# Patient Record
Sex: MALE | Race: WHITE | NOT HISPANIC OR LATINO | Employment: OTHER | URBAN - METROPOLITAN AREA
[De-identification: names, ages, dates, MRNs, and addresses within clinical notes are randomized per-mention and may not be internally consistent; named-entity substitution may affect disease eponyms.]

---

## 2023-04-28 ENCOUNTER — NURSING HOME VISIT (OUTPATIENT)
Dept: POST ACUTE CARE | Facility: EXTERNAL LOCATION | Age: 63
End: 2023-04-28
Payer: MEDICARE

## 2023-04-28 DIAGNOSIS — G35 MULTIPLE SCLEROSIS (MULTI): Primary | ICD-10-CM

## 2023-04-28 DIAGNOSIS — N31.9 NEUROGENIC BLADDER: ICD-10-CM

## 2023-04-28 PROCEDURE — 99308 SBSQ NF CARE LOW MDM 20: CPT | Performed by: FAMILY MEDICINE

## 2023-04-28 NOTE — LETTER
Patient: Raheem Hilton  : 1960    Encounter Date: 2023    A handwritten note was entered into the medical record at [facility] on this date.      Electronically Signed By: Luca Angeles MD   23 11:48 AM

## 2023-07-19 ENCOUNTER — NURSING HOME VISIT (OUTPATIENT)
Dept: POST ACUTE CARE | Facility: EXTERNAL LOCATION | Age: 63
End: 2023-07-19
Payer: MEDICARE

## 2023-07-19 DIAGNOSIS — G35 MULTIPLE SCLEROSIS (MULTI): Primary | ICD-10-CM

## 2023-07-19 DIAGNOSIS — N31.9 NEUROGENIC BLADDER: ICD-10-CM

## 2023-07-19 PROCEDURE — 99308 SBSQ NF CARE LOW MDM 20: CPT | Performed by: FAMILY MEDICINE

## 2023-07-19 NOTE — LETTER
Patient: Raheem Hilton  : 1960    Encounter Date: 2023    A handwritten note was entered into the medical record at [facility] on this date.      Electronically Signed By: Luca Angeles MD   23 11:49 AM

## 2023-09-26 ENCOUNTER — HOSPITAL ENCOUNTER (INPATIENT)
Dept: DATA CONVERSION | Facility: HOSPITAL | Age: 63
LOS: 6 days | Discharge: NURSING, REHAB OR LONG-TERM CARE | DRG: 698 | End: 2023-10-02
Attending: EMERGENCY MEDICINE | Admitting: INTERNAL MEDICINE
Payer: MEDICARE

## 2023-09-26 DIAGNOSIS — N39.0 URINARY TRACT INFECTION, SITE NOT SPECIFIED: ICD-10-CM

## 2023-09-26 DIAGNOSIS — J18.9 PNEUMONIA, UNSPECIFIED ORGANISM: ICD-10-CM

## 2023-09-26 LAB
ALANINE AMINOTRANSFERASE (SGPT) (U/L) IN SER/PLAS: 17 U/L (ref 10–52)
ALBUMIN (G/DL) IN SER/PLAS: 3.7 G/DL (ref 3.4–5)
ALKALINE PHOSPHATASE (U/L) IN SER/PLAS: 104 U/L (ref 33–136)
ANION GAP IN SER/PLAS: 12 MMOL/L (ref 10–20)
APPEARANCE, URINE: ABNORMAL
ASPARTATE AMINOTRANSFERASE (SGOT) (U/L) IN SER/PLAS: 22 U/L (ref 9–39)
BACTERIA, URINE: ABNORMAL /HPF
BASOPHILS (10*3/UL) IN BLOOD BY AUTOMATED COUNT: 0.07 X10E9/L (ref 0–0.1)
BASOPHILS/100 LEUKOCYTES IN BLOOD BY AUTOMATED COUNT: 0.5 % (ref 0–2)
BILIRUBIN TOTAL (MG/DL) IN SER/PLAS: 0.2 MG/DL (ref 0–1.2)
BILIRUBIN, URINE: NEGATIVE
BLOOD, URINE: ABNORMAL
CALCIUM (MG/DL) IN SER/PLAS: 9.1 MG/DL (ref 8.6–10.3)
CARBON DIOXIDE, TOTAL (MMOL/L) IN SER/PLAS: 25 MMOL/L (ref 21–32)
CHLORIDE (MMOL/L) IN SER/PLAS: 98 MMOL/L (ref 98–107)
COLOR, URINE: YELLOW
CREATININE (MG/DL) IN SER/PLAS: 0.51 MG/DL (ref 0.5–1.3)
EOSINOPHILS (10*3/UL) IN BLOOD BY AUTOMATED COUNT: 0.67 X10E9/L (ref 0–0.7)
EOSINOPHILS/100 LEUKOCYTES IN BLOOD BY AUTOMATED COUNT: 4.5 % (ref 0–6)
ERYTHROCYTE DISTRIBUTION WIDTH (RATIO) BY AUTOMATED COUNT: 14 % (ref 11.5–14.5)
ERYTHROCYTE MEAN CORPUSCULAR HEMOGLOBIN CONCENTRATION (G/DL) BY AUTOMATED: 32.3 G/DL (ref 32–36)
ERYTHROCYTE MEAN CORPUSCULAR VOLUME (FL) BY AUTOMATED COUNT: 90 FL (ref 80–100)
ERYTHROCYTES (10*6/UL) IN BLOOD BY AUTOMATED COUNT: 4.6 X10E12/L (ref 4.5–5.9)
GFR MALE: >90 ML/MIN/1.73M2
GLUCOSE (MG/DL) IN SER/PLAS: 127 MG/DL (ref 74–99)
GLUCOSE, URINE: NEGATIVE MG/DL
HEMATOCRIT (%) IN BLOOD BY AUTOMATED COUNT: 41.5 % (ref 41–52)
HEMOGLOBIN (G/DL) IN BLOOD: 13.4 G/DL (ref 13.5–17.5)
IMMATURE GRANULOCYTES/100 LEUKOCYTES IN BLOOD BY AUTOMATED COUNT: 0.3 % (ref 0–0.9)
KETONES, URINE: NEGATIVE MG/DL
LACTATE (MMOL/L) IN SER/PLAS: 1.1 MMOL/L (ref 0.4–2)
LACTATE (MMOL/L) IN SER/PLAS: 2.4 MMOL/L (ref 0.4–2)
LEUKOCYTE ESTERASE, URINE: ABNORMAL
LEUKOCYTES (10*3/UL) IN BLOOD BY AUTOMATED COUNT: 14.9 X10E9/L (ref 4.4–11.3)
LYMPHOCYTES (10*3/UL) IN BLOOD BY AUTOMATED COUNT: 1.77 X10E9/L (ref 1.2–4.8)
LYMPHOCYTES/100 LEUKOCYTES IN BLOOD BY AUTOMATED COUNT: 11.9 % (ref 13–44)
MAGNESIUM (MG/DL) IN SER/PLAS: 1.82 MG/DL (ref 1.6–2.4)
MONOCYTES (10*3/UL) IN BLOOD BY AUTOMATED COUNT: 1.68 X10E9/L (ref 0.1–1)
MONOCYTES/100 LEUKOCYTES IN BLOOD BY AUTOMATED COUNT: 11.3 % (ref 2–10)
MUCUS, URINE: ABNORMAL /LPF
NEUTROPHILS (10*3/UL) IN BLOOD BY AUTOMATED COUNT: 10.68 X10E9/L (ref 1.2–7.7)
NEUTROPHILS/100 LEUKOCYTES IN BLOOD BY AUTOMATED COUNT: 71.5 % (ref 40–80)
NITRITE, URINE: NEGATIVE
PH, URINE: 7 (ref 5–8)
PLATELETS (10*3/UL) IN BLOOD AUTOMATED COUNT: 464 X10E9/L (ref 150–450)
POTASSIUM (MMOL/L) IN SER/PLAS: 3.6 MMOL/L (ref 3.5–5.3)
PROTEIN TOTAL: 7 G/DL (ref 6.4–8.2)
PROTEIN, URINE: ABNORMAL MG/DL
RBC, URINE: 141 /HPF (ref 0–5)
SODIUM (MMOL/L) IN SER/PLAS: 131 MMOL/L (ref 136–145)
SPECIFIC GRAVITY, URINE: 1.01 (ref 1–1.03)
SQUAMOUS EPITHELIAL CELLS, URINE: <1 /HPF
THYROTROPIN (MIU/L) IN SER/PLAS BY DETECTION LIMIT <= 0.05 MIU/L: 0.01 MIU/L (ref 0.44–3.98)
UREA NITROGEN (MG/DL) IN SER/PLAS: 19 MG/DL (ref 6–23)
UROBILINOGEN, URINE: <2 MG/DL (ref 0–1.9)
WBC, URINE: 8 /HPF (ref 0–5)

## 2023-09-26 PROCEDURE — 96361 HYDRATE IV INFUSION ADD-ON: CPT

## 2023-09-26 PROCEDURE — 84439 ASSAY OF FREE THYROXINE: CPT

## 2023-09-26 PROCEDURE — 99291 CRITICAL CARE FIRST HOUR: CPT

## 2023-09-26 PROCEDURE — 83735 ASSAY OF MAGNESIUM: CPT

## 2023-09-26 PROCEDURE — 83605 ASSAY OF LACTIC ACID: CPT

## 2023-09-26 PROCEDURE — 93005 ELECTROCARDIOGRAM TRACING: CPT

## 2023-09-26 PROCEDURE — 87086 URINE CULTURE/COLONY COUNT: CPT

## 2023-09-26 PROCEDURE — 84550 ASSAY OF BLOOD/URIC ACID: CPT

## 2023-09-26 PROCEDURE — 85025 COMPLETE CBC W/AUTO DIFF WBC: CPT

## 2023-09-26 PROCEDURE — 9990 CHARGE CONVERSION

## 2023-09-26 PROCEDURE — 80053 COMPREHEN METABOLIC PANEL: CPT

## 2023-09-26 PROCEDURE — 84481 FREE ASSAY (FT-3): CPT | Mod: 90

## 2023-09-26 PROCEDURE — 84443 ASSAY THYROID STIM HORMONE: CPT

## 2023-09-26 PROCEDURE — 96365 THER/PROPH/DIAG IV INF INIT: CPT

## 2023-09-26 PROCEDURE — 71045 X-RAY EXAM CHEST 1 VIEW: CPT

## 2023-09-26 PROCEDURE — 81001 URINALYSIS AUTO W/SCOPE: CPT

## 2023-09-27 LAB
THYROXINE (T4) FREE (NG/DL) IN SER/PLAS: 1.35 NG/DL (ref 0.61–1.12)
TRIIODOTHYRONINE (T3) FREE (PG/ML) IN SER/PLAS: 4 PG/ML (ref 2.3–4.2)
URATE (MG/DL) IN SER/PLAS: 3.5 MG/DL (ref 4–7.5)

## 2023-09-27 PROCEDURE — 71045 X-RAY EXAM CHEST 1 VIEW: CPT

## 2023-09-27 PROCEDURE — 87086 URINE CULTURE/COLONY COUNT: CPT

## 2023-09-27 PROCEDURE — 85025 COMPLETE CBC W/AUTO DIFF WBC: CPT

## 2023-09-27 PROCEDURE — 83735 ASSAY OF MAGNESIUM: CPT

## 2023-09-27 PROCEDURE — 83605 ASSAY OF LACTIC ACID: CPT

## 2023-09-27 PROCEDURE — 9990 CHARGE CONVERSION

## 2023-09-27 PROCEDURE — 99291 CRITICAL CARE FIRST HOUR: CPT

## 2023-09-27 PROCEDURE — 81001 URINALYSIS AUTO W/SCOPE: CPT

## 2023-09-27 PROCEDURE — 93005 ELECTROCARDIOGRAM TRACING: CPT

## 2023-09-27 PROCEDURE — 96365 THER/PROPH/DIAG IV INF INIT: CPT

## 2023-09-27 PROCEDURE — 84443 ASSAY THYROID STIM HORMONE: CPT

## 2023-09-27 PROCEDURE — 80053 COMPREHEN METABOLIC PANEL: CPT

## 2023-09-27 PROCEDURE — 96361 HYDRATE IV INFUSION ADD-ON: CPT

## 2023-09-28 LAB
ANION GAP IN SER/PLAS: 9 MMOL/L (ref 10–20)
BASOPHILS (10*3/UL) IN BLOOD BY AUTOMATED COUNT: 0.08 X10E9/L (ref 0–0.1)
BASOPHILS/100 LEUKOCYTES IN BLOOD BY AUTOMATED COUNT: 1 % (ref 0–2)
CALCIUM (MG/DL) IN SER/PLAS: 8.4 MG/DL (ref 8.6–10.3)
CARBON DIOXIDE, TOTAL (MMOL/L) IN SER/PLAS: 24 MMOL/L (ref 21–32)
CHLORIDE (MMOL/L) IN SER/PLAS: 108 MMOL/L (ref 98–107)
CREATININE (MG/DL) IN SER/PLAS: 0.38 MG/DL (ref 0.5–1.3)
EOSINOPHILS (10*3/UL) IN BLOOD BY AUTOMATED COUNT: 0.8 X10E9/L (ref 0–0.7)
EOSINOPHILS/100 LEUKOCYTES IN BLOOD BY AUTOMATED COUNT: 10.4 % (ref 0–6)
ERYTHROCYTE DISTRIBUTION WIDTH (RATIO) BY AUTOMATED COUNT: 14.1 % (ref 11.5–14.5)
ERYTHROCYTE MEAN CORPUSCULAR HEMOGLOBIN CONCENTRATION (G/DL) BY AUTOMATED: 31.8 G/DL (ref 32–36)
ERYTHROCYTE MEAN CORPUSCULAR VOLUME (FL) BY AUTOMATED COUNT: 92 FL (ref 80–100)
ERYTHROCYTES (10*6/UL) IN BLOOD BY AUTOMATED COUNT: 3.98 X10E12/L (ref 4.5–5.9)
GFR MALE: >90 ML/MIN/1.73M2
GLUCOSE (MG/DL) IN SER/PLAS: 90 MG/DL (ref 74–99)
HEMATOCRIT (%) IN BLOOD BY AUTOMATED COUNT: 36.5 % (ref 41–52)
HEMOGLOBIN (G/DL) IN BLOOD: 11.6 G/DL (ref 13.5–17.5)
IMMATURE GRANULOCYTES/100 LEUKOCYTES IN BLOOD BY AUTOMATED COUNT: 0.3 % (ref 0–0.9)
LEUKOCYTES (10*3/UL) IN BLOOD BY AUTOMATED COUNT: 7.7 X10E9/L (ref 4.4–11.3)
LYMPHOCYTES (10*3/UL) IN BLOOD BY AUTOMATED COUNT: 1.68 X10E9/L (ref 1.2–4.8)
LYMPHOCYTES/100 LEUKOCYTES IN BLOOD BY AUTOMATED COUNT: 21.9 % (ref 13–44)
MAGNESIUM (MG/DL) IN SER/PLAS: 1.96 MG/DL (ref 1.6–2.4)
MONOCYTES (10*3/UL) IN BLOOD BY AUTOMATED COUNT: 1.25 X10E9/L (ref 0.1–1)
MONOCYTES/100 LEUKOCYTES IN BLOOD BY AUTOMATED COUNT: 16.3 % (ref 2–10)
NEUTROPHILS (10*3/UL) IN BLOOD BY AUTOMATED COUNT: 3.83 X10E9/L (ref 1.2–7.7)
NEUTROPHILS/100 LEUKOCYTES IN BLOOD BY AUTOMATED COUNT: 50.1 % (ref 40–80)
PHOSPHATE (MG/DL) IN SER/PLAS: 2.6 MG/DL (ref 2.5–4.9)
PLATELETS (10*3/UL) IN BLOOD AUTOMATED COUNT: 432 X10E9/L (ref 150–450)
POTASSIUM (MMOL/L) IN SER/PLAS: 3.7 MMOL/L (ref 3.5–5.3)
SODIUM (MMOL/L) IN SER/PLAS: 137 MMOL/L (ref 136–145)
UREA NITROGEN (MG/DL) IN SER/PLAS: 10 MG/DL (ref 6–23)
URINE CULTURE: NO GROWTH

## 2023-09-28 PROCEDURE — 84100 ASSAY OF PHOSPHORUS: CPT

## 2023-09-28 PROCEDURE — 84481 FREE ASSAY (FT-3): CPT | Mod: 90

## 2023-09-28 PROCEDURE — 84439 ASSAY OF FREE THYROXINE: CPT

## 2023-09-28 PROCEDURE — 80048 BASIC METABOLIC PNL TOTAL CA: CPT

## 2023-09-28 PROCEDURE — 85025 COMPLETE CBC W/AUTO DIFF WBC: CPT

## 2023-09-28 PROCEDURE — 9990 CHARGE CONVERSION

## 2023-09-28 PROCEDURE — 83735 ASSAY OF MAGNESIUM: CPT

## 2023-09-28 PROCEDURE — 84550 ASSAY OF BLOOD/URIC ACID: CPT

## 2023-09-28 RX ORDER — ONDANSETRON HYDROCHLORIDE 2 MG/ML
4 INJECTION, SOLUTION INTRAVENOUS EVERY 6 HOURS PRN
Status: DISCONTINUED | OUTPATIENT
Start: 2023-09-30 | End: 2023-09-30 | Stop reason: ENTERED-IN-ERROR

## 2023-09-28 RX ORDER — POLYETHYLENE GLYCOL 3350 17 G/17G
17 POWDER, FOR SOLUTION ORAL DAILY
Status: DISCONTINUED | OUTPATIENT
Start: 2023-09-30 | End: 2023-10-02 | Stop reason: HOSPADM

## 2023-09-28 RX ORDER — CARBAMAZEPINE 200 MG/1
200 TABLET ORAL 2 TIMES DAILY
Status: DISCONTINUED | OUTPATIENT
Start: 2023-09-30 | End: 2023-10-02 | Stop reason: HOSPADM

## 2023-09-28 RX ORDER — SODIUM CHLORIDE 9 MG/ML
75 INJECTION, SOLUTION INTRAVENOUS CONTINUOUS
Status: DISCONTINUED | OUTPATIENT
Start: 2023-09-30 | End: 2023-10-02 | Stop reason: HOSPADM

## 2023-09-28 RX ORDER — METOPROLOL TARTRATE 25 MG/1
25 TABLET, FILM COATED ORAL DAILY
Status: DISCONTINUED | OUTPATIENT
Start: 2023-09-30 | End: 2023-09-30 | Stop reason: ENTERED-IN-ERROR

## 2023-09-28 RX ORDER — GABAPENTIN 300 MG/1
600 CAPSULE ORAL 3 TIMES DAILY
Status: DISCONTINUED | OUTPATIENT
Start: 2023-09-30 | End: 2023-10-02 | Stop reason: HOSPADM

## 2023-09-28 RX ORDER — ATORVASTATIN CALCIUM 80 MG/1
80 TABLET, FILM COATED ORAL NIGHTLY
Status: DISCONTINUED | OUTPATIENT
Start: 2023-09-30 | End: 2023-10-02 | Stop reason: HOSPADM

## 2023-09-28 RX ORDER — MAGNESIUM HYDROXIDE 2400 MG/10ML
10 SUSPENSION ORAL DAILY PRN
Status: DISCONTINUED | OUTPATIENT
Start: 2023-09-30 | End: 2023-10-02 | Stop reason: HOSPADM

## 2023-09-28 RX ORDER — BACLOFEN 10 MG/1
10 TABLET ORAL 2 TIMES DAILY
Status: DISCONTINUED | OUTPATIENT
Start: 2023-09-30 | End: 2023-10-02 | Stop reason: HOSPADM

## 2023-09-28 RX ORDER — ACETAMINOPHEN 325 MG/1
650 TABLET ORAL EVERY 4 HOURS PRN
Status: DISCONTINUED | OUTPATIENT
Start: 2023-09-30 | End: 2023-10-02 | Stop reason: HOSPADM

## 2023-09-28 RX ORDER — MEROPENEM 1 G/1
1 INJECTION, POWDER, FOR SOLUTION INTRAVENOUS EVERY 8 HOURS
Status: DISCONTINUED | OUTPATIENT
Start: 2023-09-30 | End: 2023-09-29

## 2023-09-29 LAB
ANION GAP IN SER/PLAS: 9 MMOL/L (ref 10–20)
CALCIUM (MG/DL) IN SER/PLAS: 8.2 MG/DL (ref 8.6–10.3)
CARBON DIOXIDE, TOTAL (MMOL/L) IN SER/PLAS: 24 MMOL/L (ref 21–32)
CHLORIDE (MMOL/L) IN SER/PLAS: 104 MMOL/L (ref 98–107)
CREATININE (MG/DL) IN SER/PLAS: 0.37 MG/DL (ref 0.5–1.3)
ERYTHROCYTE DISTRIBUTION WIDTH (RATIO) BY AUTOMATED COUNT: 13.7 % (ref 11.5–14.5)
ERYTHROCYTE MEAN CORPUSCULAR HEMOGLOBIN CONCENTRATION (G/DL) BY AUTOMATED: 31.9 G/DL (ref 32–36)
ERYTHROCYTE MEAN CORPUSCULAR VOLUME (FL) BY AUTOMATED COUNT: 91 FL (ref 80–100)
ERYTHROCYTES (10*6/UL) IN BLOOD BY AUTOMATED COUNT: 4 X10E12/L (ref 4.5–5.9)
GFR MALE: >90 ML/MIN/1.73M2
GLUCOSE (MG/DL) IN SER/PLAS: 88 MG/DL (ref 74–99)
HEMATOCRIT (%) IN BLOOD BY AUTOMATED COUNT: 36.4 % (ref 41–52)
HEMOGLOBIN (G/DL) IN BLOOD: 11.6 G/DL (ref 13.5–17.5)
LEUKOCYTES (10*3/UL) IN BLOOD BY AUTOMATED COUNT: 8.3 X10E9/L (ref 4.4–11.3)
PLATELETS (10*3/UL) IN BLOOD AUTOMATED COUNT: 410 X10E9/L (ref 150–450)
POTASSIUM (MMOL/L) IN SER/PLAS: 3.6 MMOL/L (ref 3.5–5.3)
SODIUM (MMOL/L) IN SER/PLAS: 133 MMOL/L (ref 136–145)
UREA NITROGEN (MG/DL) IN SER/PLAS: 8 MG/DL (ref 6–23)

## 2023-09-29 PROCEDURE — 85025 COMPLETE CBC W/AUTO DIFF WBC: CPT

## 2023-09-29 PROCEDURE — 9990 CHARGE CONVERSION

## 2023-09-29 PROCEDURE — 85027 COMPLETE CBC AUTOMATED: CPT

## 2023-09-29 PROCEDURE — 84100 ASSAY OF PHOSPHORUS: CPT

## 2023-09-29 PROCEDURE — 74177 CT ABD & PELVIS W/CONTRAST: CPT

## 2023-09-29 PROCEDURE — 80048 BASIC METABOLIC PNL TOTAL CA: CPT

## 2023-09-29 PROCEDURE — 83735 ASSAY OF MAGNESIUM: CPT

## 2023-09-29 RX ORDER — MORPHINE SULFATE 2 MG/ML
2 INJECTION, SOLUTION INTRAMUSCULAR; INTRAVENOUS EVERY 4 HOURS PRN
Status: DISCONTINUED | OUTPATIENT
Start: 2023-09-30 | End: 2023-10-02 | Stop reason: HOSPADM

## 2023-09-29 RX ORDER — METHIMAZOLE 5 MG/1
5 TABLET ORAL EVERY 8 HOURS SCHEDULED
Status: DISCONTINUED | OUTPATIENT
Start: 2023-09-30 | End: 2023-10-02 | Stop reason: HOSPADM

## 2023-09-29 RX ORDER — MEROPENEM 1 G/1
1 INJECTION, POWDER, FOR SOLUTION INTRAVENOUS EVERY 8 HOURS
Status: DISCONTINUED | OUTPATIENT
Start: 2023-09-30 | End: 2023-10-01

## 2023-09-30 LAB
ANION GAP SERPL CALC-SCNC: 8 MMOL/L (ref 10–20)
BUN SERPL-MCNC: 8 MG/DL (ref 6–23)
CALCIUM SERPL-MCNC: 8.1 MG/DL (ref 8.6–10.3)
CHLORIDE SERPL-SCNC: 104 MMOL/L (ref 98–107)
CO2 SERPL-SCNC: 24 MMOL/L (ref 21–32)
CREAT SERPL-MCNC: 0.41 MG/DL (ref 0.5–1.3)
ERYTHROCYTE [DISTWIDTH] IN BLOOD BY AUTOMATED COUNT: 13.9 % (ref 11.5–14.5)
GFR SERPL CREATININE-BSD FRML MDRD: >90 ML/MIN/1.73M*2
GLUCOSE SERPL-MCNC: 85 MG/DL (ref 74–99)
HCT VFR BLD AUTO: 36.6 % (ref 41–52)
HGB BLD-MCNC: 11.8 G/DL (ref 13.5–17.5)
MCH RBC QN AUTO: 29.5 PG (ref 26–34)
MCHC RBC AUTO-ENTMCNC: 32.2 G/DL (ref 32–36)
MCV RBC AUTO: 92 FL (ref 80–100)
NRBC BLD-RTO: 0 /100 WBCS (ref 0–0)
PLATELET # BLD AUTO: 372 X10*3/UL (ref 150–450)
PMV BLD AUTO: 8.8 FL (ref 7.5–11.5)
POTASSIUM SERPL-SCNC: 3.5 MMOL/L (ref 3.5–5.3)
RBC # BLD AUTO: 4 X10*6/UL (ref 4.5–5.9)
SODIUM SERPL-SCNC: 132 MMOL/L (ref 136–145)
WBC # BLD AUTO: 7.7 X10*3/UL (ref 4.4–11.3)

## 2023-09-30 PROCEDURE — 99253 IP/OBS CNSLTJ NEW/EST LOW 45: CPT | Performed by: SURGERY

## 2023-09-30 PROCEDURE — 36415 COLL VENOUS BLD VENIPUNCTURE: CPT | Performed by: INTERNAL MEDICINE

## 2023-09-30 PROCEDURE — 85027 COMPLETE CBC AUTOMATED: CPT | Performed by: INTERNAL MEDICINE

## 2023-09-30 PROCEDURE — 99232 SBSQ HOSP IP/OBS MODERATE 35: CPT | Performed by: HOSPITALIST

## 2023-09-30 PROCEDURE — 80048 BASIC METABOLIC PNL TOTAL CA: CPT

## 2023-09-30 PROCEDURE — 2500000001 HC RX 250 WO HCPCS SELF ADMINISTERED DRUGS (ALT 637 FOR MEDICARE OP): Performed by: INTERNAL MEDICINE

## 2023-09-30 PROCEDURE — 85027 COMPLETE CBC AUTOMATED: CPT

## 2023-09-30 PROCEDURE — 2500000004 HC RX 250 GENERAL PHARMACY W/ HCPCS (ALT 636 FOR OP/ED): Performed by: INTERNAL MEDICINE

## 2023-09-30 PROCEDURE — 94640 AIRWAY INHALATION TREATMENT: CPT

## 2023-09-30 PROCEDURE — 1100000001 HC PRIVATE ROOM DAILY

## 2023-09-30 PROCEDURE — 9990 CHARGE CONVERSION

## 2023-09-30 PROCEDURE — 2500000002 HC RX 250 W HCPCS SELF ADMINISTERED DRUGS (ALT 637 FOR MEDICARE OP, ALT 636 FOR OP/ED): Performed by: INTERNAL MEDICINE

## 2023-09-30 PROCEDURE — 2020000001 HC ICU ROOM DAILY

## 2023-09-30 PROCEDURE — 80048 BASIC METABOLIC PNL TOTAL CA: CPT | Performed by: INTERNAL MEDICINE

## 2023-09-30 RX ORDER — METOPROLOL SUCCINATE 25 MG/1
25 TABLET, EXTENDED RELEASE ORAL DAILY
Status: DISCONTINUED | OUTPATIENT
Start: 2023-09-30 | End: 2023-10-02 | Stop reason: HOSPADM

## 2023-09-30 RX ORDER — METHENAMINE HIPPURATE 1000 MG/1
1 TABLET ORAL 2 TIMES DAILY
Status: DISCONTINUED | OUTPATIENT
Start: 2023-09-30 | End: 2023-09-30

## 2023-09-30 RX ORDER — ONDANSETRON HYDROCHLORIDE 2 MG/ML
4 INJECTION, SOLUTION INTRAVENOUS EVERY 4 HOURS PRN
Status: DISCONTINUED | OUTPATIENT
Start: 2023-09-30 | End: 2023-10-02 | Stop reason: HOSPADM

## 2023-09-30 RX ORDER — ALBUTEROL SULFATE 0.83 MG/ML
2.5 SOLUTION RESPIRATORY (INHALATION) EVERY 6 HOURS PRN
Status: DISCONTINUED | OUTPATIENT
Start: 2023-09-30 | End: 2023-10-02 | Stop reason: HOSPADM

## 2023-09-30 RX ADMIN — Medication 1 G: at 10:00

## 2023-09-30 RX ADMIN — Medication 1 G: at 02:00

## 2023-09-30 RX ADMIN — BACLOFEN 10 MG: 10 TABLET ORAL at 09:00

## 2023-09-30 RX ADMIN — GABAPENTIN 600 MG: 300 CAPSULE ORAL at 15:00

## 2023-09-30 RX ADMIN — METOPROLOL SUCCINATE 25 MG: 25 TABLET, EXTENDED RELEASE ORAL at 09:00

## 2023-09-30 RX ADMIN — Medication 1 CAPSULE: at 15:00

## 2023-09-30 RX ADMIN — ALBUTEROL SULFATE 2.5 MG: 2.5 SOLUTION RESPIRATORY (INHALATION) at 20:36

## 2023-09-30 RX ADMIN — Medication 1 CAPSULE: at 09:00

## 2023-09-30 RX ADMIN — METHIMAZOLE 5 MG: 5 TABLET ORAL at 02:00

## 2023-09-30 RX ADMIN — Medication 1 G: at 18:00

## 2023-09-30 RX ADMIN — GABAPENTIN 600 MG: 300 CAPSULE ORAL at 09:00

## 2023-09-30 RX ADMIN — ATORVASTATIN CALCIUM 80 MG: 80 TABLET, FILM COATED ORAL at 21:12

## 2023-09-30 RX ADMIN — Medication 1 CAPSULE: at 21:12

## 2023-09-30 RX ADMIN — CARBAMAZEPINE 200 MG: 200 TABLET ORAL at 09:00

## 2023-09-30 RX ADMIN — CARBAMAZEPINE 200 MG: 200 TABLET ORAL at 21:12

## 2023-09-30 RX ADMIN — METHIMAZOLE 5 MG: 5 TABLET ORAL at 10:00

## 2023-09-30 RX ADMIN — POLYETHYLENE GLYCOL 3350 17 G: 17 POWDER, FOR SOLUTION ORAL at 09:00

## 2023-09-30 RX ADMIN — SODIUM CHLORIDE 75 ML/HR: 9 INJECTION, SOLUTION INTRAVENOUS at 00:00

## 2023-09-30 RX ADMIN — SODIUM CHLORIDE 75 ML/HR: 9 INJECTION, SOLUTION INTRAVENOUS at 12:30

## 2023-09-30 RX ADMIN — GABAPENTIN 600 MG: 300 CAPSULE ORAL at 21:12

## 2023-09-30 RX ADMIN — METHIMAZOLE 5 MG: 5 TABLET ORAL at 21:12

## 2023-09-30 RX ADMIN — METHIMAZOLE 5 MG: 5 TABLET ORAL at 14:00

## 2023-09-30 RX ADMIN — BACLOFEN 10 MG: 10 TABLET ORAL at 21:12

## 2023-09-30 SDOH — ECONOMIC STABILITY: INCOME INSECURITY: HOW HARD IS IT FOR YOU TO PAY FOR THE VERY BASICS LIKE FOOD, HOUSING, MEDICAL CARE, AND HEATING?: NOT HARD AT ALL

## 2023-09-30 SDOH — ECONOMIC STABILITY: HOUSING INSECURITY
IN THE LAST 12 MONTHS, WAS THERE A TIME WHEN YOU DID NOT HAVE A STEADY PLACE TO SLEEP OR SLEPT IN A SHELTER (INCLUDING NOW)?: NO

## 2023-09-30 SDOH — HEALTH STABILITY: MENTAL HEALTH: HOW OFTEN DO YOU HAVE A DRINK CONTAINING ALCOHOL?: NEVER

## 2023-09-30 SDOH — HEALTH STABILITY: PHYSICAL HEALTH: ON AVERAGE, HOW MANY DAYS PER WEEK DO YOU ENGAGE IN MODERATE TO STRENUOUS EXERCISE (LIKE A BRISK WALK)?: 0 DAYS

## 2023-09-30 SDOH — SOCIAL STABILITY: SOCIAL INSECURITY: DO YOU FEEL ANYONE HAS EXPLOITED OR TAKEN ADVANTAGE OF YOU FINANCIALLY OR OF YOUR PERSONAL PROPERTY?: NO

## 2023-09-30 SDOH — HEALTH STABILITY: MENTAL HEALTH: HOW MANY STANDARD DRINKS CONTAINING ALCOHOL DO YOU HAVE ON A TYPICAL DAY?: PATIENT DOES NOT DRINK

## 2023-09-30 SDOH — ECONOMIC STABILITY: HOUSING INSECURITY: IN THE LAST 12 MONTHS, HOW MANY PLACES HAVE YOU LIVED?: 1

## 2023-09-30 SDOH — ECONOMIC STABILITY: INCOME INSECURITY: IN THE PAST 12 MONTHS, HAS THE ELECTRIC, GAS, OIL, OR WATER COMPANY THREATENED TO SHUT OFF SERVICE IN YOUR HOME?: NO

## 2023-09-30 SDOH — HEALTH STABILITY: MENTAL HEALTH: HOW OFTEN DO YOU HAVE 6 OR MORE DRINKS ON ONE OCCASION?: NEVER

## 2023-09-30 SDOH — SOCIAL STABILITY: SOCIAL INSECURITY: WERE YOU ABLE TO COMPLETE ALL THE BEHAVIORAL HEALTH SCREENINGS?: YES

## 2023-09-30 SDOH — ECONOMIC STABILITY: TRANSPORTATION INSECURITY
IN THE PAST 12 MONTHS, HAS THE LACK OF TRANSPORTATION KEPT YOU FROM MEDICAL APPOINTMENTS OR FROM GETTING MEDICATIONS?: NO

## 2023-09-30 SDOH — SOCIAL STABILITY: SOCIAL INSECURITY: DO YOU FEEL UNSAFE GOING BACK TO THE PLACE WHERE YOU ARE LIVING?: NO

## 2023-09-30 SDOH — SOCIAL STABILITY: SOCIAL INSECURITY: HAS ANYONE EVER THREATENED TO HURT YOUR FAMILY OR YOUR PETS?: NO

## 2023-09-30 SDOH — ECONOMIC STABILITY: INCOME INSECURITY: IN THE LAST 12 MONTHS, WAS THERE A TIME WHEN YOU WERE NOT ABLE TO PAY THE MORTGAGE OR RENT ON TIME?: NO

## 2023-09-30 SDOH — SOCIAL STABILITY: SOCIAL INSECURITY: DOES ANYONE TRY TO KEEP YOU FROM HAVING/CONTACTING OTHER FRIENDS OR DOING THINGS OUTSIDE YOUR HOME?: NO

## 2023-09-30 SDOH — ECONOMIC STABILITY: TRANSPORTATION INSECURITY
IN THE PAST 12 MONTHS, HAS LACK OF TRANSPORTATION KEPT YOU FROM MEETINGS, WORK, OR FROM GETTING THINGS NEEDED FOR DAILY LIVING?: NO

## 2023-09-30 SDOH — SOCIAL STABILITY: SOCIAL INSECURITY: ABUSE: ADULT

## 2023-09-30 SDOH — HEALTH STABILITY: PHYSICAL HEALTH: ON AVERAGE, HOW MANY MINUTES DO YOU ENGAGE IN EXERCISE AT THIS LEVEL?: 0 MIN

## 2023-09-30 SDOH — SOCIAL STABILITY: SOCIAL INSECURITY: ARE YOU OR HAVE YOU BEEN THREATENED OR ABUSED PHYSICALLY, EMOTIONALLY, OR SEXUALLY BY ANYONE?: NO

## 2023-09-30 SDOH — SOCIAL STABILITY: SOCIAL INSECURITY: HAVE YOU HAD THOUGHTS OF HARMING ANYONE ELSE?: NO

## 2023-09-30 SDOH — SOCIAL STABILITY: SOCIAL INSECURITY: ARE THERE ANY APPARENT SIGNS OF INJURIES/BEHAVIORS THAT COULD BE RELATED TO ABUSE/NEGLECT?: NO

## 2023-09-30 ASSESSMENT — ACTIVITIES OF DAILY LIVING (ADL)
GROOMING: NEEDS ASSISTANCE
PATIENT'S MEMORY ADEQUATE TO SAFELY COMPLETE DAILY ACTIVITIES?: YES
TOILETING: DEPENDENT
HEARING - RIGHT EAR: FUNCTIONAL
FEEDING YOURSELF: NEEDS ASSISTANCE
ADEQUATE_TO_COMPLETE_ADL: YES
WALKS IN HOME: DEPENDENT
HEARING - RIGHT EAR: FUNCTIONAL
JUDGMENT_ADEQUATE_SAFELY_COMPLETE_DAILY_ACTIVITIES: YES
FEEDING YOURSELF: NEEDS ASSISTANCE
DRESSING YOURSELF: DEPENDENT
TOILETING: DEPENDENT
ASSISTIVE_DEVICE: EYEGLASSES;WHEELCHAIR
HEARING - LEFT EAR: FUNCTIONAL
HEARING - LEFT EAR: FUNCTIONAL
GROOMING: NEEDS ASSISTANCE
DRESSING YOURSELF: DEPENDENT
HEARING - RIGHT EAR: FUNCTIONAL
DRESSING YOURSELF: DEPENDENT
HEARING - LEFT EAR: FUNCTIONAL
GROOMING: NEEDS ASSISTANCE
WALKS IN HOME: DEPENDENT
DRESSING YOURSELF: DEPENDENT
BATHING: DEPENDENT
FEEDING YOURSELF: NEEDS ASSISTANCE
GROOMING: NEEDS ASSISTANCE
JUDGMENT_ADEQUATE_SAFELY_COMPLETE_DAILY_ACTIVITIES: YES
PATIENT'S MEMORY ADEQUATE TO SAFELY COMPLETE DAILY ACTIVITIES?: YES
WALKS IN HOME: DEPENDENT
PATIENT'S MEMORY ADEQUATE TO SAFELY COMPLETE DAILY ACTIVITIES?: YES
HEARING - RIGHT EAR: FUNCTIONAL
BATHING: DEPENDENT
HEARING - LEFT EAR: FUNCTIONAL
WALKS IN HOME: DEPENDENT
JUDGMENT_ADEQUATE_SAFELY_COMPLETE_DAILY_ACTIVITIES: YES
JUDGMENT_ADEQUATE_SAFELY_COMPLETE_DAILY_ACTIVITIES: YES
TOILETING: DEPENDENT
BATHING: DEPENDENT
ADEQUATE_TO_COMPLETE_ADL: NO
ADEQUATE_TO_COMPLETE_ADL: NO
TOILETING: DEPENDENT
BATHING: DEPENDENT
ADEQUATE_TO_COMPLETE_ADL: YES
PATIENT'S MEMORY ADEQUATE TO SAFELY COMPLETE DAILY ACTIVITIES?: YES
FEEDING YOURSELF: NEEDS ASSISTANCE

## 2023-09-30 ASSESSMENT — PAIN - FUNCTIONAL ASSESSMENT
PAIN_FUNCTIONAL_ASSESSMENT: 0-10
PAIN_FUNCTIONAL_ASSESSMENT: 0-10

## 2023-09-30 ASSESSMENT — COGNITIVE AND FUNCTIONAL STATUS - GENERAL
STANDING UP FROM CHAIR USING ARMS: TOTAL
HELP NEEDED FOR BATHING: TOTAL
DAILY ACTIVITIY SCORE: 10
PERSONAL GROOMING: A LITTLE
DRESSING REGULAR UPPER BODY CLOTHING: TOTAL
TURNING FROM BACK TO SIDE WHILE IN FLAT BAD: TOTAL
CLIMB 3 TO 5 STEPS WITH RAILING: TOTAL
TURNING FROM BACK TO SIDE WHILE IN FLAT BAD: TOTAL
DRESSING REGULAR LOWER BODY CLOTHING: TOTAL
CLIMB 3 TO 5 STEPS WITH RAILING: TOTAL
MOVING TO AND FROM BED TO CHAIR: TOTAL
MOBILITY SCORE: 6
EATING MEALS: TOTAL
MOVING FROM LYING ON BACK TO SITTING ON SIDE OF FLAT BED WITH BEDRAILS: TOTAL
WALKING IN HOSPITAL ROOM: TOTAL
TOILETING: TOTAL
MOVING TO AND FROM BED TO CHAIR: TOTAL
TOILETING: TOTAL
CLIMB 3 TO 5 STEPS WITH RAILING: TOTAL
TOILETING: TOTAL
DRESSING REGULAR UPPER BODY CLOTHING: TOTAL
DAILY ACTIVITIY SCORE: 6
EATING MEALS: A LITTLE
MOVING TO AND FROM BED TO CHAIR: TOTAL
DRESSING REGULAR LOWER BODY CLOTHING: TOTAL
DRESSING REGULAR LOWER BODY CLOTHING: TOTAL
DRESSING REGULAR UPPER BODY CLOTHING: TOTAL
STANDING UP FROM CHAIR USING ARMS: TOTAL
WALKING IN HOSPITAL ROOM: TOTAL
TURNING FROM BACK TO SIDE WHILE IN FLAT BAD: TOTAL
PERSONAL GROOMING: A LITTLE
STANDING UP FROM CHAIR USING ARMS: TOTAL
EATING MEALS: A LITTLE
PERSONAL GROOMING: TOTAL
MOVING FROM LYING ON BACK TO SITTING ON SIDE OF FLAT BED WITH BEDRAILS: TOTAL
DAILY ACTIVITIY SCORE: 10
WALKING IN HOSPITAL ROOM: TOTAL
MOVING FROM LYING ON BACK TO SITTING ON SIDE OF FLAT BED WITH BEDRAILS: TOTAL
MOBILITY SCORE: 6
HELP NEEDED FOR BATHING: TOTAL
HELP NEEDED FOR BATHING: TOTAL
MOBILITY SCORE: 6

## 2023-09-30 ASSESSMENT — LIFESTYLE VARIABLES
AUDIT-C TOTAL SCORE: 0
SKIP TO QUESTIONS 9-10: 1
AUDIT-C TOTAL SCORE: 0
SKIP TO QUESTIONS 9-10: 1

## 2023-09-30 ASSESSMENT — PATIENT HEALTH QUESTIONNAIRE - PHQ9
SUM OF ALL RESPONSES TO PHQ9 QUESTIONS 1 & 2: 0
1. LITTLE INTEREST OR PLEASURE IN DOING THINGS: NOT AT ALL
2. FEELING DOWN, DEPRESSED OR HOPELESS: NOT AT ALL

## 2023-09-30 ASSESSMENT — PAIN SCALES - GENERAL
PAINLEVEL_OUTOF10: 4
PAINLEVEL_OUTOF10: 2

## 2023-09-30 NOTE — H&P
History of Present Illness:   HPI:    ARNOLD VO is a 63 year old Male Who was sent from the nursing home to the emergency room for progressively worsening generalized weakness.  On presentation,  blood pressure 149/91, heart rate 103, respiratory rate 17, afebrile, saturation oxygen 95% on room air.  Pertinent findings on blood work-up; WBC 14,900, sodium 131, lactic acid 2.4 and TSH 0.01.  Urinalysis came back positive for leukocyte esterase  and bacteria.  Chest x-ray did not show any acute findings.  Patient was given in the emergency room 1 g IV ceftriaxone, methimazole 10 mg oral and IV fluids and then admitted to the medical service for further investigation and management.    Ports feelingUpon encounter, patient resting comfortably in his bed.  And lethargic.  Denies having any fever or chills.  No nausea or vomiting.  No abdominal discomfort.  No palpitations.  No chest pressure.  No restlessness.  No irritability    Review of Systems: 10 systems were reviewed and were negative except for those noted in the history of present illness.    Past medical history:    advanced multiple sclerosis complicated by lower extremities paralysis/ upper extremities paresis/stage IV decubitus coccygeal ulcer and stage II/III ulcerations at bilateral buttocks, neurogenic bladder status post chronic suprapubic  Livingston catheter complicated by recurrent urinary tract infections (ESBL, Proteus mirabilis and a multidrug-resistant procidentia), COPD, s/p splenectomy, pulmonary embolisms, diastolic CHF, hypertension, depression, depression, chronic anemia, ischemic  CVA (June 27, 2020) with a residual expressive aphasia, small bowel obstruction status post resection, hyponatremia , seizure disorder, and frequent hospitalizations for his wounds infections and/or urinary tract infections,    Past surgical history-small bowel resection, colectomy, splenectomy, vasectomy     Social history-former smoker, no alcohol abuse.  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  He does have advanced directives.     Family history-father had Alzheimer's disease    Comorbidities:   Pressure Injury:  ·  Pressure Injury Present on Admission Agree with nursing assessment and diagnoses as stated: Pressure Injury,  right posterior gluteal            Allergies:  ·  doxycycline : Unknown  ·  Bactrim : Unknown  ·  heparin : Unknown    Medications Prior to Admission:     carBAMazepine 200 mg oral tablet: 1 tab(s) orally 2 times a day  albuterol 2.5 mg/3 mL (0.083%) inhalation solution: 3 milliliter(s) inhaled every 4 hours, As Needed  fexofenadine 180 mg oral tablet: 1 tab(s) orally once a day  ascorbic acid 500 mg oral tablet: 1 tab(s) orally once a day  atorvastatin 80 mg oral tablet: 1 tab(s) orally once a day  baclofen 10 mg oral tablet: 1 tab(s) orally 2 times a day  biotin 10 mg oral tablet: 1 tab(s) orally once a day  cholecalciferol 125 mcg (5000 intl units) oral tablet: 1 tab(s) orally once a day  ferrous sulfate 325 mg (65 mg elemental iron) oral tablet: 1 tab(s) orally once a day  fluticasone 100 mcg/inh inhalation powder: 2 puff(s) inhaled 2 times a day  furosemide 40 mg oral tablet: 1 tab(s) orally once a day  gabapentin 600 mg oral tablet: 1 tab(s) orally 3 times a day  Jagdeep (Nutritional Supplement): 1 packet(s) orally 2 times a day  metoprolol succinate 25 mg oral capsule, extended release: 1 cap(s) orally once a day (at bedtime)  ondansetron 4 mg oral tablet: 1 tab(s) orally every 6 hours, As Needed  polyethylene glycol 3350 oral powder for reconstitution: 17 gram(s) orally once a day (at bedtime)  potassium chloride 20 mEq oral powder for reconstitution: 1 packet(s) orally 2 times a day  spironolactone 25 mg oral tablet: 1 tab(s) orally once a day  tiZANidine 2 mg oral tablet: 2 tab(s) orally once a day (at bedtime)  Macrobid 100 mg oral capsule: 1 cap(s) orally once a day every Tuesday, Friday morning for UTI prevention for 3  months  methenamine hippurate 1 g oral tablet: 1 tab(s) orally 2 times a day  hydrocodone-acetaminophen 5 mg-325 mg oral tablet: 1 tab(s) orally every 12 hours, As Needed  Zosyn 3 g-0.375 g/50 mL intravenous solution: 3.375 gram(s) intravenously every 6 hours given via midline for total of 10 days and last day will be 9/22/2023.    Objective:     Objective Information:      T   P  R  BP   MAP  SpO2   Value  37.1  71  18  106/65      94%  Date/Time 9/26 21:51 9/26 23:04 9/26 23:04 9/26 23:04    9/26 23:04  Range  (36.5C - 37.1C )  (70 - 103 )  (16 - 18 )  (96 - 149 )/ (60 - 91 )    (94% - 98% )  Highest temp of 37.1 C was recorded at 9/26 21:51      Pain reported at 9/26 19:42: 0 = None      ·  Physical Exam:    Constitutional:  awake and alert and oriented  to person and place and time; no apparent distress respiratory distress progress mildly ill-appearing   Eyes: PERRL, EOMI, clear sclera   ENMT: mucous membranes moist, oropharynx  clear   Head/Neck: Normocephalic; neck supple, no  apparent injury, thyroid without mass or tenderness, No JVD, trachea midline, no bruits   Respiratory/Thorax: There is some faint coarse  upper airway breath sounds; peripheral lung fields are clear otherwise   Cardiovascular: regular rate; regular rhythm;  normal S1-S2 with no murmur; trace edema and 1+ pulses bilaterally   Gastrointestinal: Soft, nontender, nondistended,  positive bowel sounds.  Suprapubic Livingston catheter.   Neurological: Nonfocal; cranial nerves II  through XII appear intact   Psychological: Somnolent; pleasant affect  otherwise   Skin: Decubitus ulcers to his buttocks.   There is also a coccygeal pressure ulcer that is covered by a dressing that is dry clean and intact       Recent Lab Results:    Results:    CBC: 9/26/2023 19:47              \     Hgb     /                              \     13.4 L    /  WBC  ----------------  Plt               14.9 H    ----------------    464 H            /     Hct     \                               /     41.5       \            RBC: 4.60     MCV: 90     Neutrophil %: 71.5      CMP: 9/26/2023 19:47  NA+        Cl-     BUN  /                         131 L   98    19  /  --------------------------------  Glucose                ---------------------------  127 H    K+     HCO3-   Creat \                         3.6    25    0.51  \           \  T Bili  /                    \  0.2  /  AST  x ---- x ALT        22 x ---- x 17         /  Alk P   \               /  104  \  Calcium : 9.1     Anion Gap : 12     Albumin : 3.7     T Protein : 7.0           Assessment and Plan:   Assessment:    This is an unfortunate 63-year-old male with a past medical history of an advanced multiple sclerosis complicated by lower extremities paralysis/ upper extremities  paresis/stage IV decubitus coccygeal ulcer and stage II/III ulcerations at bilateral buttocks, neurogenic bladder status post chronic suprapubic Livingston catheter complicated by recurrent urinary tract infections (ESBL, Proteus mirabilis and a multidrug-resistant  procidentia), COPD, s/p splenectomy, pulmonary embolisms, diastolic CHF, hypertension, depression, depression, chronic anemia, ischemic CVA (June 27, 2020) with a residual expressive aphasia, small bowel obstruction status post resection, hyponatremia  , seizure disorder, and frequent hospitalizations for his wounds infections and/or urinary tract infections, Who was sent from the nursing home to the emergency room for progressively worsening fatigue and malaise.  In addition to the recurrent urinary  tract infection with sepsis [leukocytosis, tachycardia, lactic acidosis], patient was found to have this time hyperthyroidism and hyponatremia.    Admit patient to the inpatient medical service with telemetry and vital signs monitoring.  I will treat the recurrent UTI with meropenem 1 g IV every 8 hours scheduled.  Give oral lactobacillus.  IV fluids normal saline at the rate of 75 cc/hour.   Repeat BMP tomorrow.  Repeat CBC tomorrow.  Regarding the hyperthyroidism, patient has had low TSH levels in the past but thyroid panel was grossly within normal limits when it was last checked on October 2022.  I will order repeat free T4 and T3 levels.  Continue with the methimazole 10 mg daily.   And in the morning we will reach out for endocrinology input.  Based on my current clinical assessment, patient does not show any signs or symptoms that could be consistent with Graves' disease or any acute thyroiditis.  We will follow-up with endocrinology  regarding what type of work-up that else can be needed to be done as inpatient.  Continue the rest of home medications  Except for the Lasix and the spironolactone because of the hyponatremia and the sepsis.  SCDs for DVT prophylaxis  Patient is DNR [facility]    (This note was generated with voice recognition software and may contain errors including spelling, grammar, syntax and misrecognition of what was dictated, that are not fully corrected)       Electronic Signatures:  Casper Dhillon)  (Signed 27-Sep-2023 02:18)   Authored: History of Present Illness, Comorbidities,  Allergies, Medications Prior to Admission, Objective, Assessment and Plan, Note Completion      Last Updated: 27-Sep-2023 02:18 by Casper Dhillon)

## 2023-09-30 NOTE — CONSULTS
"Nutrition Assessment Note  Assessment    Assessment:  Reason for Assessment  Reason for Assessment: Admission nursing screening    History:  Food and Nutrient History  Energy Intake: Poor < 50 % (patient reports 25% of usual since admission)  Food and Nutrient History: Usual intake    Breakfast: \"I don't know\", sometimes eggs and toast    Lunch: sandwich or soup    Dinner: \"I'm not sure\"     Beverages: hot tea, milk, and orange juice with breakfast; juice at each meal, water all day     Other: Boost once/day    Diet Experience  Previous Diet / Nutrition Education / Counseling: has had wounds on past admissions and receivied counseling on nutrition for wounds then      Anthropometrics:  Height: 185.4 cm (6' 0.99\")       Weight Change  Weight History / % Weight Change: Reports usual body weight 170 lb (77.1 kg).  States he is unsure if he has lost weight, but thinks he may have.  Significant Weight Loss:  (unable to determine with data available)       IBW/kg (Dietitian Calculated): 83.5 kg  Percent of IBW: 96 %       Energy Needs:  Calculated Energy Needs Using Equations  Height: 185.4 cm (6' 0.99\")  Weight Used for Equation Calculations: 80.3 kg (177 lb 0.5 oz)  Erath- . Carmela Equation (Overweight or Obese Patients): 1642    Estimated Energy Needs  Total Energy Estimated Needs (kCal): 2409 kCal (4367-2239)  Method for Estimating Needs: 30-35 kcal/kg/day    Estimated Protein Needs  Total Protein Estimated Needs (g): 96 g ()  Method for Estimating Needs: 1.2-1.5 gm/kg/day    Estimated Fluid Needs  Total Fluid Estimated Needs (mL): 2409 mL  Method for Estimating Needs: 30 mL/kg/day         Nutrition Focused Physical Findings:  Subcutaneous Fat Loss  Orbital Fat Pads: Mild-Moderate (slight dark circles and slight hollowing)  Buccal Fat Pads: Severe (hollow, sunken and narrow face)  Triceps: Defer (did not assess)  Ribs: Defer (did not assess, patient seated)    Muscle Wasting  Temporalis: Mild-Moderate (slight " depression)  Pectoralis (Clavicular Region): Mild-Moderate (some protrusion of clavicle)  Deltoid/Trapezius: Defer  Interosseous: Defer  Trapezius/Infraspinatus/Supraspinatus (Scapular Region): Defer  Quadriceps: Defer (patient seated/unable to assess)  Gastrocnemius: Defer (patient seated/unable to assess)    Edema  Edema: none       Physical Findings (Nutrition Deficiency/Toxicity)  Hair: Negative  Eyes: Negative  Mouth: Negative  Nails: Negative  Skin: Positive (wound on buttocks per patient)    Diagnosis   Diagnosis:  Malnutrition Diagnosis  Patient has Malnutrition Diagnosis: Yes  Diagnosis Status: New  Malnutrition Diagnosis: Severe malnutrition related to acute disease or injury  As Evidenced by: moderate to severe muscle wasting and fat loss    Patient has Nutrition Diagnosis: Yes  Nutrition Diagnosis 1: Increased nutrient needs  Diagnosis Status (1): New  Related to (1): presence of wound  As Evidenced by (1): protein need 1.2-1.5 gm/kg/day and energy needs 30-35 kcal/kg/day        Interventions/Recommendations   Interventions/Recommendations:  Nutrition Prescription  Individualized Nutrition Prescription Provided for : oral nutrition supplements (Ensure Plus High Protein and Jagdeep, each BID)    Food and/or Nutrient Delivery Interventions  Interventions: Meals and snacks    Meals and Snacks: Energy-modified diet, Protein-modified diet (high kcal and high protein for wound healing)      Education Documentation  Nutrition Care Manual, taught by Teja Nielson RDN, LD at 9/30/2023 12:07 PM.  Learner: Patient  Readiness: Acceptance  Method: Explanation  Response: Verbalizes Understanding  Comment: Educated on wound healing/malnutrition as well as how these indicate use of oral nutrition supplements Jagdeep and Ensure Plus High Protein          Monitoring and Evaluation   Monitoring/Evaluation:  Food and Nutrient Related History  Energy Intake: Estimated energy intake  Anthropometrics: Body Composition/Growth/Weight  History  Weight: Weight change  Biochemical Data, Medical Tests and Procedures  Electrolyte and Renal Panel: Magnesium, Calcium, serum  Nutrition Focused Physical Findings      Follow Up  Time Spent (min): 60 minutes  Nutrition Follow-Up Needed?: Dietitian to reassess per policy

## 2023-09-30 NOTE — CARE PLAN
The patient's goals for the shift include  comfort    The clinical goals for the shift include pain control    Over the shift, the patient did make progress toward the following goals. Pt up to chair for breakfast and had no c/o pain this shift

## 2023-09-30 NOTE — CONSULTS
"General Surgery Consultation    Patient: Raheem Hilton  : 1960  MRN: [id number]  Date of Consultation: 23    Primary Care Provider: Luca Angeles MD  Referring Provider: Dr. Hughes    Chief Complaint: Decubitus ulcer    History of Present Illness: Raheem Hilton is a 63 y.o. old male seen at the request of Casper Dhillon MD for evaluation of a decubitus ulcer.  He was admitted for UTI. He resides in [facility]. He has had a decubitus ulcer on his sacrum that he reports having for several years. He has a wound care specialist see him at the [facility] every Wednesday. HE reports that the wound is much improved compared to what it had previously been. He denies any drainage or odor to it. He denies any recent infections with it. He has multiple sclerosis and is non-ambulatory. However, he has some upper body strength and reports that he can shift his weight somewhat on his own. He is afebrile. He has no pain at this site. He has no drainage from this wound. His WBC is normal.    Medical History:  CVA in   [place]    Surgical History:  No previous surgical intervention on this wound.    Home Medications:  Denies any antiplatelet medications or blood thinners.    Allergies:  is allergic to bactrim [sulfamethoxazole-trimethoprim], cefepime, doxycycline, and heparin.    Family History:   Reviewed, non-contributory to presenting illness    Social History:  Resides in [facility]    ROS:  Constitutional:  no fever, sweats, and chills  Cardiovascular: No chest pain  Respiratory: No cough or shortness of breath  Gastrointestinal: No abdominal pain  Genitourinary: +UTI  Musculoskeletal: +MS with weakness in lower extremities  Integumentary: +sacral decubitus wound, chronic  Neurological: no confusion  Endocrine: no heat or cold intolerance  Heme/Lymph: no easy bruising or bleeding    Objective:  /78   Pulse 66   Temp 36 °C (96.8 °F) (Temporal)   Resp 18   Ht 1.854 m (6' 0.99\")   Wt 80.3 kg (177 lb 0.5 " oz)   SpO2 96%   BMI 23.36 kg/m²     Physical Exam:  Constitutional: No acute distress, conversant, pleasant, sitting upright in chair  Neurologic: alert and oriented  Psych: appropriate affect  Ears, Nose, Mouth and Throat: mucus membranes moist  Pulmonary: No labored breathing  Cardiovascular: Regular rate and rhythm  Abdomen: soft, non-distended, BMI 23  Musculoskeletal: Some muscle wasting of lower extremities  Skin: He has chronic skin changes across the entire sacrum, medial buttocks, and bilateral ischium consistent with previous scarring / healed ulcerations and suspect moisture related skin irritation, he has some loss of tissue overlying the left ischium with accumulation of what appears to be sloughed skin within this crevice but no open wound here and this easily wiped away with a 4x4 gauze, he has epithelialization of the skin in this entire area with the exception of a very small about 3cm x 1.5cm but superficial open wound overlying the left ischium with red granulation tissue at the base, no erythema, no drainage    Labs:  WBC 7.7    Imaging:  CT a/p from 9/29/23 reviewed: decubitus ulcer measures 5.3 x 2.1cm, previously 4.4 x 1.9cm, with a few new subcutaneous gaseous locules. No associated fluid collections. Underlying chronic osteomyelitis of the sacrum and coccyx.    Assessment and Plan: Raheem Hilton is a 63 y.o. old male with a chronic sacral decubitus and bilateral ischial ulcers. These are being well managed by his wound care specialist at his SNF, who sees him weekly. These are healing nicely. These are not infected, and he really only has a single small area of an open wound, the remainder is all epithelialized. Recommend 2x2 gauze dressing changes to the superficial open wound. Otherwise, just recommend good skin care, keeping skin clean and dry. Recommend pressure offloading and optimizing nutrition (although albumin and protein normal on admission). Will sign off, but available if I  can be of further assistance.      Aleksandra Cash MD  9/30/2023

## 2023-09-30 NOTE — PROGRESS NOTES
Pt will return to [place] [facility] at discharge where he resides for long-term care. SW requested CNC attach updates in [facility]. Care Transitions will continue to follow.

## 2023-09-30 NOTE — PROGRESS NOTES
Service: Medicine     Subjective Data:   ARNOLD VO is a 63 year old Male who is Hospital Day # 4.     Patient denies any symptoms of fever chills burning dysuria or hematuria or focal weakness or blurry vision.  Denies any other complaints  Baseline generalized weakness deconditioning  Discussed with RN, ulcer in the back do not look infected.    Objective Data:     Objective Information:      T   P  R  BP   MAP  SpO2   Value  36.3  82  17  129/86   99  95%  Date/Time 9/29 19:12 9/29 20:00 9/29 20:00 9/29 20:00  9/29 20:00 9/29 20:00  Range  (36.1C - 36.6C )  (57 - 103 )  (13 - 19 )  (99 - 152 )/ (55 - 95 )  (69 - 106 )  (93% - 97% )      Pain reported at 9/29 7:45: 0 = None    Physical Exam Narrative:  ·  Physical Exam:    Constitutional:  awake and alert and oriented  to person and place and time; no apparent distress respiratory distress progress mildly ill-appearing   Eyes: PERRL, EOMI, clear sclera   ENMT: mucous membranes moist, oropharynx  clear   Head/Neck: Normocephalic; neck supple, no  apparent injury, thyroid without mass or tenderness, No JVD, trachea midline, no bruits   Respiratory/Thorax: There is some faint coarse  upper airway breath sounds; peripheral lung fields are clear otherwise   Cardiovascular: regular rate; regular rhythm;  normal S1-S2 with no murmur; trace edema and 1+ pulses bilaterally   Gastrointestinal: Soft, nontender, nondistended,  positive bowel sounds.  Suprapubic Livingston catheter.   Neurological: Nonfocal; cranial nerves II  through XII appear intact   Psychological: Somnolent; pleasant affect  otherwise   Skin: Decubitus ulcers to his buttocks.   There is also a coccygeal pressure ulcer that is covered by a dressing that is dry clean and intact       Medication:    Medications:      ALTERNATIVE MEDICINES:    1. Lactobacillus:  1  each  Oral  3 Times a Day      ANTI-INFECTIVES:    1. Meropenem IV Piggy Back:  1  gram(s)  IntraVenous Piggyback  Every 8 Hours       CARDIOVASCULAR AGENTS:    1. Metoprolol Succinate Extended Release:  25  mg  Oral  Daily      CENTRAL NERVOUS SYSTEM AGENTS:    1. Acetaminophen:  650  mg  Oral  Every 4 Hours   PRN       2. Acetaminophen:  650  mg  Oral  Every 4 Hours   PRN       3. carBAMazepine:  200  mg  Oral  2 Times a Day    4. Gabapentin:  600  mg  Oral  3 Times a Day    5. Ondansetron Injectable:  4  mg  IntraVenous Push  Every 4 Hours   PRN       6. Baclofen:  10  mg  Oral  2 Times a Day      GASTROINTESTINAL AGENTS:    1. Magnesium Hydroxide Oral Liquid:  10  mL  Oral  Every 24 Hours   PRN       2. Polyethylene Glycol:  17  gram(s)  Oral  Daily      HORMONES/HORMONE MODIFIERS:    1. methIMAzole:  5  mg  Oral  Every 8 Hours      METABOLIC AGENTS:    1. Atorvastatin:  80  mg  Oral  Daily      NUTRITIONAL PRODUCTS:    1. Sodium Chloride 0.9% Infusion:  1000  mL  IntraVenous  <Continuous>          Currently Suspended Medications       --------------------------------    1. Methenamine Hippurate:  1  gram(s)  Oral  Every 12 Hours      Recent Lab Results:    Results:    CBC: 9/29/2023 05:07              \     Hgb     /                              \     11.6 L    /  WBC  ----------------  Plt               8.3       ----------------    410              /     Hct     \                              /     36.4 L    \            RBC: 4.00 L    MCV: 91           BMP: 9/29/2023 05:07  NA+        Cl-     BUN  /                         133 L   104    8  /  --------------------------------  Glucose                ---------------------------  88    K+     HCO3-   Creat \                         3.6  24    0.37 L \  Calcium : 8.2 L    Anion Gap : 9 L      Radiology Results:    Results:        Impression:    Mildly enlarged sacral decubitus ulcer with few subcutaneous gaseous  locules without drainable abscess. Underlying chronic osteomyelitis.     Resolved right hydroureteronephrosis.     MACRO:  None     CT Abdomen and Pelvis with IV Contrast  [Sep 29 2023  2:19PM]      Impression:    No acute cardiopulmonary process.     Xray Chest 1 View [Sep 26 2023  8:43PM]      Assessment and Plan:   Code Status:  ·  Code Status Full Code     Assessment:     63-year-old male with a past medical history of an advanced multiple sclerosis complicated by lower extremities paralysis/ upper extremities paresis/stage IV decubitus  coccygeal ulcer and stage II/III ulcerations at bilateral buttocks, neurogenic bladder status post chronic suprapubic Livingston catheter complicated by recurrent urinary tract infections (ESBL, Proteus mirabilis and a multidrug-resistant procidentia), COPD,  s/p splenectomy, pulmonary embolisms, diastolic CHF, hypertension, depression, depression, chronic anemia, ischemic CVA (June 27, 2020) with a residual expressive aphasia, small bowel obstruction status post resection, hyponatremia , seizure disorder,  and frequent hospitalizations for his wounds infections and/or urinary tract infections, Who was sent from the nursing home to the emergency room for progressively worsening fatigue and malaise.  In addition to the recurrent urinary tract infection with  sepsis [leukocytosis, tachycardia, lactic acidosis], patient was found to have this time hyperthyroidism and hyponatremia.      plan  on meropenem 1 g IV every 8 hours scheduled.  Give oral lactobacillus.  IV fluids normal saline at the rate of 75 cc/hour.  Repeat BMP tomorrow.  Repeat CBC tomorrow.  Regarding the hyperthyroidism, patient has had low TSH levels in the past but thyroid panel was grossly within normal limits when it was last checked on October 2022.  Free T4 elevated and low TSH while T3 level fine, follow endocrineOutpatient  Watch any symptoms for now  Methimazole  Continue the rest of home medications  Except for the Lasix and the spironolactone because of the hyponatremia and the sepsis.  improving   SCDs for DVT prophylaxis  Patient is DNR [facility]  CT abdomen pelvis no abscess  Urine  culture no growth       Electronic Signatures:  Deondre Hughes)  (Signed 29-Sep-2023 20:44)   Authored: Service, Subjective Data, Objective Data, Assessment  and Plan, Note Completion      Last Updated: 29-Sep-2023 20:44 by Deondre Hughes)

## 2023-09-30 NOTE — PROGRESS NOTES
Service: Medicine     Subjective Data:   ARNOLD VO is a 63 year old Male who is Hospital Day # 3.     Patient denies any symptoms of fever chills burning dysuria or hematuria or focal weakness or blurry vision.  Denies any other complaints  Baseline generalized weakness deconditioning.    Objective Data:     Objective Information:      T   P  R  BP   MAP  SpO2   Value  36.6  78  17  151/78   98  96%  Date/Time 9/28 19:59 9/28 19:59 9/28 19:59 9/28 19:59  9/28 19:59 9/28 19:59  Range  (36.4C - 36.7C )  (54 - 78 )  (11 - 19 )  (98 - 151 )/ (55 - 81 )  (69 - 100 )  (93% - 97% )      Pain reported at 9/28 8:00: 0 = None    Physical Exam Narrative:  ·  Physical Exam:    Constitutional:  awake and alert and oriented  to person and place and time; no apparent distress respiratory distress progress mildly ill-appearing   Eyes: PERRL, EOMI, clear sclera   ENMT: mucous membranes moist, oropharynx  clear   Head/Neck: Normocephalic; neck supple, no  apparent injury, thyroid without mass or tenderness, No JVD, trachea midline, no bruits   Respiratory/Thorax: There is some faint coarse  upper airway breath sounds; peripheral lung fields are clear otherwise   Cardiovascular: regular rate; regular rhythm;  normal S1-S2 with no murmur; trace edema and 1+ pulses bilaterally   Gastrointestinal: Soft, nontender, nondistended,  positive bowel sounds.  Suprapubic Livingston catheter.   Neurological: Nonfocal; cranial nerves II  through XII appear intact   Psychological: Somnolent; pleasant affect  otherwise   Skin: Decubitus ulcers to his buttocks.   There is also a coccygeal pressure ulcer that is covered by a dressing that is dry clean and intact       Medication:    Medications:      ALTERNATIVE MEDICINES:    1. Lactobacillus:  1  each  Oral  3 Times a Day      ANTI-INFECTIVES:    1. Meropenem IV Piggy Back:  1  gram(s)  IntraVenous Piggyback  Every 8 Hours      CARDIOVASCULAR AGENTS:    1. Metoprolol Succinate Extended  Release:  25  mg  Oral  Daily      CENTRAL NERVOUS SYSTEM AGENTS:    1. Acetaminophen:  650  mg  Oral  Every 4 Hours   PRN       2. Acetaminophen:  650  mg  Oral  Every 4 Hours   PRN       3. carBAMazepine:  200  mg  Oral  2 Times a Day    4. Gabapentin:  600  mg  Oral  3 Times a Day    5. Ondansetron Injectable:  4  mg  IntraVenous Push  Every 4 Hours   PRN       6. Baclofen:  10  mg  Oral  2 Times a Day      GASTROINTESTINAL AGENTS:    1. Magnesium Hydroxide Oral Liquid:  10  mL  Oral  Every 24 Hours   PRN       2. Polyethylene Glycol:  17  gram(s)  Oral  Daily      METABOLIC AGENTS:    1. Atorvastatin:  80  mg  Oral  Daily      NUTRITIONAL PRODUCTS:    1. Sodium Chloride 0.9% Infusion:  1000  mL  IntraVenous  <Continuous>          Currently Suspended Medications       --------------------------------    1. Methenamine Hippurate:  1  gram(s)  Oral  Every 12 Hours      Recent Lab Results:    Results:    CBC: 9/28/2023 05:04              \     Hgb     /                              \     11.6 L    /  WBC  ----------------  Plt               7.7       ----------------    432              /     Hct     \                              /     36.5 L    \            RBC: 3.98 L    MCV: 92     Neutrophil %: 50.1      BMP: 9/28/2023 05:04  NA+        Cl-     BUN  /                         137    108 H   10  /  --------------------------------  Glucose                ---------------------------  90    K+     HCO3-   Creat \                         3.7  24    0.38 L \  Calcium : 8.4 L    Anion Gap : 9 L      Radiology Results:    Results:        Impression:    No acute cardiopulmonary process.     Xray Chest 1 View [Sep 26 2023  8:43PM]      Assessment and Plan:   Code Status:  ·  Code Status Full Code     Assessment:     63-year-old male with a past medical history of an advanced multiple sclerosis complicated by lower extremities paralysis/ upper extremities paresis/stage IV decubitus  coccygeal ulcer and stage  II/III ulcerations at bilateral buttocks, neurogenic bladder status post chronic suprapubic Livingston catheter complicated by recurrent urinary tract infections (ESBL, Proteus mirabilis and a multidrug-resistant procidentia), COPD,  s/p splenectomy, pulmonary embolisms, diastolic CHF, hypertension, depression, depression, chronic anemia, ischemic CVA (June 27, 2020) with a residual expressive aphasia, small bowel obstruction status post resection, hyponatremia , seizure disorder,  and frequent hospitalizations for his wounds infections and/or urinary tract infections, Who was sent from the nursing home to the emergency room for progressively worsening fatigue and malaise.  In addition to the recurrent urinary tract infection with  sepsis [leukocytosis, tachycardia, lactic acidosis], patient was found to have this time hyperthyroidism and hyponatremia.      plan  treat the recurrent UTI with meropenem 1 g IV every 8 hours scheduled.  Give oral lactobacillus.  IV fluids normal saline at the rate of 75 cc/hour.  Repeat BMP tomorrow.  Repeat CBC tomorrow.  Regarding the hyperthyroidism, patient has had low TSH levels in the past but thyroid panel was grossly within normal limits when it was last checked on October 2022.  Free T4 elevated and low TSH while T3 level fine, follow endocrineOutpatient  Watch any symptoms for now  Methimazole  Continue the rest of home medications  Except for the Lasix and the spironolactone because of the hyponatremia and the sepsis.  improving   SCDs for DVT prophylaxis  Patient is DNR [facility]        Electronic Signatures:  Deondre Hughes)  (Signed 28-Sep-2023 20:34)   Authored: Service, Subjective Data, Objective Data, Assessment  and Plan, Note Completion      Last Updated: 28-Sep-2023 20:34 by Deondre Hughes)

## 2023-10-01 LAB
ANION GAP SERPL CALC-SCNC: 8 MMOL/L (ref 10–20)
BUN SERPL-MCNC: 10 MG/DL (ref 6–23)
CALCIUM SERPL-MCNC: 8.3 MG/DL (ref 8.6–10.3)
CHLORIDE SERPL-SCNC: 106 MMOL/L (ref 98–107)
CO2 SERPL-SCNC: 24 MMOL/L (ref 21–32)
CREAT SERPL-MCNC: 0.36 MG/DL (ref 0.5–1.3)
ERYTHROCYTE [DISTWIDTH] IN BLOOD BY AUTOMATED COUNT: 13.9 % (ref 11.5–14.5)
GFR SERPL CREATININE-BSD FRML MDRD: >90 ML/MIN/1.73M*2
GLUCOSE SERPL-MCNC: 92 MG/DL (ref 74–99)
HCT VFR BLD AUTO: 38.5 % (ref 41–52)
HGB BLD-MCNC: 12.3 G/DL (ref 13.5–17.5)
MCH RBC QN AUTO: 29.2 PG (ref 26–34)
MCHC RBC AUTO-ENTMCNC: 31.9 G/DL (ref 32–36)
MCV RBC AUTO: 91 FL (ref 80–100)
NRBC BLD-RTO: 0 /100 WBCS (ref 0–0)
PLATELET # BLD AUTO: 377 X10*3/UL (ref 150–450)
PMV BLD AUTO: 9.2 FL (ref 7.5–11.5)
POTASSIUM SERPL-SCNC: 3.7 MMOL/L (ref 3.5–5.3)
RBC # BLD AUTO: 4.21 X10*6/UL (ref 4.5–5.9)
SODIUM SERPL-SCNC: 134 MMOL/L (ref 136–145)
WBC # BLD AUTO: 8.6 X10*3/UL (ref 4.4–11.3)

## 2023-10-01 PROCEDURE — 2500000004 HC RX 250 GENERAL PHARMACY W/ HCPCS (ALT 636 FOR OP/ED): Performed by: INTERNAL MEDICINE

## 2023-10-01 PROCEDURE — 94640 AIRWAY INHALATION TREATMENT: CPT

## 2023-10-01 PROCEDURE — 80048 BASIC METABOLIC PNL TOTAL CA: CPT | Performed by: HOSPITALIST

## 2023-10-01 PROCEDURE — 36415 COLL VENOUS BLD VENIPUNCTURE: CPT | Performed by: HOSPITALIST

## 2023-10-01 PROCEDURE — 1100000001 HC PRIVATE ROOM DAILY

## 2023-10-01 PROCEDURE — 99233 SBSQ HOSP IP/OBS HIGH 50: CPT | Performed by: HOSPITALIST

## 2023-10-01 PROCEDURE — 2500000001 HC RX 250 WO HCPCS SELF ADMINISTERED DRUGS (ALT 637 FOR MEDICARE OP): Performed by: INTERNAL MEDICINE

## 2023-10-01 PROCEDURE — 2500000002 HC RX 250 W HCPCS SELF ADMINISTERED DRUGS (ALT 637 FOR MEDICARE OP, ALT 636 FOR OP/ED): Performed by: INTERNAL MEDICINE

## 2023-10-01 PROCEDURE — 85027 COMPLETE CBC AUTOMATED: CPT | Performed by: HOSPITALIST

## 2023-10-01 PROCEDURE — 2020000001 HC ICU ROOM DAILY

## 2023-10-01 RX ADMIN — BACLOFEN 10 MG: 10 TABLET ORAL at 08:53

## 2023-10-01 RX ADMIN — CARBAMAZEPINE 200 MG: 200 TABLET ORAL at 08:54

## 2023-10-01 RX ADMIN — GABAPENTIN 600 MG: 300 CAPSULE ORAL at 08:53

## 2023-10-01 RX ADMIN — POLYETHYLENE GLYCOL 3350 17 G: 17 POWDER, FOR SOLUTION ORAL at 08:53

## 2023-10-01 RX ADMIN — GABAPENTIN 600 MG: 300 CAPSULE ORAL at 21:18

## 2023-10-01 RX ADMIN — ALBUTEROL SULFATE 2.5 MG: 2.5 SOLUTION RESPIRATORY (INHALATION) at 11:09

## 2023-10-01 RX ADMIN — Medication 1 G: at 02:00

## 2023-10-01 RX ADMIN — Medication 1 CAPSULE: at 15:28

## 2023-10-01 RX ADMIN — CARBAMAZEPINE 200 MG: 200 TABLET ORAL at 21:18

## 2023-10-01 RX ADMIN — METOPROLOL SUCCINATE 25 MG: 25 TABLET, EXTENDED RELEASE ORAL at 08:50

## 2023-10-01 RX ADMIN — Medication 1 G: at 10:00

## 2023-10-01 RX ADMIN — METHIMAZOLE 5 MG: 5 TABLET ORAL at 06:17

## 2023-10-01 RX ADMIN — ONDANSETRON 4 MG: 2 INJECTION INTRAMUSCULAR; INTRAVENOUS at 18:19

## 2023-10-01 RX ADMIN — ALBUTEROL SULFATE 2.5 MG: 2.5 SOLUTION RESPIRATORY (INHALATION) at 20:28

## 2023-10-01 RX ADMIN — Medication 1 CAPSULE: at 21:18

## 2023-10-01 RX ADMIN — ALBUTEROL SULFATE 2.5 MG: 2.5 SOLUTION RESPIRATORY (INHALATION) at 06:50

## 2023-10-01 RX ADMIN — SODIUM CHLORIDE 75 ML/HR: 9 INJECTION, SOLUTION INTRAVENOUS at 00:50

## 2023-10-01 RX ADMIN — ATORVASTATIN CALCIUM 80 MG: 80 TABLET, FILM COATED ORAL at 21:18

## 2023-10-01 RX ADMIN — GABAPENTIN 600 MG: 300 CAPSULE ORAL at 15:28

## 2023-10-01 RX ADMIN — Medication 1 CAPSULE: at 08:53

## 2023-10-01 RX ADMIN — METHIMAZOLE 5 MG: 5 TABLET ORAL at 21:18

## 2023-10-01 RX ADMIN — METHIMAZOLE 5 MG: 5 TABLET ORAL at 15:28

## 2023-10-01 ASSESSMENT — COGNITIVE AND FUNCTIONAL STATUS - GENERAL
MOVING TO AND FROM BED TO CHAIR: TOTAL
HELP NEEDED FOR BATHING: TOTAL
PERSONAL GROOMING: A LITTLE
TOILETING: TOTAL
DRESSING REGULAR UPPER BODY CLOTHING: TOTAL
CLIMB 3 TO 5 STEPS WITH RAILING: TOTAL
EATING MEALS: A LITTLE
DAILY ACTIVITIY SCORE: 10
TURNING FROM BACK TO SIDE WHILE IN FLAT BAD: TOTAL
MOVING FROM LYING ON BACK TO SITTING ON SIDE OF FLAT BED WITH BEDRAILS: TOTAL
MOBILITY SCORE: 6
WALKING IN HOSPITAL ROOM: TOTAL
DRESSING REGULAR LOWER BODY CLOTHING: TOTAL
STANDING UP FROM CHAIR USING ARMS: TOTAL

## 2023-10-01 ASSESSMENT — PAIN - FUNCTIONAL ASSESSMENT
PAIN_FUNCTIONAL_ASSESSMENT: 0-10

## 2023-10-01 ASSESSMENT — PAIN SCALES - GENERAL
PAINLEVEL_OUTOF10: 4
PAINLEVEL_OUTOF10: 0 - NO PAIN

## 2023-10-01 NOTE — CARE PLAN
The patient's goals for the shift include  Wound Care; IVPB ABX; Pain control; SOB w/dyspnea; Turning/Repositioning; Elimination.    Last Bm 2130 on 9/30 (Loose, Brown)   -0630 on 10/1  Arrhythmia: 1st Degree AV Block  Loose Stools -- Not Diarrhea as seen on Infection Control. This RN's Mistake.  Patient cooperated well with turning schedule. No focal c/o from patient at this time.  The clinical goals for the shift include pain control    Over the shift, the patient did not make progress toward the following goals. Barriers to progression include improper isolation precautions. Recommendations to address these barriers include Fix the isolation precautions to not reflect diarrhea.

## 2023-10-01 NOTE — PROGRESS NOTES
Raheem Hilton is a 63 y.o. male on day 5 of admission presenting with No Principal Problem: There is no principal problem currently on the Problem List. Please update the Problem List and refresh..      Subjective   Pat doing well,awake and alert.Has been eating well,had good BM this morning.No blood.No soreness in back.No n/v/d.Urinating well.Had good sleep.Feels close to baseline.No new complains.     Objective     Last Recorded Vitals  /75   Pulse 96   Temp 36.5 °C (97.7 °F) (Temporal)   Resp 22   Wt 80.3 kg (177 lb 0.5 oz)   SpO2 96%   Intake/Output last 3 Shifts:    Intake/Output Summary (Last 24 hours) at 10/1/2023 1257  Last data filed at 10/1/2023 1200  Gross per 24 hour   Intake 3675 ml   Output 4050 ml   Net -375 ml       Admission Weight  Weight: 80.3 kg (177 lb 0.5 oz) (09/28/23 0806)    Daily Weight  09/28/23 : 80.3 kg (177 lb 0.5 oz)    Image Results  CT abdomen pelvis w IV contrast  Narrative: Interpreted By:  SHOLA HUNTLEY DO  MRN: [id number]  Patient Name: RAHEEM HILTON     STUDY:  CT ABDOMEN AND PELVIS W IV CONTRAST;  9/29/2023 12:09 pm     INDICATION:  Abscess .     COMPARISON:  CT abdomen and pelvis 05/26/2023     ACCESSION NUMBER(S):  [id number]     ORDERING CLINICIAN:  JESUS MANUEL SALAZAR     TECHNIQUE:  CT of the abdomen and pelvis was performed following intravenous  administration of 90 ml of contrast Omnipaque 350. Standard  contiguous axial images were obtained at 3 mm slice thickness through  the abdomen and pelvis. Coronal and sagittal reconstructions at 3 mm  slice thickness were performed.     FINDINGS:  LOWER CHEST:  Persistent right lower lobe atelectasis without new focal  consolidation or pleural effusion.     ABDOMEN:     LIVER:  No focal liver lesions are seen.     BILE DUCTS:  The intrahepatic and extrahepatic ducts are not dilated.     GALLBLADDER:  There is no cholelithiasis or gallbladder wall thickening.     PANCREAS:  The pancreas appears unremarkable without evidence of  ductal  dilatation or masses.     SPLEEN:  Splenectomy.     ADRENAL GLANDS:  Bilateral adrenal glands appear normal.     KIDNEYS AND URETERS:  The kidneys are normal in size and enhance symmetrically. Bilateral  subcentimeter low-attenuation lesions are too small to further  characterize but likely benign. Resolved right hydroureteronephrosis.  No new hydroureteronephrosis or nephroureterolithiasis is identified.     PELVIS:     BLADDER:  The bladder collapses around suprapubic Livingston catheter.     REPRODUCTIVE ORGANS:  The prostate is enlarged, measuring 4.9 cm in the transverse  dimension.     BOWEL:  Status post enteroenteric and colocolonic anastomoses. Moderate  amount of stool throughout the colon and rectum. There is no bowel  wall thickening or dilation. The appendix is normal.     VESSELS:  Mild-to-moderate atherosclerotic calcifications are seen within the  abdominal aorta without aneurysmal dilation. The celiac trunk and SMA  are patent. The hepatic, portal, superior mesenteric and splenic  veins are well opacified. The IVC is normal.     PERITONEUM/RETROPERITONEUM/LYMPH NODES:  There is no ascites, free air or fluid collection. Similar presacral  and fat stranding are present. Stable few mildly enlarged and  prominent retroperitoneal, pelvic and inguinal lymph nodes, favored  reactive, for example:  * 11 mm left para-aortic node (series 2, image 65)  * 7 mm left external iliac node (image 141)  * 12 mm left inguinal node (image 138)     No progressive abdominopelvic lymphadenopathy is present.     ABDOMINAL WALL AND BONES :  Increased size of sacral decubitus ulcer measuring 5.3 x 2.1 cm  (series 2, image 135), previously 4.4 x 1.9 cm, with a few new  subcutaneous gaseous locules. No associated fluid collection. There  is underlying chronic osteomyelitis of the sacrum and coccyx.     No suspicious osseous lesions are identified. There is multilevel  degenerative disc disease with unchanged moderate  chronic mild T12  and moderate L3 compression deformities.     Impression: Mildly enlarged sacral decubitus ulcer with few subcutaneous gaseous  locules without drainable abscess. Underlying chronic osteomyelitis.     Resolved right hydroureteronephrosis.     MACRO:  None      Physical Exam  Constitutional:  awake and alert and oriented  to person and place and time; no apparent distress respiratory distress progress mildly ill-appearing   Eyes: PERRL, EOMI, clear sclera   ENMT: mucous membranes moist, oropharynx  clear   Head/Neck: Normocephalic; neck supple, no  apparent injury, thyroid without mass or tenderness, No JVD, trachea midline, no bruits   Respiratory/Thorax: There is some faint coarse  upper airway breath sounds; peripheral lung fields are clear otherwise   Cardiovascular: regular rate; regular rhythm;  normal S1-S2 with no murmur; trace edema and 1+ pulses bilaterally   Gastrointestinal: Soft, nontender, nondistended,  positive bowel sounds.  Suprapubic Livingston catheter.   Neurological: Nonfocal; cranial nerves II  through XII appear intact   Psychological: Somnolent; pleasant affect  otherwise   Skin: Decubitus ulcers to his buttocks.   There is also a coccygeal pressure ulcer that is covered by a dressing that is dry clean and intact       Relevant Results               Assessment/Plan   This patient currently has cardiac telemetry ordered; if you would like to modify or discontinue the telemetry order, click here to go to the orders activity to modify/discontinue the order.  stage IV decubitus  coccygeal ulcer and stage II/III ulcerations at bilateral buttocks   neurogenic bladder status post chronic suprapubic Livingston catheter complicated by recurrent urinary tract infections (ESBL, Proteus mirabilis and a multidrug-resistant procidentia)   hyperthyroidism   hyponatremia.   advanced multiple sclerosis complicated by lower extremities paralysis/ upper extremities paresis     Plan:  Finish meropenem 1 g  IV every 8 hours scheduled.  Continue oral lactobacillus.   Free T4 elevated and low TSH while T3 level stable,follow endocrine  Outpatient .  SCDs for DVT prophylaxis  Patient is DNR [facility].  Cx neg.Stop ABX.  Possible discharge back to [facility] in am.        Active Problems:  There are no active Hospital Problems.          Malnutrition Diagnosis Status: New  Malnutrition Diagnosis: Severe malnutrition related to acute disease or injury  As Evidenced by: moderate to severe muscle wasting and fat loss  I agree with the dietitian's malnutrition diagnosis.         Sarahi Deleon MD

## 2023-10-01 NOTE — PROGRESS NOTES
"  Subjective   Patient denies any complaints at this time       Objective     Physical Exam  Constitutional:  awake and alert and oriented  to person and place and time; no apparent distress respiratory distress progress mildly ill-appearing   Eyes: PERRL, EOMI, clear sclera   ENMT: mucous membranes moist, oropharynx  clear   Head/Neck: Normocephalic; neck supple, no  apparent injury, thyroid without mass or tenderness, No JVD, trachea midline, no bruits   Respiratory/Thorax: There is some faint coarse  upper airway breath sounds; peripheral lung fields are clear otherwise   Cardiovascular: regular rate; regular rhythm;  normal S1-S2 with no murmur; trace edema and 1+ pulses bilaterally   Gastrointestinal: Soft, nontender, nondistended,  positive bowel sounds.  Suprapubic Livingston catheter.   Neurological: Nonfocal; cranial nerves II  through XII appear intact   Psychological: Somnolent; pleasant affect  otherwise   Skin: Decubitus ulcers to his buttocks.   There is also a coccygeal pressure ulcer that is covered by a dressing that is dry clean and intact     Last Recorded Vitals  Blood pressure (!) 151/93, pulse 95, temperature 37.1 °C (98.8 °F), resp. rate 20, height 1.854 m (6' 0.99\"), weight 80.3 kg (177 lb 0.5 oz), SpO2 96 %.  Intake/Output last 3 Shifts:  I/O last 3 completed shifts:  In: 2530 (31.5 mL/kg) [P.O.:860; I.V.:1370 (17.1 mL/kg); IV Piggyback:300]  Out: 3400 (42.3 mL/kg) [Urine:3400 (1.2 mL/kg/hr)]  Weight: 80.3 kg     Relevant Results      Lab Results   Component Value Date    WBC 7.7 09/30/2023    HGB 11.8 (L) 09/30/2023    HCT 36.6 (L) 09/30/2023     09/30/2023    CHOL 136 06/28/2020    TRIG 230 (H) 06/28/2020    HDL 22.8 (A) 06/28/2020    ALT 17 09/26/2023    AST 22 09/26/2023     (L) 09/30/2023    K 3.5 09/30/2023     09/30/2023    CREATININE 0.41 (L) 09/30/2023    BUN 8 09/30/2023    CO2 24 09/30/2023    TSH 0.01 (L) 09/26/2023    INR 1.2 (H) 04/09/2023    HGBA1C 6.2 (H) " 12/28/2021       Scheduled medications  atorvastatin, 80 mg, oral, Nightly  baclofen, 10 mg, oral, BID  carBAMazepine, 200 mg, oral, BID  gabapentin, 600 mg, oral, TID  lactobacillus acidophilus, 1 capsule, oral, TID  meropenem, 1 g, intravenous, q8h  methIMAzole, 5 mg, oral, q8h CARMELO  metoprolol succinate XL, 25 mg, oral, Daily  polyethylene glycol, 17 g, oral, Daily      Continuous medications  sodium chloride 0.9%, 75 mL/hr, Last Rate: 75 mL/hr (09/30/23 2006)      PRN medications  PRN medications: acetaminophen, albuterol, magnesium hydroxide, morphine, ondansetron        Malnutrition Diagnosis Status: New  Malnutrition Diagnosis: Severe malnutrition related to acute disease or injury  As Evidenced by: moderate to severe muscle wasting and fat loss  I agree with the dietitian's malnutrition diagnosis.      Assessment/Plan   Active Problems:  There are no active Hospital Problems.     63-year-old male with a past medical history of an advanced multiple sclerosis complicated by lower extremities paralysis/ upper extremities paresis/stage IV decubitus  coccygeal ulcer and stage II/III ulcerations at bilateral buttocks, neurogenic bladder status post chronic suprapubic Livingston catheter complicated by recurrent urinary tract infections (ESBL, Proteus mirabilis and a multidrug-resistant procidentia), COPD,  s/p splenectomy, pulmonary embolisms, diastolic CHF, hypertension, depression, depression, chronic anemia, ischemic CVA (June 27, 2020) with a residual expressive aphasia, small bowel obstruction status post resection, hyponatremia , seizure disorder,  and frequent hospitalizations for his wounds infections and/or urinary tract infections, Who was sent from the nursing home to the emergency room for progressively worsening fatigue and malaise.  In addition to the recurrent urinary tract infection with concern of sepsis [leukocytosis, tachycardia, lactic acidosis], patient was found to have this time hyperthyroidism  and hyponatremia.        plan  on meropenem 1 g IV every 8 hours scheduled.  Give oral lactobacillus.  IV fluids normal saline at the rate of 75 cc/hour.  Repeat BMP tomorrow.  Repeat CBC tomorrow.  Regarding the hyperthyroidism, patient has had low TSH levels in the past but thyroid panel was grossly within normal limits when it was last checked on October 2022.  Free T4 elevated and low TSH while T3 level fine, follow endocrine  Outpatient  Watch any symptoms for now  Methimazole  Continue the rest of home medications  Except for the Lasix and the spironolactone because of the hyponatremia for now  improving   SCDs for DVT prophylaxis  Patient is DNR [facility]  CT abdomen pelvis no abscess and surgery signed off, recommend 2 x 2 gauze dressing changes to superficial open wound  Urine culture no growth   Stop antibiotics by tomorrow likely since no source of infection at this time and if all parameters clinically as well lab so look sara Hughes MD

## 2023-10-01 NOTE — CARE PLAN
The patient's goals for the shift include  getting rest     The clinical goals for the shift include cluster care     Over the shift, the patient did take a nap after breakfast and stated he felt better afterwards

## 2023-10-02 VITALS
BODY MASS INDEX: 23.46 KG/M2 | RESPIRATION RATE: 16 BRPM | SYSTOLIC BLOOD PRESSURE: 144 MMHG | DIASTOLIC BLOOD PRESSURE: 85 MMHG | HEIGHT: 73 IN | HEART RATE: 75 BPM | WEIGHT: 177.03 LBS | TEMPERATURE: 97.7 F | OXYGEN SATURATION: 95 %

## 2023-10-02 PROBLEM — L89.159 SACRAL DECUBITUS ULCER: Status: ACTIVE | Noted: 2023-10-02

## 2023-10-02 PROBLEM — T83.9XXA URINARY CATHETER COMPLICATION (CMS-HCC): Status: ACTIVE | Noted: 2023-10-02

## 2023-10-02 LAB
ANION GAP SERPL CALC-SCNC: 10 MMOL/L (ref 10–20)
BASOPHILS # BLD AUTO: 0.07 X10*3/UL (ref 0–0.1)
BASOPHILS NFR BLD AUTO: 0.8 %
BUN SERPL-MCNC: 14 MG/DL (ref 6–23)
CALCIUM SERPL-MCNC: 8.5 MG/DL (ref 8.6–10.3)
CHLORIDE SERPL-SCNC: 104 MMOL/L (ref 98–107)
CO2 SERPL-SCNC: 23 MMOL/L (ref 21–32)
CREAT SERPL-MCNC: 0.38 MG/DL (ref 0.5–1.3)
EOSINOPHIL # BLD AUTO: 1.27 X10*3/UL (ref 0–0.7)
EOSINOPHIL NFR BLD AUTO: 13.9 %
ERYTHROCYTE [DISTWIDTH] IN BLOOD BY AUTOMATED COUNT: 14.3 % (ref 11.5–14.5)
GFR SERPL CREATININE-BSD FRML MDRD: >90 ML/MIN/1.73M*2
GLUCOSE SERPL-MCNC: 88 MG/DL (ref 74–99)
HCT VFR BLD AUTO: 38.8 % (ref 41–52)
HGB BLD-MCNC: 12.1 G/DL (ref 13.5–17.5)
IMM GRANULOCYTES # BLD AUTO: 0.02 X10*3/UL (ref 0–0.7)
IMM GRANULOCYTES NFR BLD AUTO: 0.2 % (ref 0–0.9)
LYMPHOCYTES # BLD AUTO: 2.14 X10*3/UL (ref 1.2–4.8)
LYMPHOCYTES NFR BLD AUTO: 23.4 %
MCH RBC QN AUTO: 28.6 PG (ref 26–34)
MCHC RBC AUTO-ENTMCNC: 31.2 G/DL (ref 32–36)
MCV RBC AUTO: 92 FL (ref 80–100)
MONOCYTES # BLD AUTO: 1.13 X10*3/UL (ref 0.1–1)
MONOCYTES NFR BLD AUTO: 12.3 %
NEUTROPHILS # BLD AUTO: 4.53 X10*3/UL (ref 1.2–7.7)
NEUTROPHILS NFR BLD AUTO: 49.4 %
NRBC BLD-RTO: 0 /100 WBCS (ref 0–0)
PLATELET # BLD AUTO: 384 X10*3/UL (ref 150–450)
PMV BLD AUTO: 9.1 FL (ref 7.5–11.5)
POTASSIUM SERPL-SCNC: 3.7 MMOL/L (ref 3.5–5.3)
RBC # BLD AUTO: 4.23 X10*6/UL (ref 4.5–5.9)
SARS-COV-2 RNA RESP QL NAA+PROBE: NOT DETECTED
SODIUM SERPL-SCNC: 133 MMOL/L (ref 136–145)
WBC # BLD AUTO: 9.2 X10*3/UL (ref 4.4–11.3)

## 2023-10-02 PROCEDURE — 36415 COLL VENOUS BLD VENIPUNCTURE: CPT | Performed by: HOSPITALIST

## 2023-10-02 PROCEDURE — 99239 HOSP IP/OBS DSCHRG MGMT >30: CPT | Performed by: HOSPITALIST

## 2023-10-02 PROCEDURE — 87635 SARS-COV-2 COVID-19 AMP PRB: CPT | Performed by: HOSPITALIST

## 2023-10-02 PROCEDURE — 2500000001 HC RX 250 WO HCPCS SELF ADMINISTERED DRUGS (ALT 637 FOR MEDICARE OP): Performed by: INTERNAL MEDICINE

## 2023-10-02 PROCEDURE — 80048 BASIC METABOLIC PNL TOTAL CA: CPT | Performed by: HOSPITALIST

## 2023-10-02 PROCEDURE — 2500000004 HC RX 250 GENERAL PHARMACY W/ HCPCS (ALT 636 FOR OP/ED): Performed by: INTERNAL MEDICINE

## 2023-10-02 PROCEDURE — 85025 COMPLETE CBC W/AUTO DIFF WBC: CPT | Performed by: HOSPITALIST

## 2023-10-02 RX ORDER — CARBAMAZEPINE 200 MG/1
200 TABLET ORAL 2 TIMES DAILY
COMMUNITY
Start: 2023-10-02

## 2023-10-02 RX ORDER — METOPROLOL SUCCINATE 25 MG/1
25 TABLET, EXTENDED RELEASE ORAL DAILY
COMMUNITY
Start: 2023-10-03

## 2023-10-02 RX ORDER — ALBUTEROL SULFATE 0.83 MG/ML
2.5 SOLUTION RESPIRATORY (INHALATION) EVERY 6 HOURS PRN
Qty: 75 ML | Refills: 11 | Status: SHIPPED | OUTPATIENT
Start: 2023-10-02

## 2023-10-02 RX ORDER — ATORVASTATIN CALCIUM 80 MG/1
80 TABLET, FILM COATED ORAL NIGHTLY
Status: ON HOLD | COMMUNITY
Start: 2023-10-02 | End: 2024-02-08 | Stop reason: ALTCHOICE

## 2023-10-02 RX ORDER — GABAPENTIN 600 MG/1
600 TABLET ORAL 3 TIMES DAILY
COMMUNITY
Start: 2023-10-02

## 2023-10-02 RX ORDER — BACLOFEN 10 MG/1
10 TABLET ORAL 2 TIMES DAILY
COMMUNITY
Start: 2023-10-02

## 2023-10-02 RX ORDER — ACETAMINOPHEN 325 MG/1
650 TABLET ORAL EVERY 4 HOURS PRN
Qty: 30 TABLET | Refills: 0 | Status: SHIPPED | OUTPATIENT
Start: 2023-10-02

## 2023-10-02 RX ORDER — METHIMAZOLE 5 MG/1
5 TABLET ORAL EVERY 8 HOURS SCHEDULED
COMMUNITY
Start: 2023-10-02

## 2023-10-02 RX ADMIN — METHIMAZOLE 5 MG: 5 TABLET ORAL at 05:41

## 2023-10-02 RX ADMIN — Medication 1 CAPSULE: at 15:04

## 2023-10-02 RX ADMIN — GABAPENTIN 600 MG: 300 CAPSULE ORAL at 15:04

## 2023-10-02 RX ADMIN — CARBAMAZEPINE 200 MG: 200 TABLET ORAL at 09:18

## 2023-10-02 RX ADMIN — GABAPENTIN 600 MG: 300 CAPSULE ORAL at 09:17

## 2023-10-02 RX ADMIN — POLYETHYLENE GLYCOL 3350 17 G: 17 POWDER, FOR SOLUTION ORAL at 09:18

## 2023-10-02 RX ADMIN — BACLOFEN 10 MG: 10 TABLET ORAL at 09:00

## 2023-10-02 RX ADMIN — METHIMAZOLE 5 MG: 5 TABLET ORAL at 13:31

## 2023-10-02 RX ADMIN — METOPROLOL SUCCINATE 25 MG: 25 TABLET, EXTENDED RELEASE ORAL at 09:18

## 2023-10-02 RX ADMIN — Medication 1 CAPSULE: at 09:18

## 2023-10-02 RX ADMIN — SODIUM CHLORIDE 75 ML/HR: 9 INJECTION, SOLUTION INTRAVENOUS at 05:54

## 2023-10-02 ASSESSMENT — PAIN - FUNCTIONAL ASSESSMENT
PAIN_FUNCTIONAL_ASSESSMENT: 0-10
PAIN_FUNCTIONAL_ASSESSMENT: 0-10

## 2023-10-02 ASSESSMENT — PAIN SCALES - GENERAL
PAINLEVEL_OUTOF10: 0 - NO PAIN
PAINLEVEL_OUTOF10: 0 - NO PAIN

## 2023-10-02 NOTE — DISCHARGE SUMMARY
Discharge Diagnosis  stage IV decubitus  coccygeal ulcer and stage II/III ulcerations at bilateral buttocks   neurogenic bladder status post chronic suprapubic Livingston catheter complicated by recurrent urinary tract infections (ESBL, Proteus mirabilis and a multidrug-resistant procidentia)   hyperthyroidism   hyponatremia.   advanced multiple sclerosis complicated by lower extremities paralysis/ upper extremities paresis     Issues Requiring Follow-Up  Wound clinic to follow-up for chronic decubitus wound stage III -IV    Discharge Meds     Your medication list        START taking these medications        Instructions Last Dose Given Next Dose Due   acetaminophen 325 mg tablet  Commonly known as: Tylenol      Take 2 tablets (650 mg) by mouth every 4 hours if needed for moderate pain (4 - 6).       albuterol 2.5 mg /3 mL (0.083 %) nebulizer solution      Take 3 mL (2.5 mg) by nebulization every 6 hours if needed for wheezing.       atorvastatin 80 mg tablet  Commonly known as: Lipitor           baclofen 10 mg tablet  Commonly known as: Lioresal           carBAMazepine 200 mg tablet  Commonly known as: TEGretol           gabapentin 300 mg capsule  Commonly known as: Neurontin           lactobacillus acidophilus capsule           methIMAzole 5 mg tablet  Commonly known as: Tapazole           metoprolol succinate XL 25 mg 24 hr tablet  Commonly known as: Toprol-XL  Start taking on: October 3, 2023                     Where to Get Your Medications        You can get these medications from any pharmacy    Bring a paper prescription for each of these medications  acetaminophen 325 mg tablet  albuterol 2.5 mg /3 mL (0.083 %) nebulizer solution         Test Results Pending At Discharge  Pending Labs       No current pending labs.            Hospital Course     Patient is a 63 year old Male Who was sent on 9/27/2023 from the nursing home to the emergency room for progressively worsening generalized weakness.  On presentation,   blood pressure 149/91, heart rate 103, respiratory rate 17, afebrile, saturation oxygen 95% on room air.  Pertinent findings on blood work-up; WBC 14,900, sodium 131, lactic acid 2.4 and TSH 0.01.  Urinalysis came back positive for leukocyte esterase  and bacteria.  Chest x-ray did not show any acute findings.  Patient was given in the emergency room 1 g IV ceftriaxone, methimazole 10 mg oral and IV fluids and then admitted to the medical service for further investigation and management.   During course of hospitalization patient was treated with IV meropenem for possible UTI from indwelling Livingston catheter, urine cultures came back negative.  He was also started on IV fluids.  Was also evaluated for chronic decubitus ulcer, CT shows no infection in the bone.  General surgery consulted, did not recommend further antibiotic use.  Patient was stopped on antibiotics, continue with the wound care management, postural changes to offload the weight on the wound.  Patient has chronic wound clinic follow-up at the nursing home which she will continue.  Has a history of hypothyroidism, free T4 elevated and low TSH while T3 levels to be stable.  He will follow-up with endocrine outside, continue with his home medications.  Had a low sodium, improved during hospitalization.  Has a history of advanced multiple sclerosis complicated by lower extremity paralysis/upper extremity paresis, stable.  Does use power wheelchair at the nursing home.  PT and OT seem to recommend continue rehab services.  Patient has been transferred back to the nursing home, follow-up with wound clinic and PCP.  Pertinent Physical Exam At Time of Discharge    Constitutional:  awake and alert and oriented  to person and place and time; no apparent distress respiratory distress progress mildly ill-appearing   Eyes: PERRL, EOMI, clear sclera   ENMT: mucous membranes moist, oropharynx  clear   Head/Neck: Normocephalic; neck supple, no  apparent injury, thyroid  without mass or tenderness, No JVD, trachea midline, no bruits   Respiratory/Thorax: There is some faint coarse  upper airway breath sounds; peripheral lung fields are clear otherwise   Cardiovascular: regular rate; regular rhythm;  normal S1-S2 with no murmur; trace edema and 1+ pulses bilaterally   Gastrointestinal: Soft, nontender, nondistended,  positive bowel sounds.  Catheter present.   Neurological: Nonfocal; cranial nerves II  through XII appear intact, extremity weakness.   Psychological: Somnolent; pleasant affect  otherwise   Skin: Decubitus ulcers to his buttocks.   There is also a coccygeal pressure ulcer that is covered by a dressing that is dry clean and intact      Outpatient Follow-Up  Wound care clinic.  PCP.    Total time spent 40 minutes.      Sarahi Deleon MD

## 2023-10-02 NOTE — DISCHARGE INSTR - OTHER ORDERS
Urinary Tract Infection Discharge Instructions, Adult    About this topic  A urinary tract infection is a UTI. It is caused by germs getting into the urinary tract. The urinary tract is made up of the kidneys, ureters, bladder, and urethra. The urethra is a tube at the bottom of the bladder. Urine flows out of this tube. The germs enter the urethra and then spread in the bladder. The ureters are small tubes that join the bladder and the kidneys. Most UTIs are infections in either your bladder or your kidneys. Bladder infections are more common and may also be called cystitis. Kidney infections are more serious and may also be called pyelonephritis.    What care is needed at home?  Ask your doctor what you need to do when you go home. Make sure you ask questions if you do not understand what the doctor says. This way you will know what you need to do.  Take your drugs as ordered by your doctor.  Unless your doctor has told you otherwise, drink at least 8 to 10 glasses of water or water-based drinks each day. Do not include drinks with caffeine, like coffee or tea.  Do not hold back your urine. Go to the bathroom every 2 to 3 hours.  What follow-up care is needed?  Your doctor may ask you to make visits to the office to check on your progress. Be sure to keep these visits.  What drugs may be needed?  The doctor may order drugs to:  Fight an infection  Help with pain  Numb your bladder  Help you pass your urine more easily  Be sure to talk to your doctor about all of your drugs if you are pregnant.  Will physical activity be limited?  Physical activities will not be limited. You may have to pass urine more often.  What changes to diet are needed?  Do not drink beer, wine, and mixed drinks (alcohol) or caffeine. These can bother the bladder.  Talk to your doctor about drinking cranberry juice.  What can be done to prevent this health problem?  Gently cleanse your genital area each day. Wipe from front to back to keep  germs from going in your body.  If your penis is uncircumcised, retract the foreskin and gently clean around the head of the penis each day.  Consider other methods of birth control instead of a spermicide.  Empty your bladder after having sex.  Empty your bladder before going to sleep.  When do I need to call the doctor?  Signs of infection. These include a fever of 100.4°F (38°C) or higher, chills, back pain, nausea, throwing up, or bloody urine.  Signs come back after treatment ends  You notice more blood in your urine.  Your signs get worse or do not improve within 24 hours of starting treatment.  You are not able to urinate for more than 8 hours.  Your signs come back after treatment has stopped.  Teach Back: Helping You Understand  The Teach Back Method helps you understand the information we are giving you. After you talk with the staff, tell them in your own words what you learned. This helps to make sure the staff has described each thing clearly. It also helps to explain things that may have been confusing. Before going home, make sure you can do these things:  I can tell you about my condition.  I can tell you how to prevent this problem from coming back.  I can tell you what I will do if my signs do not get better after 24 hours of treatment or come back after I have finished treatment.  Where can I learn more?  [employer]  https://familydoctor.org/condition/urinary-tract-infections/  [facility] Choices  https://www.nhs.uk/conditions/urinary-tract-infections-utis/  Last Reviewed Date  2021-06-03

## 2023-10-02 NOTE — NURSING NOTE
Discharge Note: 10/2/2023 1635 Discharged via stretcher by [facility] to SNF, paperwork packet sent with transporter, personal belongings taken by transporter, no distress noted, no complaints voiced. Cristel BENAVIDEZ

## 2023-10-02 NOTE — CARE PLAN
The patient's goals for the shift include  getting rest well through shift. Brief Changes as necessary    ~Last BM on 10/1 at 2100 hours -- Med, Brown, Loose   ~Wound Care Performed for Pressure Injuries   ~Patient declined 0200 hour turn as he was comfortable and did not feel he needed to be checked for stool incontinence.  ~Patient requested brief check with 6 AM meds -- Brief Clean/Dry    ~ 0600 hours Patient rested well throughout night, no c/o for Nursing at time of shift end. Patient expresses interest in returning to [facility]. RN identifies no obvious barriers to discharge from [facility] at this time.    The clinical goals for the shift include getting rest throughout shift -- patient achieved this goal well for shift.    Over the shift, the patient did not make progress toward the following goals. Barriers to progression include NONE. Recommendations to address these barriers include N/A.

## 2023-10-04 LAB
ANION GAP IN SER/PLAS: NORMAL
CALCIUM (MG/DL) IN SER/PLAS: NORMAL
CARBON DIOXIDE, TOTAL (MMOL/L) IN SER/PLAS: NORMAL
CHLORIDE (MMOL/L) IN SER/PLAS: NORMAL
CREATININE (MG/DL) IN SER/PLAS: NORMAL
ERYTHROCYTE DISTRIBUTION WIDTH (RATIO) BY AUTOMATED COUNT: NORMAL
ERYTHROCYTE MEAN CORPUSCULAR HEMOGLOBIN CONCENTRATION (G/DL) BY AUTOMATED: NORMAL
ERYTHROCYTE MEAN CORPUSCULAR VOLUME (FL) BY AUTOMATED COUNT: NORMAL
ERYTHROCYTES (10*6/UL) IN BLOOD BY AUTOMATED COUNT: NORMAL
GFR FEMALE: NORMAL
GFR MALE: NORMAL
GLOMERULAR FILTRATION RATE-AFRICAN AMERICAN: NORMAL ML/MIN/1.73M2
GLOMERULAR FILTRATION RATE-NON AFRICAN AMERICAN: NORMAL ML/MIN/1.73M2
GLUCOSE (MG/DL) IN SER/PLAS: NORMAL
HEMATOCRIT (%) IN BLOOD BY AUTOMATED COUNT: NORMAL
HEMOGLOBIN (G/DL) IN BLOOD: NORMAL
LEUKOCYTES (10*3/UL) IN BLOOD BY AUTOMATED COUNT: NORMAL
NRBC (PER 100 WBCS) BY AUTOMATED COUNT: NORMAL /100 WBC
PLATELETS (10*3/UL) IN BLOOD AUTOMATED COUNT: NORMAL
POTASSIUM (MMOL/L) IN SER/PLAS: NORMAL
SODIUM (MMOL/L) IN SER/PLAS: NORMAL
UREA NITROGEN (MG/DL) IN SER/PLAS: NORMAL

## 2023-11-29 ENCOUNTER — NURSING HOME VISIT (OUTPATIENT)
Dept: POST ACUTE CARE | Facility: EXTERNAL LOCATION | Age: 63
End: 2023-11-29
Payer: MEDICARE

## 2023-11-29 DIAGNOSIS — G35 MULTIPLE SCLEROSIS (MULTI): ICD-10-CM

## 2023-11-29 DIAGNOSIS — N31.9 NEUROGENIC BLADDER: Primary | ICD-10-CM

## 2023-11-29 PROCEDURE — 99308 SBSQ NF CARE LOW MDM 20: CPT | Performed by: FAMILY MEDICINE

## 2023-11-29 NOTE — LETTER
Patient: Raheem Hilton  : 1960    Encounter Date: 2023    A handwritten note was entered into the medical record at [facility] on this date.      Electronically Signed By: Luca Angeles MD   23 11:50 AM

## 2023-12-26 NOTE — PROGRESS NOTES
A handwritten note was entered into the medical record at [facility] on this date.

## 2023-12-26 NOTE — PROGRESS NOTES
A handwritten note was entered into the medical record at [facility] on this date.

## 2024-01-08 ENCOUNTER — APPOINTMENT (OUTPATIENT)
Dept: RADIOLOGY | Facility: HOSPITAL | Age: 64
DRG: 690 | End: 2024-01-08
Payer: MEDICARE

## 2024-01-08 ENCOUNTER — HOSPITAL ENCOUNTER (INPATIENT)
Facility: HOSPITAL | Age: 64
LOS: 1 days | Discharge: NURSING, REHAB OR LONG-TERM CARE | DRG: 690 | End: 2024-01-10
Attending: INTERNAL MEDICINE | Admitting: INTERNAL MEDICINE
Payer: MEDICARE

## 2024-01-08 DIAGNOSIS — A41.9 SEPSIS, DUE TO UNSPECIFIED ORGANISM, UNSPECIFIED WHETHER ACUTE ORGAN DYSFUNCTION PRESENT (MULTI): Primary | ICD-10-CM

## 2024-01-08 DIAGNOSIS — T83.511D URINARY TRACT INFECTION ASSOCIATED WITH INDWELLING URETHRAL CATHETER, SUBSEQUENT ENCOUNTER: ICD-10-CM

## 2024-01-08 DIAGNOSIS — N39.0 URINARY TRACT INFECTION ASSOCIATED WITH CATHETERIZATION OF URINARY TRACT, UNSPECIFIED INDWELLING URINARY CATHETER TYPE, INITIAL ENCOUNTER (CMS-HCC): ICD-10-CM

## 2024-01-08 DIAGNOSIS — T83.511A URINARY TRACT INFECTION ASSOCIATED WITH CATHETERIZATION OF URINARY TRACT, UNSPECIFIED INDWELLING URINARY CATHETER TYPE, INITIAL ENCOUNTER (CMS-HCC): ICD-10-CM

## 2024-01-08 DIAGNOSIS — N39.0 URINARY TRACT INFECTION ASSOCIATED WITH INDWELLING URETHRAL CATHETER, SUBSEQUENT ENCOUNTER: ICD-10-CM

## 2024-01-08 LAB
ALBUMIN SERPL BCP-MCNC: 4.5 G/DL (ref 3.4–5)
ALP SERPL-CCNC: 146 U/L (ref 33–136)
ALT SERPL W P-5'-P-CCNC: 33 U/L (ref 10–52)
ANION GAP SERPL CALC-SCNC: 14 MMOL/L (ref 10–20)
APPEARANCE UR: ABNORMAL
AST SERPL W P-5'-P-CCNC: 25 U/L (ref 9–39)
BACTERIA #/AREA URNS AUTO: ABNORMAL /HPF
BASOPHILS # BLD AUTO: 0.08 X10*3/UL (ref 0–0.1)
BASOPHILS NFR BLD AUTO: 0.3 %
BILIRUB SERPL-MCNC: 0.4 MG/DL (ref 0–1.2)
BILIRUB UR STRIP.AUTO-MCNC: NEGATIVE MG/DL
BUN SERPL-MCNC: 19 MG/DL (ref 6–23)
CALCIUM SERPL-MCNC: 9.4 MG/DL (ref 8.6–10.3)
CHLORIDE SERPL-SCNC: 95 MMOL/L (ref 98–107)
CO2 SERPL-SCNC: 27 MMOL/L (ref 21–32)
COLOR UR: YELLOW
CREAT SERPL-MCNC: 0.53 MG/DL (ref 0.5–1.3)
EGFRCR SERPLBLD CKD-EPI 2021: >90 ML/MIN/1.73M*2
EOSINOPHIL # BLD AUTO: 0.36 X10*3/UL (ref 0–0.7)
EOSINOPHIL NFR BLD AUTO: 1.4 %
ERYTHROCYTE [DISTWIDTH] IN BLOOD BY AUTOMATED COUNT: 13.4 % (ref 11.5–14.5)
GLUCOSE SERPL-MCNC: 88 MG/DL (ref 74–99)
GLUCOSE UR STRIP.AUTO-MCNC: NEGATIVE MG/DL
HCT VFR BLD AUTO: 47.9 % (ref 41–52)
HGB BLD-MCNC: 15.7 G/DL (ref 13.5–17.5)
HOLD SPECIMEN: NORMAL
HOLD SPECIMEN: NORMAL
IMM GRANULOCYTES # BLD AUTO: 0.12 X10*3/UL (ref 0–0.7)
IMM GRANULOCYTES NFR BLD AUTO: 0.5 % (ref 0–0.9)
KETONES UR STRIP.AUTO-MCNC: NEGATIVE MG/DL
LACTATE SERPL-SCNC: 2 MMOL/L (ref 0.4–2)
LACTATE SERPL-SCNC: 3 MMOL/L (ref 0.4–2)
LEUKOCYTE ESTERASE UR QL STRIP.AUTO: ABNORMAL
LYMPHOCYTES # BLD AUTO: 1.32 X10*3/UL (ref 1.2–4.8)
LYMPHOCYTES NFR BLD AUTO: 5.3 %
MCH RBC QN AUTO: 30.2 PG (ref 26–34)
MCHC RBC AUTO-ENTMCNC: 32.8 G/DL (ref 32–36)
MCV RBC AUTO: 92 FL (ref 80–100)
MONOCYTES # BLD AUTO: 1.69 X10*3/UL (ref 0.1–1)
MONOCYTES NFR BLD AUTO: 6.8 %
MUCOUS THREADS #/AREA URNS AUTO: ABNORMAL /LPF
NEUTROPHILS # BLD AUTO: 21.44 X10*3/UL (ref 1.2–7.7)
NEUTROPHILS NFR BLD AUTO: 85.7 %
NITRITE UR QL STRIP.AUTO: NEGATIVE
NRBC BLD-RTO: 0 /100 WBCS (ref 0–0)
PH UR STRIP.AUTO: 7 [PH]
PLATELET # BLD AUTO: 335 X10*3/UL (ref 150–450)
POTASSIUM SERPL-SCNC: 4.1 MMOL/L (ref 3.5–5.3)
PROT SERPL-MCNC: 8.1 G/DL (ref 6.4–8.2)
PROT UR STRIP.AUTO-MCNC: ABNORMAL MG/DL
RBC # BLD AUTO: 5.2 X10*6/UL (ref 4.5–5.9)
RBC # UR STRIP.AUTO: NEGATIVE /UL
RBC #/AREA URNS AUTO: ABNORMAL /HPF
SODIUM SERPL-SCNC: 132 MMOL/L (ref 136–145)
SP GR UR STRIP.AUTO: 1.02
UROBILINOGEN UR STRIP.AUTO-MCNC: <2 MG/DL
WBC # BLD AUTO: 25 X10*3/UL (ref 4.4–11.3)
WBC #/AREA URNS AUTO: ABNORMAL /HPF

## 2024-01-08 PROCEDURE — 2500000004 HC RX 250 GENERAL PHARMACY W/ HCPCS (ALT 636 FOR OP/ED): Performed by: NURSE PRACTITIONER

## 2024-01-08 PROCEDURE — 85025 COMPLETE CBC W/AUTO DIFF WBC: CPT | Performed by: NURSE PRACTITIONER

## 2024-01-08 PROCEDURE — 87086 URINE CULTURE/COLONY COUNT: CPT | Mod: SAMLAB | Performed by: NURSE PRACTITIONER

## 2024-01-08 PROCEDURE — 93005 ELECTROCARDIOGRAM TRACING: CPT

## 2024-01-08 PROCEDURE — 74177 CT ABD & PELVIS W/CONTRAST: CPT | Mod: FR

## 2024-01-08 PROCEDURE — 71045 X-RAY EXAM CHEST 1 VIEW: CPT | Mod: FR

## 2024-01-08 PROCEDURE — 96365 THER/PROPH/DIAG IV INF INIT: CPT

## 2024-01-08 PROCEDURE — 81001 URINALYSIS AUTO W/SCOPE: CPT | Performed by: NURSE PRACTITIONER

## 2024-01-08 PROCEDURE — 83605 ASSAY OF LACTIC ACID: CPT | Performed by: NURSE PRACTITIONER

## 2024-01-08 PROCEDURE — 99223 1ST HOSP IP/OBS HIGH 75: CPT | Performed by: INTERNAL MEDICINE

## 2024-01-08 PROCEDURE — 96361 HYDRATE IV INFUSION ADD-ON: CPT

## 2024-01-08 PROCEDURE — 84443 ASSAY THYROID STIM HORMONE: CPT | Performed by: INTERNAL MEDICINE

## 2024-01-08 PROCEDURE — 99285 EMERGENCY DEPT VISIT HI MDM: CPT

## 2024-01-08 PROCEDURE — 2550000001 HC RX 255 CONTRASTS: Performed by: EMERGENCY MEDICINE

## 2024-01-08 PROCEDURE — 74177 CT ABD & PELVIS W/CONTRAST: CPT | Mod: FOREIGN READ | Performed by: RADIOLOGY

## 2024-01-08 PROCEDURE — 36415 COLL VENOUS BLD VENIPUNCTURE: CPT | Performed by: NURSE PRACTITIONER

## 2024-01-08 PROCEDURE — 87040 BLOOD CULTURE FOR BACTERIA: CPT | Mod: SAMLAB | Performed by: NURSE PRACTITIONER

## 2024-01-08 PROCEDURE — 80053 COMPREHEN METABOLIC PANEL: CPT | Performed by: NURSE PRACTITIONER

## 2024-01-08 PROCEDURE — 71045 X-RAY EXAM CHEST 1 VIEW: CPT | Mod: FOREIGN READ | Performed by: RADIOLOGY

## 2024-01-08 RX ORDER — CEFTRIAXONE 1 G/50ML
1 INJECTION, SOLUTION INTRAVENOUS EVERY 24 HOURS
Status: DISCONTINUED | OUTPATIENT
Start: 2024-01-08 | End: 2024-01-08

## 2024-01-08 RX ORDER — CEFTRIAXONE 2 G/50ML
2 INJECTION, SOLUTION INTRAVENOUS ONCE
Status: COMPLETED | OUTPATIENT
Start: 2024-01-08 | End: 2024-01-08

## 2024-01-08 RX ORDER — SODIUM CHLORIDE 9 MG/ML
125 INJECTION, SOLUTION INTRAVENOUS CONTINUOUS
Status: DISCONTINUED | OUTPATIENT
Start: 2024-01-08 | End: 2024-01-10 | Stop reason: HOSPADM

## 2024-01-08 RX ORDER — CEFTRIAXONE 1 G/50ML
1 INJECTION, SOLUTION INTRAVENOUS EVERY 24 HOURS
Status: DISCONTINUED | OUTPATIENT
Start: 2024-01-09 | End: 2024-01-10

## 2024-01-08 RX ADMIN — CEFTRIAXONE SODIUM 2 G: 2 INJECTION, SOLUTION INTRAVENOUS at 19:45

## 2024-01-08 RX ADMIN — SODIUM CHLORIDE 1000 ML: 9 INJECTION, SOLUTION INTRAVENOUS at 18:06

## 2024-01-08 RX ADMIN — SODIUM CHLORIDE 500 ML: 9 INJECTION, SOLUTION INTRAVENOUS at 21:25

## 2024-01-08 RX ADMIN — SODIUM CHLORIDE 125 ML/HR: 9 INJECTION, SOLUTION INTRAVENOUS at 21:25

## 2024-01-08 RX ADMIN — IOHEXOL 67 ML: 350 INJECTION, SOLUTION INTRAVENOUS at 18:47

## 2024-01-08 ASSESSMENT — COLUMBIA-SUICIDE SEVERITY RATING SCALE - C-SSRS
6. HAVE YOU EVER DONE ANYTHING, STARTED TO DO ANYTHING, OR PREPARED TO DO ANYTHING TO END YOUR LIFE?: NO
2. HAVE YOU ACTUALLY HAD ANY THOUGHTS OF KILLING YOURSELF?: NO
1. IN THE PAST MONTH, HAVE YOU WISHED YOU WERE DEAD OR WISHED YOU COULD GO TO SLEEP AND NOT WAKE UP?: NO

## 2024-01-08 ASSESSMENT — ENCOUNTER SYMPTOMS
PSYCHIATRIC NEGATIVE: 1
WEAKNESS: 1
ENDOCRINE NEGATIVE: 1
ABDOMINAL PAIN: 1
RESPIRATORY NEGATIVE: 1
EYES NEGATIVE: 1
NUMBNESS: 1
CONSTITUTIONAL NEGATIVE: 1
HEMATOLOGIC/LYMPHATIC NEGATIVE: 1
MUSCULOSKELETAL NEGATIVE: 1
CARDIOVASCULAR NEGATIVE: 1
ALLERGIC/IMMUNOLOGIC NEGATIVE: 1

## 2024-01-08 ASSESSMENT — PAIN SCALES - GENERAL: PAINLEVEL_OUTOF10: 0 - NO PAIN

## 2024-01-08 NOTE — ED PROVIDER NOTES
Chief Complaint   Patient presents with    Blood Infection     Brought to ED per PERLITA sainz from [place]. Pt reports that he knows he has a UTI and becomes septic very quickly. Has chronic catheter. EKG done for tachycardia         Patient History    Past Medical History:   Diagnosis Date    Anemia     Bowel perforation (CMS/HCC)     Buttock wound, unspecified laterality, subsequent encounter     COPD (chronic obstructive pulmonary disease) (CMS/HCC)     Extended spectrum beta lactamase (ESBL) resistance     GERD (gastroesophageal reflux disease)     Hyperlipidemia     Hypertension     Hypo-osmolality and hyponatremia     Insomnia     Multiple sclerosis (CMS/HCC)     Neurogenic bladder     Neuromuscular dysfunction of bladder     Personal history of transient ischemic attack (TIA), and cerebral infarction without residual deficits 08/06/2020    History of embolic stroke    Pulmonary embolism (CMS/HCC)     Sepsis with encephalopathy (CMS/HCC)     Septic shock (CMS/HCC)     Simple febrile convulsions (CMS/HCC)     Status post administration of tPA (rtPA) in a different facility within the last 24 hours prior to admission to current facility 08/06/2020    Tissue plasminogen activator (tPA) administered at other facility within 24 hours before current admission    UTI (urinary tract infection)       Past Surgical History:   Procedure Laterality Date    MR HEAD ANGIO WO IV CONTRAST  6/29/2020    MR HEAD ANGIO WO IV CONTRAST 6/29/2020 [facility]    MR NECK ANGIO WO IV CONTRAST  6/29/2020    MR NECK ANGIO WO IV CONTRAST 6/29/2020 [facility]ICAL [facility]GAC[facility]      No family history on file.   Social History     Social History Narrative    Not on file      Allergies   Allergen Reactions    Bactrim [Sulfamethoxazole-Trimethoprim] Unknown    Cefepime Unknown    Doxycycline Unknown    Heparin Unknown        PMH: Reviewed  PSH: Reviewed  Social History: Reviewed.   Allergies reviewed.     HPI: Raheem Hilton is a 63 y.o. male  who presents to the ED today unaccompanied via EMS from [facility] with complaints of concern for UTI. Has history of neurogenic bladder, has a suprapubic catheter in.  States he gets urinary tract infections and sepsis frequently and feels that that is what is happening now.  States his hands are numb and that is what happens.  Denies fevers.  States his belly hurts, feels like he is backing up.  Noted to be tachycardic on ED arrival.  EKG obtained.      REVIEW OF SYSTEMS:  All other systems reviewed and negative except as listed in HPI.    PHYSICAL EXAM:    GENERAL: Vitals noted, no distress. Alert and oriented x 3. Non-toxic.      EENT: TMs clear. Posterior oropharynx unremarkable. EOMI, no nystagmus noted.     NECK: Supple. No masses. No midline tenderness. No meningeal signs.     CARDIAC: Regular rhythm, tachycardic. No murmurs rubs or gallops. No JVD.    PULMONARY: Lungs clear and equal bilaterally. No wheezes rales or rhonchi. No respiratory distress.     ABDOMEN: Soft, nondistended, and diffusely tender. No peritoneal signs. Bowel sounds are present and normoactive in all 4 quadrants. No pulsatile masses.  Suprapubic catheter in place, draining clear yellow urine with sediment.    EXTREMITIES: No peripheral edema.     SKIN: No rash. Warm, dry, and intact.     NEURO: No focal neurologic deficits.     Labs Reviewed   CBC WITH AUTO DIFFERENTIAL - Abnormal       Result Value    WBC 25.0 (*)     nRBC 0.0      RBC 5.20      Hemoglobin 15.7      Hematocrit 47.9      MCV 92      MCH 30.2      MCHC 32.8      RDW 13.4      Platelets 335      Neutrophils % 85.7      Immature Granulocytes %, Automated 0.5      Lymphocytes % 5.3      Monocytes % 6.8      Eosinophils % 1.4      Basophils % 0.3      Neutrophils Absolute 21.44 (*)     Immature Granulocytes Absolute, Automated 0.12      Lymphocytes Absolute 1.32      Monocytes Absolute 1.69 (*)     Eosinophils Absolute 0.36      Basophils Absolute 0.08     COMPREHENSIVE METABOLIC  PANEL - Abnormal    Glucose 88      Sodium 132 (*)     Potassium 4.1      Chloride 95 (*)     Bicarbonate 27      Anion Gap 14      Urea Nitrogen 19      Creatinine 0.53      eGFR >90      Calcium 9.4      Albumin 4.5      Alkaline Phosphatase 146 (*)     Total Protein 8.1      AST 25      Bilirubin, Total 0.4      ALT 33     LACTATE - Abnormal    Lactate 3.0 (*)     Narrative:     Venipuncture immediately after or during the administration of Metamizole may lead to falsely low results. Testing should be performed immediately  prior to Metamizole dosing.   URINALYSIS WITH REFLEX CULTURE AND MICROSCOPIC - Abnormal    Color, Urine Yellow      Appearance, Urine Hazy (*)     Specific Gravity, Urine 1.016      pH, Urine 7.0      Protein, Urine 100 (2+) (*)     Glucose, Urine NEGATIVE      Blood, Urine NEGATIVE      Ketones, Urine NEGATIVE      Bilirubin, Urine NEGATIVE      Urobilinogen, Urine <2.0      Nitrite, Urine NEGATIVE      Leukocyte Esterase, Urine LARGE (3+) (*)    MICROSCOPIC ONLY, URINE - Abnormal    WBC, Urine 21-50 (*)     RBC, Urine 11-20 (*)     Bacteria, Urine 1+ (*)     Mucus, Urine 1+     LACTATE - Normal    Lactate 2.0      Narrative:     Venipuncture immediately after or during the administration of Metamizole may lead to falsely low results. Testing should be performed immediately  prior to Metamizole dosing.   BLOOD CULTURE   BLOOD CULTURE   URINE CULTURE   URINALYSIS WITH REFLEX CULTURE AND MICROSCOPIC    Narrative:     The following orders were created for panel order Urinalysis with Reflex Culture and Microscopic.  Procedure                               Abnormality         Status                     ---------                               -----------         ------                     Urinalysis with Reflex C...[[id number]]  Abnormal            Final result               Extra Urine Gray Tube[[id number]]                                                         Please view results for these tests  on the individual orders.   EXTRA URINE GRAY TUBE        CT abdomen pelvis w IV contrast   Final Result   The colon is mildly constipated but there is much more significant    constipation in the distal sigmoid colon and rectum.  No other bowel   abnormalities are appreciated.   Midline decubitus ulceration with chronic osteomyelitis in the lower   sacrum and coccyx remains without evidence of a soft tissue abscess.    This is not changed from the prior CT..   Signed by Dayton Ortega MD      XR chest 1 view   Final Result   Clear chest. Moderate air distention bowel, interposed beneath both   hemidiaphragms. Remainder as above.   Signed by Farshad Jones MD           Medical Decision Making         ED COURSE: This patient was seen and examined by myself and Dr. Hagen. He is placed on a continuous cardiac monitor with pulse oximetry monitoring. Old records and EKGs are obtained and reviewed. IV heplock is established, labs are obtained and noted above. Sepsis protocol utilized given history of urosepsis. Given NS 1500ml bolus total followed by NS at 125ml/hr. Urine concerning for UTI and this is treated with IV rocpehin; he's tolerated this well in the past. CT abd/pelv shows constipation, unchanged decubitus ulcer with chronic osteomyelitis. Discussed with hospitalist, Dr Bowman, accepted for admission.              Differential Diagnoses Considered: urosepsis, uti, pyelonephritis    Chronic Medical Conditions Significantly Affecting Care: see above    External Records Reviewed: I reviewed recent and relevant outside records including: PCP notes, prior discharge summary, previous radiologic studies    Diagnostic testing considered: blood, urine, culture, ct abd/pelv    Escalation of Care: Appropriate for inpatient management    Discussion of Management with Other Providers: I discussed the patient/results with: admitting team        DIAGNOSTIC IMPRESSION: #1 urosepsis      Tamra Romero, SANJUANA-DEANNA  01/08/24  [id number]

## 2024-01-09 ENCOUNTER — APPOINTMENT (OUTPATIENT)
Dept: CARDIOLOGY | Facility: HOSPITAL | Age: 64
DRG: 690 | End: 2024-01-09
Payer: MEDICARE

## 2024-01-09 LAB
ANION GAP SERPL CALC-SCNC: 13 MMOL/L (ref 10–20)
BACTERIA UR CULT: ABNORMAL
BUN SERPL-MCNC: 16 MG/DL (ref 6–23)
CALCIUM SERPL-MCNC: 8.2 MG/DL (ref 8.6–10.3)
CHLORIDE SERPL-SCNC: 104 MMOL/L (ref 98–107)
CO2 SERPL-SCNC: 21 MMOL/L (ref 21–32)
CREAT SERPL-MCNC: 0.49 MG/DL (ref 0.5–1.3)
EGFRCR SERPLBLD CKD-EPI 2021: >90 ML/MIN/1.73M*2
ERYTHROCYTE [DISTWIDTH] IN BLOOD BY AUTOMATED COUNT: 14 % (ref 11.5–14.5)
GLUCOSE SERPL-MCNC: 103 MG/DL (ref 74–99)
HCT VFR BLD AUTO: 44 % (ref 41–52)
HGB BLD-MCNC: 14.6 G/DL (ref 13.5–17.5)
MCH RBC QN AUTO: 30.2 PG (ref 26–34)
MCHC RBC AUTO-ENTMCNC: 33.2 G/DL (ref 32–36)
MCV RBC AUTO: 91 FL (ref 80–100)
NRBC BLD-RTO: 0 /100 WBCS (ref 0–0)
PLATELET # BLD AUTO: 267 X10*3/UL (ref 150–450)
POTASSIUM SERPL-SCNC: 3.1 MMOL/L (ref 3.5–5.3)
RBC # BLD AUTO: 4.83 X10*6/UL (ref 4.5–5.9)
SODIUM SERPL-SCNC: 135 MMOL/L (ref 136–145)
TSH SERPL-ACNC: 1.15 MIU/L (ref 0.44–3.98)
WBC # BLD AUTO: 23.9 X10*3/UL (ref 4.4–11.3)

## 2024-01-09 PROCEDURE — 1100000001 HC PRIVATE ROOM DAILY

## 2024-01-09 PROCEDURE — 94762 N-INVAS EAR/PLS OXIMTRY CONT: CPT

## 2024-01-09 PROCEDURE — 2500000004 HC RX 250 GENERAL PHARMACY W/ HCPCS (ALT 636 FOR OP/ED): Performed by: NURSE PRACTITIONER

## 2024-01-09 PROCEDURE — 93005 ELECTROCARDIOGRAM TRACING: CPT

## 2024-01-09 PROCEDURE — 2500000004 HC RX 250 GENERAL PHARMACY W/ HCPCS (ALT 636 FOR OP/ED): Performed by: INTERNAL MEDICINE

## 2024-01-09 PROCEDURE — 2500000001 HC RX 250 WO HCPCS SELF ADMINISTERED DRUGS (ALT 637 FOR MEDICARE OP): Performed by: INTERNAL MEDICINE

## 2024-01-09 PROCEDURE — 36415 COLL VENOUS BLD VENIPUNCTURE: CPT | Performed by: NURSE PRACTITIONER

## 2024-01-09 PROCEDURE — 99223 1ST HOSP IP/OBS HIGH 75: CPT | Performed by: NURSE PRACTITIONER

## 2024-01-09 PROCEDURE — 94664 DEMO&/EVAL PT USE INHALER: CPT

## 2024-01-09 PROCEDURE — 85027 COMPLETE CBC AUTOMATED: CPT | Performed by: NURSE PRACTITIONER

## 2024-01-09 PROCEDURE — 9420000001 HC RT PATIENT EDUCATION 5 MIN

## 2024-01-09 PROCEDURE — 2500000001 HC RX 250 WO HCPCS SELF ADMINISTERED DRUGS (ALT 637 FOR MEDICARE OP): Performed by: NURSE PRACTITIONER

## 2024-01-09 PROCEDURE — 82374 ASSAY BLOOD CARBON DIOXIDE: CPT | Performed by: NURSE PRACTITIONER

## 2024-01-09 RX ORDER — HYDROCODONE BITARTRATE AND ACETAMINOPHEN 5; 325 MG/1; MG/1
1 TABLET ORAL EVERY 12 HOURS PRN
Status: DISCONTINUED | OUTPATIENT
Start: 2024-01-09 | End: 2024-01-10 | Stop reason: HOSPADM

## 2024-01-09 RX ORDER — GABAPENTIN 300 MG/1
600 CAPSULE ORAL 3 TIMES DAILY
Status: DISCONTINUED | OUTPATIENT
Start: 2024-01-09 | End: 2024-01-10 | Stop reason: HOSPADM

## 2024-01-09 RX ORDER — ACETAMINOPHEN 325 MG/1
650 TABLET ORAL EVERY 4 HOURS PRN
Status: DISCONTINUED | OUTPATIENT
Start: 2024-01-09 | End: 2024-01-10 | Stop reason: HOSPADM

## 2024-01-09 RX ORDER — METHIMAZOLE 5 MG/1
5 TABLET ORAL EVERY 8 HOURS SCHEDULED
Status: DISCONTINUED | OUTPATIENT
Start: 2024-01-09 | End: 2024-01-10 | Stop reason: HOSPADM

## 2024-01-09 RX ORDER — BACLOFEN 10 MG/1
10 TABLET ORAL 2 TIMES DAILY
Status: DISCONTINUED | OUTPATIENT
Start: 2024-01-09 | End: 2024-01-10 | Stop reason: HOSPADM

## 2024-01-09 RX ORDER — ACETAMINOPHEN 160 MG/5ML
650 SOLUTION ORAL EVERY 4 HOURS PRN
Status: DISCONTINUED | OUTPATIENT
Start: 2024-01-09 | End: 2024-01-10 | Stop reason: HOSPADM

## 2024-01-09 RX ORDER — POTASSIUM CHLORIDE 20 MEQ/1
40 TABLET, EXTENDED RELEASE ORAL ONCE
Status: COMPLETED | OUTPATIENT
Start: 2024-01-09 | End: 2024-01-09

## 2024-01-09 RX ORDER — POLYETHYLENE GLYCOL 3350 17 G/17G
17 POWDER, FOR SOLUTION ORAL DAILY
Status: DISCONTINUED | OUTPATIENT
Start: 2024-01-09 | End: 2024-01-10 | Stop reason: HOSPADM

## 2024-01-09 RX ORDER — METOPROLOL SUCCINATE 25 MG/1
25 TABLET, EXTENDED RELEASE ORAL DAILY
Status: DISCONTINUED | OUTPATIENT
Start: 2024-01-09 | End: 2024-01-10 | Stop reason: HOSPADM

## 2024-01-09 RX ORDER — ALBUTEROL SULFATE 0.83 MG/ML
2.5 SOLUTION RESPIRATORY (INHALATION) EVERY 6 HOURS PRN
Status: DISCONTINUED | OUTPATIENT
Start: 2024-01-09 | End: 2024-01-10 | Stop reason: HOSPADM

## 2024-01-09 RX ORDER — ACETAMINOPHEN 650 MG/1
650 SUPPOSITORY RECTAL EVERY 4 HOURS PRN
Status: DISCONTINUED | OUTPATIENT
Start: 2024-01-09 | End: 2024-01-10 | Stop reason: HOSPADM

## 2024-01-09 RX ORDER — ENOXAPARIN SODIUM 100 MG/ML
40 INJECTION SUBCUTANEOUS EVERY 24 HOURS
Status: DISCONTINUED | OUTPATIENT
Start: 2024-01-09 | End: 2024-01-10 | Stop reason: HOSPADM

## 2024-01-09 RX ORDER — CARBAMAZEPINE 200 MG/1
200 TABLET ORAL 2 TIMES DAILY
Status: DISCONTINUED | OUTPATIENT
Start: 2024-01-09 | End: 2024-01-10 | Stop reason: HOSPADM

## 2024-01-09 RX ORDER — ATORVASTATIN CALCIUM 80 MG/1
80 TABLET, FILM COATED ORAL NIGHTLY
Status: DISCONTINUED | OUTPATIENT
Start: 2024-01-09 | End: 2024-01-10 | Stop reason: HOSPADM

## 2024-01-09 RX ORDER — SENNOSIDES 8.6 MG/1
2 TABLET ORAL 2 TIMES DAILY
Status: DISCONTINUED | OUTPATIENT
Start: 2024-01-09 | End: 2024-01-10 | Stop reason: HOSPADM

## 2024-01-09 RX ADMIN — BACLOFEN 10 MG: 10 TABLET ORAL at 10:22

## 2024-01-09 RX ADMIN — SODIUM CHLORIDE 125 ML/HR: 9 INJECTION, SOLUTION INTRAVENOUS at 21:30

## 2024-01-09 RX ADMIN — GABAPENTIN 600 MG: 300 CAPSULE ORAL at 16:09

## 2024-01-09 RX ADMIN — POTASSIUM CHLORIDE 40 MEQ: 1500 TABLET, EXTENDED RELEASE ORAL at 12:43

## 2024-01-09 RX ADMIN — HYDROCODONE BITARTRATE AND ACETAMINOPHEN 1 TABLET: 5; 325 TABLET ORAL at 16:07

## 2024-01-09 RX ADMIN — METHIMAZOLE 5 MG: 5 TABLET ORAL at 20:20

## 2024-01-09 RX ADMIN — BACLOFEN 10 MG: 10 TABLET ORAL at 20:19

## 2024-01-09 RX ADMIN — METHIMAZOLE 5 MG: 5 TABLET ORAL at 16:08

## 2024-01-09 RX ADMIN — ENOXAPARIN SODIUM 40 MG: 40 INJECTION SUBCUTANEOUS at 10:21

## 2024-01-09 RX ADMIN — SODIUM CHLORIDE 125 ML/HR: 9 INJECTION, SOLUTION INTRAVENOUS at 01:18

## 2024-01-09 RX ADMIN — Medication 1 CAPSULE: at 20:19

## 2024-01-09 RX ADMIN — CEFTRIAXONE SODIUM 1 G: 1 INJECTION, SOLUTION INTRAVENOUS at 23:30

## 2024-01-09 RX ADMIN — Medication 1 CAPSULE: at 10:22

## 2024-01-09 RX ADMIN — Medication 1 CAPSULE: at 16:09

## 2024-01-09 RX ADMIN — SENNOSIDES 17.2 MG: 8.6 TABLET ORAL at 20:18

## 2024-01-09 RX ADMIN — METOPROLOL SUCCINATE 25 MG: 25 TABLET, EXTENDED RELEASE ORAL at 10:22

## 2024-01-09 RX ADMIN — METHIMAZOLE 5 MG: 5 TABLET ORAL at 10:23

## 2024-01-09 RX ADMIN — CARBAMAZEPINE 200 MG: 200 TABLET ORAL at 20:19

## 2024-01-09 RX ADMIN — GABAPENTIN 600 MG: 300 CAPSULE ORAL at 10:23

## 2024-01-09 RX ADMIN — GABAPENTIN 600 MG: 300 CAPSULE ORAL at 20:19

## 2024-01-09 RX ADMIN — ATORVASTATIN CALCIUM 80 MG: 80 TABLET, FILM COATED ORAL at 20:18

## 2024-01-09 RX ADMIN — CARBAMAZEPINE 200 MG: 200 TABLET ORAL at 10:22

## 2024-01-09 SDOH — SOCIAL STABILITY: SOCIAL INSECURITY: ABUSE: ADULT

## 2024-01-09 SDOH — SOCIAL STABILITY: SOCIAL INSECURITY: WERE YOU ABLE TO COMPLETE ALL THE BEHAVIORAL HEALTH SCREENINGS?: YES

## 2024-01-09 SDOH — SOCIAL STABILITY: SOCIAL INSECURITY: ARE THERE ANY APPARENT SIGNS OF INJURIES/BEHAVIORS THAT COULD BE RELATED TO ABUSE/NEGLECT?: NO

## 2024-01-09 SDOH — SOCIAL STABILITY: SOCIAL INSECURITY: DO YOU FEEL UNSAFE GOING BACK TO THE PLACE WHERE YOU ARE LIVING?: NO

## 2024-01-09 SDOH — SOCIAL STABILITY: SOCIAL INSECURITY: HAS ANYONE EVER THREATENED TO HURT YOUR FAMILY OR YOUR PETS?: NO

## 2024-01-09 SDOH — SOCIAL STABILITY: SOCIAL INSECURITY: DOES ANYONE TRY TO KEEP YOU FROM HAVING/CONTACTING OTHER FRIENDS OR DOING THINGS OUTSIDE YOUR HOME?: NO

## 2024-01-09 SDOH — SOCIAL STABILITY: SOCIAL INSECURITY: HAVE YOU HAD THOUGHTS OF HARMING ANYONE ELSE?: NO

## 2024-01-09 SDOH — SOCIAL STABILITY: SOCIAL INSECURITY: ARE YOU OR HAVE YOU BEEN THREATENED OR ABUSED PHYSICALLY, EMOTIONALLY, OR SEXUALLY BY ANYONE?: NO

## 2024-01-09 SDOH — SOCIAL STABILITY: SOCIAL INSECURITY: DO YOU FEEL ANYONE HAS EXPLOITED OR TAKEN ADVANTAGE OF YOU FINANCIALLY OR OF YOUR PERSONAL PROPERTY?: NO

## 2024-01-09 ASSESSMENT — ACTIVITIES OF DAILY LIVING (ADL)
WALKS IN HOME: DEPENDENT
PATIENT'S MEMORY ADEQUATE TO SAFELY COMPLETE DAILY ACTIVITIES?: YES
TOILETING: DEPENDENT
GROOMING: NEEDS ASSISTANCE
BATHING: DEPENDENT
HEARING - LEFT EAR: FUNCTIONAL
JUDGMENT_ADEQUATE_SAFELY_COMPLETE_DAILY_ACTIVITIES: YES
GROOMING: NEEDS ASSISTANCE
DRESSING YOURSELF: NEEDS ASSISTANCE
TOILETING: DEPENDENT
WALKS IN HOME: DEPENDENT
LACK_OF_TRANSPORTATION: PATIENT DECLINED
HEARING - RIGHT EAR: FUNCTIONAL
FEEDING YOURSELF: NEEDS ASSISTANCE
HEARING - RIGHT EAR: FUNCTIONAL
PATIENT'S MEMORY ADEQUATE TO SAFELY COMPLETE DAILY ACTIVITIES?: YES
BATHING: DEPENDENT
JUDGMENT_ADEQUATE_SAFELY_COMPLETE_DAILY_ACTIVITIES: YES
ADEQUATE_TO_COMPLETE_ADL: YES
HEARING - LEFT EAR: FUNCTIONAL
DRESSING YOURSELF: NEEDS ASSISTANCE
FEEDING YOURSELF: NEEDS ASSISTANCE
ADEQUATE_TO_COMPLETE_ADL: YES

## 2024-01-09 ASSESSMENT — PAIN SCALES - GENERAL
PAINLEVEL_OUTOF10: 5 - MODERATE PAIN
PAINLEVEL_OUTOF10: 0 - NO PAIN
PAINLEVEL_OUTOF10: 0 - NO PAIN

## 2024-01-09 ASSESSMENT — LIFESTYLE VARIABLES
HOW OFTEN DO YOU HAVE A DRINK CONTAINING ALCOHOL: NEVER
SKIP TO QUESTIONS 9-10: 1
AUDIT-C TOTAL SCORE: 0
HOW MANY STANDARD DRINKS CONTAINING ALCOHOL DO YOU HAVE ON A TYPICAL DAY: PATIENT DOES NOT DRINK
AUDIT-C TOTAL SCORE: 0
HOW OFTEN DO YOU HAVE 6 OR MORE DRINKS ON ONE OCCASION: NEVER

## 2024-01-09 ASSESSMENT — PAIN - FUNCTIONAL ASSESSMENT
PAIN_FUNCTIONAL_ASSESSMENT: 0-10

## 2024-01-09 ASSESSMENT — PAIN DESCRIPTION - LOCATION: LOCATION: FOOT

## 2024-01-09 ASSESSMENT — COGNITIVE AND FUNCTIONAL STATUS - GENERAL: PATIENT BASELINE BEDBOUND: YES

## 2024-01-09 ASSESSMENT — PAIN DESCRIPTION - ORIENTATION: ORIENTATION: RIGHT;LEFT

## 2024-01-09 NOTE — PROGRESS NOTES
Raheem Hilton is a 63 y.o. male on day 0 of admission presenting with UTI (urinary tract infection).      Subjective   Pt states that he is feeling better since first coming into the ER, In that his abdominal pain has already slightly improved since arriving at the ER, and that his numbness and tingling in his extremeties have also improved He had his catheter exchanged out on 1/7/24 per the nurse. Pt also had a bowel movement right before entering room and per bedside RN his sacral wound is slightly improved since his last admit.        Objective     Last Recorded Vitals  /74   Pulse 94   Temp 37.3 °C (99.2 °F)   Resp 19   Wt 79.4 kg (175 lb)   SpO2 94%   Intake/Output last 3 Shifts:  No intake or output data in the 24 hours ending 01/09/24 0915    Admission Weight  Weight: 79.4 kg (175 lb) (01/08/24 1742)    Daily Weight  01/08/24 : 79.4 kg (175 lb)    Image Results  ECG 12 Lead  Sinus tachycardia  Nonspecific ST abnormality  Abnormal ECG  When compared with ECG of 26-SEP-2023 19:37,  Previous ECG has undetermined rhythm, needs review  ST no longer elevated in Inferior leads  ST now depressed in Anterolateral leads  T wave inversion now evident in Inferior leads      Physical Exam  Appearance: normal appearance  Head: normocephalic and atraumatic  Eyes: conjunctivae clear, Perrla  Cardiovascular: Regular rate and rhythm  Pulmonary: faint expiratory wheeze  Abdominal: suprapubic cath present, bowel sounds x4  : heaton draining clear yellow with sediment   Musculoskeletal: slightly contracted lower extremities  Skin: sacral decubitus ulcer  Neurological: He is alert and oriented to person place and time  Psychiatric: mood and affect normal, behavior normal         Relevant Results  Results for orders placed or performed during the hospital encounter of 01/08/24 (from the past 24 hour(s))   CBC and Auto Differential   Result Value Ref Range    WBC 25.0 (H) 4.4 - 11.3 x10*3/uL    nRBC 0.0 0.0 - 0.0 /100  WBCs    RBC 5.20 4.50 - 5.90 x10*6/uL    Hemoglobin 15.7 13.5 - 17.5 g/dL    Hematocrit 47.9 41.0 - 52.0 %    MCV 92 80 - 100 fL    MCH 30.2 26.0 - 34.0 pg    MCHC 32.8 32.0 - 36.0 g/dL    RDW 13.4 11.5 - 14.5 %    Platelets 335 150 - 450 x10*3/uL    Neutrophils % 85.7 40.0 - 80.0 %    Immature Granulocytes %, Automated 0.5 0.0 - 0.9 %    Lymphocytes % 5.3 13.0 - 44.0 %    Monocytes % 6.8 2.0 - 10.0 %    Eosinophils % 1.4 0.0 - 6.0 %    Basophils % 0.3 0.0 - 2.0 %    Neutrophils Absolute 21.44 (H) 1.20 - 7.70 x10*3/uL    Immature Granulocytes Absolute, Automated 0.12 0.00 - 0.70 x10*3/uL    Lymphocytes Absolute 1.32 1.20 - 4.80 x10*3/uL    Monocytes Absolute 1.69 (H) 0.10 - 1.00 x10*3/uL    Eosinophils Absolute 0.36 0.00 - 0.70 x10*3/uL    Basophils Absolute 0.08 0.00 - 0.10 x10*3/uL   Comprehensive Metabolic Panel   Result Value Ref Range    Glucose 88 74 - 99 mg/dL    Sodium 132 (L) 136 - 145 mmol/L    Potassium 4.1 3.5 - 5.3 mmol/L    Chloride 95 (L) 98 - 107 mmol/L    Bicarbonate 27 21 - 32 mmol/L    Anion Gap 14 10 - 20 mmol/L    Urea Nitrogen 19 6 - 23 mg/dL    Creatinine 0.53 0.50 - 1.30 mg/dL    eGFR >90 >60 mL/min/1.73m*2    Calcium 9.4 8.6 - 10.3 mg/dL    Albumin 4.5 3.4 - 5.0 g/dL    Alkaline Phosphatase 146 (H) 33 - 136 U/L    Total Protein 8.1 6.4 - 8.2 g/dL    AST 25 9 - 39 U/L    Bilirubin, Total 0.4 0.0 - 1.2 mg/dL    ALT 33 10 - 52 U/L   Lactate   Result Value Ref Range    Lactate 3.0 (H) 0.4 - 2.0 mmol/L   Blood Culture    Specimen: Peripheral Venipuncture; Blood culture   Result Value Ref Range    Blood Culture Loaded on Instrument - Culture in progress    Light Blue Top   Result Value Ref Range    Extra Tube Hold for add-ons.    SST TOP   Result Value Ref Range    Extra Tube Hold for add-ons.    TSH   Result Value Ref Range    Thyroid Stimulating Hormone 1.15 0.44 - 3.98 mIU/L   Blood Culture    Specimen: Peripheral Venipuncture; Blood culture   Result Value Ref Range    Blood Culture Loaded on  Instrument - Culture in progress    Urinalysis with Reflex Culture and Microscopic   Result Value Ref Range    Color, Urine Yellow Straw, Yellow    Appearance, Urine Hazy (N) Clear    Specific Gravity, Urine 1.016 1.005 - 1.035    pH, Urine 7.0 5.0, 5.5, 6.0, 6.5, 7.0, 7.5, 8.0    Protein, Urine 100 (2+) (N) NEGATIVE mg/dL    Glucose, Urine NEGATIVE NEGATIVE mg/dL    Blood, Urine NEGATIVE NEGATIVE    Ketones, Urine NEGATIVE NEGATIVE mg/dL    Bilirubin, Urine NEGATIVE NEGATIVE    Urobilinogen, Urine <2.0 <2.0 mg/dL    Nitrite, Urine NEGATIVE NEGATIVE    Leukocyte Esterase, Urine LARGE (3+) (A) NEGATIVE   Microscopic Only, Urine   Result Value Ref Range    WBC, Urine 21-50 (A) 1-5, NONE /HPF    RBC, Urine 11-20 (A) NONE, 1-2, 3-5 /HPF    Bacteria, Urine 1+ (A) NONE SEEN /HPF    Mucus, Urine 1+ Reference range not established. /LPF   Lactate   Result Value Ref Range    Lactate 2.0 0.4 - 2.0 mmol/L   ECG 12 Lead   Result Value Ref Range    Ventricular Rate 140 BPM    Atrial Rate 140 BPM    TX Interval 150 ms    QRS Duration 80 ms    QT Interval 264 ms    QTC Calculation(Bazett) 403 ms    P Axis 60 degrees    R Axis 49 degrees    T Axis 12 degrees    QRS Count 23 beats    Q Onset 223 ms    P Onset 148 ms    P Offset 174 ms    T Offset 355 ms    QTC Fredericia 350 ms        ECG 12 Lead    Result Date: 1/9/2024  Sinus tachycardia Nonspecific ST abnormality Abnormal ECG When compared with ECG of 26-SEP-2023 19:37, Previous ECG has undetermined rhythm, needs review ST no longer elevated in Inferior leads ST now depressed in Anterolateral leads T wave inversion now evident in Inferior leads    CT abdomen pelvis w IV contrast    Result Date: 1/8/2024  STUDY: CT Abdomen and Pelvis with IV Contrast; 1/8/2024 at 6:48 p.m. INDICATION: Abdominal pain. COMPARISON: CT AP 9/29/2023. ACCESSION NUMBER(S): [id number] ORDERING CLINICIAN: EVELYN GANDHI TECHNIQUE: CT of the abdomen and pelvis was performed.  Contiguous axial  images were obtained at 3 mm slice thickness through the abdomen and pelvis. Coronal and sagittal reconstructions at 3 mm slice thickness were performed.  Omnipaque 350 67 mL was administered intravenously.  FINDINGS: LOWER CHEST: No cardiomegaly.  No pericardial effusion.  Lung bases are clear.  ABDOMEN:  LIVER: No hepatomegaly.  Smooth surface contour.  Normal attenuation.  BILE DUCTS: No intrahepatic or extrahepatic biliary ductal dilatation.  GALLBLADDER: The gallbladder is unremarkable. STOMACH: No abnormalities identified.  PANCREAS: No masses or ductal dilatation.  SPLEEN: The spleen is absent.  ADRENAL GLANDS: No thickening or nodules.  KIDNEYS AND URETERS: Kidneys are normal in size and location.  No renal or ureteral calculi.  PELVIS:  BLADDER: The urinary bladder is drained by a percutaneous Livingston catheter with the balloon in the lumen of the bladder.  REPRODUCTIVE ORGANS: No abnormalities identified.  BOWEL: Again seen are postoperative changes with surgical sutures in the distal ileum and in the sigmoid colon but the anastomoses are unremarkable.  The small bowel does not show any evidence of inflammation or obstruction.  The appendix remains unremarkable.  Most of the colon is mildly constipated with a very constipated distal sigmoid colon and rectum but no colon inflammatory changes are appreciated.  VESSELS: No abnormalities identified.  Abdominal aorta is normal in caliber.  PERITONEUM/RETROPERITONEUM/LYMPH NODES: No free fluid.  No pneumoperitoneum. No lymphadenopathy.  ABDOMINAL WALL: No abnormalities identified. SOFT TISSUES: There is a large midline decubitus ulcer over the lower coccyx with chronic osteomyelitis of the lower sacrum and coccyx but this is not changed significantly from the prior CT and there is no evidence of soft tissue abscess or fluid collection.  BONES: Partial compression fractures in L3 and T12 have not changed from the prior CT.  The the decubitus ulceration over the t  inferior sacrum and coccyx with chronic osteomyelitis has not progressed significantly when compared to the prior CT.    The colon is mildly constipated but there is much more significant constipation in the distal sigmoid colon and rectum.  No other bowel abnormalities are appreciated. Midline decubitus ulceration with chronic osteomyelitis in the lower sacrum and coccyx remains without evidence of a soft tissue abscess. This is not changed from the prior CT.. Signed by Dayton Ortega MD    XR chest 1 view    Result Date: 1/8/2024  STUDY: Chest Radiograph;  1/8/2024 5:58PM INDICATION: Unspecified abdominal pain. COMPARISON: XR chest 9/26/2023, CT chest 11/17/2019 ACCESSION NUMBER(S): [id number] ORDERING CLINICIAN: EVELYN GANDHI TECHNIQUE:  Frontal chest was obtained at 17:40 hours. FINDINGS: The heart is normal size. The lungs are clear. There are no detectable pleural effusions. There is no pneumothorax. Bony thorax is intact. Upper abdomen reveals moderate air distention of the bowel, interposed beneath both hemidiaphragms. There is no definite pneumoperitoneum.    Clear chest. Moderate air distention bowel, interposed beneath both hemidiaphragms. Remainder as above. Signed by Farshad Jones MD                 Assessment/Plan        Principal Problem:    UTI (urinary tract infection)  Active Problems:    Multiple sclerosis (CMS/HCC)    Neurogenic bladder    Sacral decubitus ulcer    Sepsis, due to unspecified organism, unspecified whether acute organ dysfunction present (CMS/HCC)    UTI:  -UA + for lg leukest awaiting for final urine culture  -WBC 25 on admit  -continue iv rocephin    Sepsis due to unspecified organism   -Temp of 99.2 on admit and , lactate of 3.0 and given IVF in ED with improvement of lactate to 2.0  -Wbc of 25--> awaiting repeat labs   -Continue iv rocephin   -Livingston recently changed out on 1/7/24    Hyponatremia  - will continue to monitor with ivf  -And encourage oral  intake     MS  -pt is wheelchair bound at baseline  -Pt has suprapubic cath due to MS and neurogenic bladder   -continue baclofen    Constipation  -continue miralax and senokot   -pt had bowel movement today    Neuropathy  -continue neurontin     Hyperlipidemia  -continue atorvastatin     Decubitus   -continue wound care  -q2h turns   -dietician consulted           Malnutrition          I agree with the dietitian's malnutrition diagnosis.         Usha Estrada, APRN-CNP

## 2024-01-09 NOTE — PROGRESS NOTES
01/09/24 1557   Discharge Planning   Living Arrangements Other (Comment)  (SNF [facility])   Support Systems Friends/neighbors   Assistance Needed Complete care/ anjana lift   Type of Residence Nursing home/residential care   Do you have animals or pets at home? No   Who is requesting discharge planning? Provider   Home or Post Acute Services Post acute facilities (Rehab/SNF/etc)   Type of Post Acute Facility Services Long term care   Patient expects to be discharged to: [facility]   Does the patient need discharge transport arranged? Yes   RoundTrip coordination needed? Yes   Has discharge transport been arranged? No     Care Transitions: Patient reviewed during care round meeting this Am and is not ready for discharge yet. ADOD 24 hours. Met with patient at bedside. He has been residing as long term care at [facility]. States he has been there approx 12 years. Discharge plan is to return to [facility] when ready. Will send return referral through [facility]. Care team to follow. Carlotta Zamora RN/TCC

## 2024-01-09 NOTE — SIGNIFICANT EVENT
Pt AAOx4, able to speak but quiet. Pt has Hx of MS and is paralyzed in b/l LE. Right hand dominant, can hold cup and utensil.

## 2024-01-09 NOTE — H&P
History Of Present Illness  Raheem Hilton is a 63 y.o. male presenting with abdominal pain and numbness.   He presented to the emergency room from [facility].  He presented secondary to abdominal pain with weakness numbness tingling of his extremities.  He states that this happens when he gets a urinary tract infection.  He presented to the emergency room and was found to likely have a recurrent urinary tract infection also had a lactic acid that resolved quickly with IV fluids  He has a chronic indwelling suprapubic catheter he has multiple sclerosis and is a functional paraplegic.  He uses a motorized wheelchair to get around.  He does have a chronic sacral decubitus ulcer with sacral osteomyelitis that is chronic.  He has a suprapubic catheter secondary to urinary retention with a neurogenic bladder, recurrent urinary tract infections, hyperlipidemia, hypertension.  Past Medical History  He has a past medical history of Anemia, Bowel perforation (CMS/[facility]), Buttock wound, unspecified laterality, subsequent encounter, COPD (chronic obstructive pulmonary disease) (CMS/[facility]), Extended spectrum beta lactamase (ESBL) resistance, GERD (gastroesophageal reflux disease), Hyperlipidemia, Hypertension, Hypo-osmolality and hyponatremia, Insomnia, Multiple sclerosis (CMS/[facility]), Neurogenic bladder, Neuromuscular dysfunction of bladder, Personal history of transient ischemic attack (TIA), and cerebral infarction without residual deficits (08/06/2020), Pulmonary embolism (CMS/[facility]), Sepsis with encephalopathy (CMS/[facility]), Septic shock (CMS/[facility]), Simple febrile convulsions (CMS/[facility]), Status post administration of tPA (rtPA) in a different facility within the last 24 hours prior to admission to current facility (08/06/2020), and UTI (urinary tract infection).    Surgical History  He has a past surgical history that includes MR angio head wo IV contrast (6/29/2020) and MR angio neck wo IV contrast (6/29/2020).     Social  History  He reports that he has never smoked. He has never used smokeless tobacco. No history on file for alcohol use and drug use.    Family History  No family history on file.     Allergies  Bactrim [sulfamethoxazole-trimethoprim], Cefepime, Doxycycline, and Heparin    Review of Systems   Constitutional: Negative.    HENT: Negative.     Eyes: Negative.    Respiratory: Negative.     Cardiovascular: Negative.    Gastrointestinal:  Positive for abdominal pain.   Endocrine: Negative.    Genitourinary: Negative.    Musculoskeletal: Negative.    Skin: Negative.    Allergic/Immunologic: Negative.    Neurological:  Positive for weakness and numbness.   Hematological: Negative.    Psychiatric/Behavioral: Negative.          Physical Exam  Constitutional:       Appearance: Normal appearance.   HENT:      Head: Normocephalic and atraumatic.      Right Ear: External ear normal.      Left Ear: External ear normal.      Nose: Nose normal.      Mouth/Throat:      Mouth: Mucous membranes are moist.      Pharynx: Oropharynx is clear.   Eyes:      Extraocular Movements: Extraocular movements intact.      Conjunctiva/sclera: Conjunctivae normal.      Pupils: Pupils are equal, round, and reactive to light.   Cardiovascular:      Rate and Rhythm: Normal rate and regular rhythm.   Pulmonary:      Effort: Pulmonary effort is normal.      Breath sounds: Normal breath sounds.   Abdominal:      General: Abdomen is flat.      Palpations: Abdomen is soft.      Comments: Suprapubic catheter present   Musculoskeletal:         General: No swelling.      Comments: Slightly contracted lower extremities   Skin:     General: Skin is warm and dry.      Comments: Very dry skin on lower extremities  Sacral decubitus ulcer   Neurological:      General: No focal deficit present.      Mental Status: He is alert and oriented to person, place, and time.   Psychiatric:         Mood and Affect: Mood normal.         Behavior: Behavior normal.          Last  "Recorded Vitals  Blood pressure 170/86, pulse (!) 126, temperature 37.3 °C (99.2 °F), resp. rate 20, height 1.854 m (6' 1\"), weight 79.4 kg (175 lb), SpO2 95 %.    Relevant Results        Reviewed imaging and lab results     Assessment/Plan   Principal Problem:    UTI (urinary tract infection)  Active Problems:    Multiple sclerosis (CMS/HCC)    Neurogenic bladder    Sacral decubitus ulcer    Sepsis, due to unspecified organism, unspecified whether acute organ dysfunction present (CMS/HCC)      Recurrent UTI  Constipation  Multiple sclerosis   Urinary retention with suprapubic catheter   Hypertension   Leukocytosis   Hyponatremia  Lactic acidosis that has now resolved  Hypothyroidism    Plan:  At this time he will be admitted to the inpatient unit for continued treatment  He has what appears to be a recurrent urinary tract infection with an elevated white count.  Does not have other signs of sepsis currently  He is actually hypertensive we will have to follow his blood pressure and make sure that it comes back down towards a more normal  I have checked his cultures back for a little bit.  Recently his urine cultures and blood cultures have been mostly negative  I will go ahead and keep him on Rocephin for now  I do not see evidence of extended spectrum infections in his recent past  I will check his thyroid studies  His lactic acid has resolved  Continue IV fluids  Follow urine cultures and blood cultures that have already been obtained  He does have a chronic indwelling suprapubic catheter  Continue his current other medications from home as he is on  We will consult wound care for his sacral decubitus ulcer that is already present  Heparin for DVT prophylaxis  Continue to follow cultures and will adjust antibiotics as needed               Jose Antonio Bowman, DO    "

## 2024-01-10 VITALS
WEIGHT: 175 LBS | RESPIRATION RATE: 13 BRPM | OXYGEN SATURATION: 99 % | TEMPERATURE: 96.8 F | HEART RATE: 78 BPM | HEIGHT: 73 IN | BODY MASS INDEX: 23.19 KG/M2 | SYSTOLIC BLOOD PRESSURE: 141 MMHG | DIASTOLIC BLOOD PRESSURE: 91 MMHG

## 2024-01-10 PROBLEM — N39.0 UTI (URINARY TRACT INFECTION): Status: RESOLVED | Noted: 2024-01-08 | Resolved: 2024-01-10

## 2024-01-10 PROBLEM — A41.9 SEPSIS, DUE TO UNSPECIFIED ORGANISM, UNSPECIFIED WHETHER ACUTE ORGAN DYSFUNCTION PRESENT (MULTI): Status: RESOLVED | Noted: 2024-01-08 | Resolved: 2024-01-10

## 2024-01-10 LAB
ALBUMIN SERPL BCP-MCNC: 3.2 G/DL (ref 3.4–5)
ALP SERPL-CCNC: 92 U/L (ref 33–136)
ALT SERPL W P-5'-P-CCNC: 31 U/L (ref 10–52)
ANION GAP SERPL CALC-SCNC: 9 MMOL/L (ref 10–20)
AST SERPL W P-5'-P-CCNC: 23 U/L (ref 9–39)
ATRIAL RATE: 140 BPM
BILIRUB SERPL-MCNC: 0.3 MG/DL (ref 0–1.2)
BUN SERPL-MCNC: 15 MG/DL (ref 6–23)
CALCIUM SERPL-MCNC: 8 MG/DL (ref 8.6–10.3)
CHLORIDE SERPL-SCNC: 108 MMOL/L (ref 98–107)
CO2 SERPL-SCNC: 21 MMOL/L (ref 21–32)
CREAT SERPL-MCNC: 0.39 MG/DL (ref 0.5–1.3)
EGFRCR SERPLBLD CKD-EPI 2021: >90 ML/MIN/1.73M*2
ERYTHROCYTE [DISTWIDTH] IN BLOOD BY AUTOMATED COUNT: 14.2 % (ref 11.5–14.5)
GLUCOSE SERPL-MCNC: 94 MG/DL (ref 74–99)
HCT VFR BLD AUTO: 40.3 % (ref 41–52)
HGB BLD-MCNC: 12.9 G/DL (ref 13.5–17.5)
MCH RBC QN AUTO: 29.9 PG (ref 26–34)
MCHC RBC AUTO-ENTMCNC: 32 G/DL (ref 32–36)
MCV RBC AUTO: 93 FL (ref 80–100)
NRBC BLD-RTO: 0 /100 WBCS (ref 0–0)
P AXIS: 60 DEGREES
P OFFSET: 174 MS
P ONSET: 148 MS
PLATELET # BLD AUTO: 256 X10*3/UL (ref 150–450)
POTASSIUM SERPL-SCNC: 3.4 MMOL/L (ref 3.5–5.3)
PR INTERVAL: 150 MS
PROT SERPL-MCNC: 6 G/DL (ref 6.4–8.2)
Q ONSET: 223 MS
QRS COUNT: 23 BEATS
QRS DURATION: 80 MS
QT INTERVAL: 264 MS
QTC CALCULATION(BAZETT): 403 MS
QTC FREDERICIA: 350 MS
R AXIS: 49 DEGREES
RBC # BLD AUTO: 4.32 X10*6/UL (ref 4.5–5.9)
SARS-COV-2 RNA RESP QL NAA+PROBE: NOT DETECTED
SODIUM SERPL-SCNC: 135 MMOL/L (ref 136–145)
T AXIS: 12 DEGREES
T OFFSET: 355 MS
VENTRICULAR RATE: 140 BPM
WBC # BLD AUTO: 14.2 X10*3/UL (ref 4.4–11.3)

## 2024-01-10 PROCEDURE — 36415 COLL VENOUS BLD VENIPUNCTURE: CPT | Performed by: INTERNAL MEDICINE

## 2024-01-10 PROCEDURE — 2500000004 HC RX 250 GENERAL PHARMACY W/ HCPCS (ALT 636 FOR OP/ED): Performed by: INTERNAL MEDICINE

## 2024-01-10 PROCEDURE — 85027 COMPLETE CBC AUTOMATED: CPT | Performed by: INTERNAL MEDICINE

## 2024-01-10 PROCEDURE — 2500000001 HC RX 250 WO HCPCS SELF ADMINISTERED DRUGS (ALT 637 FOR MEDICARE OP): Performed by: INTERNAL MEDICINE

## 2024-01-10 PROCEDURE — 87635 SARS-COV-2 COVID-19 AMP PRB: CPT | Performed by: NURSE PRACTITIONER

## 2024-01-10 PROCEDURE — 99231 SBSQ HOSP IP/OBS SF/LOW 25: CPT | Performed by: NURSE PRACTITIONER

## 2024-01-10 PROCEDURE — 84075 ASSAY ALKALINE PHOSPHATASE: CPT | Performed by: INTERNAL MEDICINE

## 2024-01-10 PROCEDURE — 2500000004 HC RX 250 GENERAL PHARMACY W/ HCPCS (ALT 636 FOR OP/ED): Performed by: NURSE PRACTITIONER

## 2024-01-10 PROCEDURE — 94761 N-INVAS EAR/PLS OXIMETRY MLT: CPT

## 2024-01-10 PROCEDURE — 2500000001 HC RX 250 WO HCPCS SELF ADMINISTERED DRUGS (ALT 637 FOR MEDICARE OP): Performed by: NURSE PRACTITIONER

## 2024-01-10 RX ORDER — CIPROFLOXACIN 500 MG/1
500 TABLET ORAL EVERY 12 HOURS SCHEDULED
Qty: 10 TABLET | Refills: 0
Start: 2024-01-10 | End: 2024-01-15

## 2024-01-10 RX ORDER — POTASSIUM CHLORIDE 20 MEQ/1
40 TABLET, EXTENDED RELEASE ORAL ONCE
Status: COMPLETED | OUTPATIENT
Start: 2024-01-10 | End: 2024-01-10

## 2024-01-10 RX ORDER — CIPROFLOXACIN 500 MG/1
500 TABLET ORAL EVERY 12 HOURS SCHEDULED
Status: DISCONTINUED | OUTPATIENT
Start: 2024-01-10 | End: 2024-01-10 | Stop reason: HOSPADM

## 2024-01-10 RX ADMIN — SODIUM CHLORIDE 125 ML/HR: 9 INJECTION, SOLUTION INTRAVENOUS at 05:56

## 2024-01-10 RX ADMIN — METHIMAZOLE 5 MG: 5 TABLET ORAL at 05:57

## 2024-01-10 RX ADMIN — HYDROCODONE BITARTRATE AND ACETAMINOPHEN 1 TABLET: 5; 325 TABLET ORAL at 10:16

## 2024-01-10 RX ADMIN — GABAPENTIN 600 MG: 300 CAPSULE ORAL at 10:15

## 2024-01-10 RX ADMIN — CARBAMAZEPINE 200 MG: 200 TABLET ORAL at 10:15

## 2024-01-10 RX ADMIN — CIPROFLOXACIN HYDROCHLORIDE 500 MG: 500 TABLET, FILM COATED ORAL at 12:17

## 2024-01-10 RX ADMIN — POTASSIUM CHLORIDE 40 MEQ: 1500 TABLET, EXTENDED RELEASE ORAL at 10:15

## 2024-01-10 RX ADMIN — ENOXAPARIN SODIUM 40 MG: 40 INJECTION SUBCUTANEOUS at 10:13

## 2024-01-10 RX ADMIN — BACLOFEN 10 MG: 10 TABLET ORAL at 10:14

## 2024-01-10 RX ADMIN — METOPROLOL SUCCINATE 25 MG: 25 TABLET, EXTENDED RELEASE ORAL at 10:14

## 2024-01-10 RX ADMIN — Medication 1 CAPSULE: at 10:14

## 2024-01-10 ASSESSMENT — PAIN SCALES - GENERAL
PAINLEVEL_OUTOF10: 6
PAINLEVEL_OUTOF10: 0 - NO PAIN

## 2024-01-10 ASSESSMENT — PAIN - FUNCTIONAL ASSESSMENT: PAIN_FUNCTIONAL_ASSESSMENT: 0-10

## 2024-01-10 ASSESSMENT — PAIN DESCRIPTION - ORIENTATION: ORIENTATION: RIGHT;LEFT

## 2024-01-10 ASSESSMENT — PAIN DESCRIPTION - LOCATION: LOCATION: FOOT

## 2024-01-10 NOTE — CARE PLAN
The patient's goals for the shift include      The clinical goals for the shift include pt will remain afebrile and hydrated this shift.    Pt. In bed with eyes closed for most of the night. No distress noted.  Respirations even and unlabored.  Denies any pain.  Turned and changed, incontinent of 2 bms.  Afebrile this shift and IV hydration of normal saline 125ml/hr.  Bed alarm on and functioning.  Call light within reach, will continue to monitor.,

## 2024-01-10 NOTE — NURSING NOTE
Discharge Note: 1/10/2024 1516 Discharged via stretcher by [facility] to SNF, paperwork packet sent with transporter, personal belongings taken by transporter, no distress noted, no complaints voiced. Cristel BENAVIDEZ

## 2024-01-10 NOTE — DISCHARGE SUMMARY
Discharge Diagnosis  UTI (urinary tract infection)    Issues Requiring Follow-Up  Pt is to follow up for suprapubic cath related UTI    Discharge Meds     Your medication list        START taking these medications        Instructions Last Dose Given Next Dose Due   ciprofloxacin 500 mg tablet  Commonly known as: Cipro      Take 1 tablet (500 mg) by mouth every 12 hours for 5 days.              CONTINUE taking these medications        Instructions Last Dose Given Next Dose Due   acetaminophen 325 mg tablet  Commonly known as: Tylenol      Take 2 tablets (650 mg) by mouth every 4 hours if needed for moderate pain (4 - 6).       albuterol 2.5 mg /3 mL (0.083 %) nebulizer solution      Take 3 mL (2.5 mg) by nebulization every 6 hours if needed for wheezing.       atorvastatin 80 mg tablet  Commonly known as: Lipitor           baclofen 10 mg tablet  Commonly known as: Lioresal           carBAMazepine 200 mg tablet  Commonly known as: TEGretol           gabapentin 300 mg capsule  Commonly known as: Neurontin           lactobacillus acidophilus capsule           methIMAzole 5 mg tablet  Commonly known as: Tapazole           metoprolol succinate XL 25 mg 24 hr tablet  Commonly known as: Toprol-XL                     Where to Get Your Medications        Information about where to get these medications is not yet available    Ask your nurse or doctor about these medications  ciprofloxacin 500 mg tablet         Test Results Pending At Discharge  Pending Labs       Order Current Status    Extra Urine Gray Tube Collected (01/08/24 6016)    Urinalysis with Reflex Culture and Microscopic In process    Blood Culture Preliminary result    Blood Culture Preliminary result            Hospital Course   Raheem Hilton is a 63 y.o. male presenting with abdominal pain and numbness.   He presented to the emergency room from [facility].  He presented secondary to abdominal pain with weakness numbness tingling of his  extremities.  He states that this happens when he gets a urinary tract infection.  He presented to the emergency room and was found to likely have a recurrent urinary tract infection also had a lactic acid that resolved quickly with IV fluids  He has a chronic indwelling suprapubic catheter he has multiple sclerosis and is a functional paraplegic.  He uses a motorized wheelchair to get around.  He does have a chronic sacral decubitus ulcer with sacral osteomyelitis that is chronic.  He has a suprapubic catheter secondary to urinary retention with a neurogenic bladder, recurrent urinary tract infections, hyperlipidemia, hypertension.    His suprapubic cath was changed on 1/7/24 per nursing, Pt was found to have elevated WBC at 25 on admit and this came down to 14.2 on discharge. Pt was started on IV rocephin on 1/8-1/9 and then his urine cultures were contaminated and this was changed to oral cipro upon discharge. It is recommended that He follow up with his pcp in 1-2 weeks for hospital follow up.     Pertinent Physical Exam At Time of Discharge  Physical Exam  Appearance: normal appearance  Head: normocephalic and atraumatic  Eyes: conjunctivae clear, Perrla  Cardiovascular: Regular rate and rhythm  Pulmonary: faint expiratory wheeze  Abdominal: suprapubic cath present, bowel sounds x4  : heaton draining clear yellow with sediment   Musculoskeletal: slightly contracted lower extremities  Skin: sacral decubitus ulcer  Neurological: He is alert and oriented to person place and time  Psychiatric: mood and affect normal, behavior normal           Outpatient Follow-Up  No future appointments.      Usha Estrada, APRN-CNP

## 2024-01-10 NOTE — PROGRESS NOTES
Pt reviewed during Care Rounds today and he is ready for discharge. He will return to [facility] where he resides for long-term care. Final updates/orders attached in [facility] for [facility]. Transportation arranged for 215pm. No further needs identified.       CEDRIC Jain

## 2024-01-10 NOTE — NURSING NOTE
Pt's IV was removed intact prior to discharge on [facility] via squad back to [place]. Report called to nurse.

## 2024-01-10 NOTE — DISCHARGE INSTRUCTIONS
Discharge back to [facility]  Prescribed Cipro for UTI  It is recommended that you follow up with your primary care physician in 1-2 weeks for hospital follow up  Thank you for allowing us to take care of you at [facility].

## 2024-01-11 NOTE — DOCUMENTATION CLARIFICATION NOTE
"    PATIENT:               ARNOLD VO  ACCT #:                  [id number]  MRN:                       [id number]  :                       1960  ADMIT DATE:       2024 5:18 PM  DISCH DATE:        1/10/2024 3:16 PM  RESPONDING PROVIDER #:        [id number]          PROVIDER RESPONSE TEXT:    Sepsis was a differential diagnosis and ruled out after study    CDI QUERY TEXT:    UH_CV Sepsis    Instruction:  Based on your assessment of the patient and the clinical information, please provide the requested documentation by clicking on the appropriate radio button and enter any additional information if prompted.    Question: Sepsis was documented in the medical record. Based on the documentation and the clinical information, can the diagnosis be further clarified as        When answering this query, please exercise your independent professional judgment. The fact that a question is being asked, does not imply that any particular answer is desired or expected.    The patient's clinical indicators include:  Clinical Information: 63 year old male to ED from [facility] with concerns for UTI    Documented Diagnosis: ED provider documented \"Urosepsis\".  H and P documents \"UTI\" , \"Sepsis, due to unspecified organism\" but then documents \"Does not have other signs of sepsis currently\".  Progress note 24 again documents \"Sepsis\".  No organ dysfunction documented    Clinical Indicators:  -Vital Signs:  -37.1-195-/105-95 percent sat RA  -WBC: 24- 25.0  -Microbiology Results: 24- blood and urine sent- blood cultures NTD and Urine shows multiple organisms and probable contaminant  -Band Neutrophil Count/percent Band Neutrophil: 24- 21.44/85.7  -Lactic acid: 24-3.0  -BUN/Creat: 24-19/.53  -Blood cultures: NTD  -Bilirubin: 24 0.4  -MAP:  -Waterford Coma Scale:  -PAO2/FIO2: 95 percent RA and remains on RA to date  -Procalcitonin: n/a  -Platelets: 24 335  -Other clinical " indicators:    Treatment: IV Rocephin and 1 liter IV fluid bolus    Risk Factors: Chronic suprapubic catheter  Options provided:  -- Sepsis was a differential diagnosis and ruled out after study  -- Sepsis with other organ dysfunction, Please specify additional information below  -- Other - I will add my own diagnosis  -- Refer to Clinical Documentation Reviewer    Query created by: Key Colvin on 1/10/2024 3:15 PM      Electronically signed by:  ABDOUL DIAL 1/11/2024 11:06 AM

## 2024-01-13 LAB
BACTERIA BLD CULT: NORMAL
BACTERIA BLD CULT: NORMAL

## 2024-02-08 ENCOUNTER — HOSPITAL ENCOUNTER (INPATIENT)
Facility: HOSPITAL | Age: 64
LOS: 2 days | Discharge: NURSING, REHAB OR LONG-TERM CARE | DRG: 699 | End: 2024-02-10
Attending: STUDENT IN AN ORGANIZED HEALTH CARE EDUCATION/TRAINING PROGRAM | Admitting: STUDENT IN AN ORGANIZED HEALTH CARE EDUCATION/TRAINING PROGRAM
Payer: MEDICARE

## 2024-02-08 DIAGNOSIS — T83.9XXD URINARY CATHETER COMPLICATION, SUBSEQUENT ENCOUNTER: ICD-10-CM

## 2024-02-08 DIAGNOSIS — A41.9 SEPSIS DUE TO URINARY TRACT INFECTION (MULTI): Primary | ICD-10-CM

## 2024-02-08 DIAGNOSIS — N39.0 SEPSIS DUE TO URINARY TRACT INFECTION (MULTI): Primary | ICD-10-CM

## 2024-02-08 LAB
ALBUMIN SERPL BCP-MCNC: 3.8 G/DL (ref 3.4–5)
ALP SERPL-CCNC: 122 U/L (ref 33–136)
ALT SERPL W P-5'-P-CCNC: 19 U/L (ref 10–52)
ANION GAP SERPL CALC-SCNC: 11 MMOL/L (ref 10–20)
APPEARANCE UR: ABNORMAL
AST SERPL W P-5'-P-CCNC: 15 U/L (ref 9–39)
BACTERIA #/AREA URNS AUTO: ABNORMAL /HPF
BASOPHILS # BLD AUTO: 0.05 X10*3/UL (ref 0–0.1)
BASOPHILS NFR BLD AUTO: 0.3 %
BILIRUB SERPL-MCNC: 0.3 MG/DL (ref 0–1.2)
BILIRUB UR STRIP.AUTO-MCNC: NEGATIVE MG/DL
BUN SERPL-MCNC: 15 MG/DL (ref 6–23)
CALCIUM SERPL-MCNC: 8.6 MG/DL (ref 8.6–10.3)
CHLORIDE SERPL-SCNC: 101 MMOL/L (ref 98–107)
CO2 SERPL-SCNC: 21 MMOL/L (ref 21–32)
COLOR UR: YELLOW
CREAT SERPL-MCNC: 0.37 MG/DL (ref 0.5–1.3)
EGFRCR SERPLBLD CKD-EPI 2021: >90 ML/MIN/1.73M*2
EOSINOPHIL # BLD AUTO: 0.28 X10*3/UL (ref 0–0.7)
EOSINOPHIL NFR BLD AUTO: 1.9 %
ERYTHROCYTE [DISTWIDTH] IN BLOOD BY AUTOMATED COUNT: 13.9 % (ref 11.5–14.5)
GLUCOSE SERPL-MCNC: 101 MG/DL (ref 74–99)
GLUCOSE UR STRIP.AUTO-MCNC: NEGATIVE MG/DL
HCT VFR BLD AUTO: 42.7 % (ref 41–52)
HGB BLD-MCNC: 14.3 G/DL (ref 13.5–17.5)
IMM GRANULOCYTES # BLD AUTO: 0.05 X10*3/UL (ref 0–0.7)
IMM GRANULOCYTES NFR BLD AUTO: 0.3 % (ref 0–0.9)
KETONES UR STRIP.AUTO-MCNC: ABNORMAL MG/DL
LEUKOCYTE ESTERASE UR QL STRIP.AUTO: ABNORMAL
LYMPHOCYTES # BLD AUTO: 1.38 X10*3/UL (ref 1.2–4.8)
LYMPHOCYTES NFR BLD AUTO: 9.3 %
MCH RBC QN AUTO: 30.4 PG (ref 26–34)
MCHC RBC AUTO-ENTMCNC: 33.5 G/DL (ref 32–36)
MCV RBC AUTO: 91 FL (ref 80–100)
MONOCYTES # BLD AUTO: 1.44 X10*3/UL (ref 0.1–1)
MONOCYTES NFR BLD AUTO: 9.7 %
MUCOUS THREADS #/AREA URNS AUTO: ABNORMAL /LPF
NEUTROPHILS # BLD AUTO: 11.7 X10*3/UL (ref 1.2–7.7)
NEUTROPHILS NFR BLD AUTO: 78.5 %
NITRITE UR QL STRIP.AUTO: NEGATIVE
NRBC BLD-RTO: 0 /100 WBCS (ref 0–0)
PH UR STRIP.AUTO: 6 [PH]
PLATELET # BLD AUTO: 247 X10*3/UL (ref 150–450)
POTASSIUM SERPL-SCNC: 3.4 MMOL/L (ref 3.5–5.3)
PROT SERPL-MCNC: 6.9 G/DL (ref 6.4–8.2)
PROT UR STRIP.AUTO-MCNC: ABNORMAL MG/DL
RBC # BLD AUTO: 4.71 X10*6/UL (ref 4.5–5.9)
RBC # UR STRIP.AUTO: NEGATIVE /UL
RBC #/AREA URNS AUTO: ABNORMAL /HPF
SODIUM SERPL-SCNC: 130 MMOL/L (ref 136–145)
SP GR UR STRIP.AUTO: 1.02
UROBILINOGEN UR STRIP.AUTO-MCNC: <2 MG/DL
WBC # BLD AUTO: 14.9 X10*3/UL (ref 4.4–11.3)
WBC #/AREA URNS AUTO: ABNORMAL /HPF

## 2024-02-08 PROCEDURE — 2500000001 HC RX 250 WO HCPCS SELF ADMINISTERED DRUGS (ALT 637 FOR MEDICARE OP): Performed by: NURSE PRACTITIONER

## 2024-02-08 PROCEDURE — 94640 AIRWAY INHALATION TREATMENT: CPT | Performed by: STUDENT IN AN ORGANIZED HEALTH CARE EDUCATION/TRAINING PROGRAM

## 2024-02-08 PROCEDURE — 94761 N-INVAS EAR/PLS OXIMETRY MLT: CPT

## 2024-02-08 PROCEDURE — 2500000004 HC RX 250 GENERAL PHARMACY W/ HCPCS (ALT 636 FOR OP/ED): Performed by: STUDENT IN AN ORGANIZED HEALTH CARE EDUCATION/TRAINING PROGRAM

## 2024-02-08 PROCEDURE — 2500000002 HC RX 250 W HCPCS SELF ADMINISTERED DRUGS (ALT 637 FOR MEDICARE OP, ALT 636 FOR OP/ED): Performed by: STUDENT IN AN ORGANIZED HEALTH CARE EDUCATION/TRAINING PROGRAM

## 2024-02-08 PROCEDURE — 85025 COMPLETE CBC W/AUTO DIFF WBC: CPT | Performed by: STUDENT IN AN ORGANIZED HEALTH CARE EDUCATION/TRAINING PROGRAM

## 2024-02-08 PROCEDURE — 1200000002 HC GENERAL ROOM WITH TELEMETRY DAILY

## 2024-02-08 PROCEDURE — 87086 URINE CULTURE/COLONY COUNT: CPT | Mod: SAMLAB | Performed by: STUDENT IN AN ORGANIZED HEALTH CARE EDUCATION/TRAINING PROGRAM

## 2024-02-08 PROCEDURE — 81001 URINALYSIS AUTO W/SCOPE: CPT | Performed by: STUDENT IN AN ORGANIZED HEALTH CARE EDUCATION/TRAINING PROGRAM

## 2024-02-08 PROCEDURE — 2500000001 HC RX 250 WO HCPCS SELF ADMINISTERED DRUGS (ALT 637 FOR MEDICARE OP): Performed by: STUDENT IN AN ORGANIZED HEALTH CARE EDUCATION/TRAINING PROGRAM

## 2024-02-08 PROCEDURE — 9420000001 HC RT PATIENT EDUCATION 5 MIN

## 2024-02-08 PROCEDURE — 36415 COLL VENOUS BLD VENIPUNCTURE: CPT | Performed by: STUDENT IN AN ORGANIZED HEALTH CARE EDUCATION/TRAINING PROGRAM

## 2024-02-08 PROCEDURE — 2500000001 HC RX 250 WO HCPCS SELF ADMINISTERED DRUGS (ALT 637 FOR MEDICARE OP)

## 2024-02-08 PROCEDURE — 80053 COMPREHEN METABOLIC PANEL: CPT | Performed by: STUDENT IN AN ORGANIZED HEALTH CARE EDUCATION/TRAINING PROGRAM

## 2024-02-08 PROCEDURE — 99223 1ST HOSP IP/OBS HIGH 75: CPT | Performed by: STUDENT IN AN ORGANIZED HEALTH CARE EDUCATION/TRAINING PROGRAM

## 2024-02-08 PROCEDURE — 94640 AIRWAY INHALATION TREATMENT: CPT

## 2024-02-08 PROCEDURE — 94664 DEMO&/EVAL PT USE INHALER: CPT

## 2024-02-08 RX ORDER — HYDROCODONE BITARTRATE AND ACETAMINOPHEN 5; 325 MG/1; MG/1
1 TABLET ORAL EVERY 12 HOURS PRN
Status: DISCONTINUED | OUTPATIENT
Start: 2024-02-08 | End: 2024-02-10 | Stop reason: HOSPADM

## 2024-02-08 RX ORDER — ALBUTEROL SULFATE 0.83 MG/ML
2.5 SOLUTION RESPIRATORY (INHALATION) EVERY 6 HOURS PRN
Status: DISCONTINUED | OUTPATIENT
Start: 2024-02-08 | End: 2024-02-10 | Stop reason: HOSPADM

## 2024-02-08 RX ORDER — ATORVASTATIN CALCIUM 80 MG/1
80 TABLET, FILM COATED ORAL NIGHTLY
Status: DISCONTINUED | OUTPATIENT
Start: 2024-02-08 | End: 2024-02-10 | Stop reason: HOSPADM

## 2024-02-08 RX ORDER — ACETAMINOPHEN 650 MG/1
650 SUPPOSITORY RECTAL EVERY 4 HOURS PRN
Status: DISCONTINUED | OUTPATIENT
Start: 2024-02-08 | End: 2024-02-10 | Stop reason: HOSPADM

## 2024-02-08 RX ORDER — METOPROLOL SUCCINATE 25 MG/1
25 TABLET, EXTENDED RELEASE ORAL DAILY
Status: DISCONTINUED | OUTPATIENT
Start: 2024-02-08 | End: 2024-02-10 | Stop reason: HOSPADM

## 2024-02-08 RX ORDER — ACETAMINOPHEN 325 MG/1
650 TABLET ORAL EVERY 4 HOURS PRN
Status: DISCONTINUED | OUTPATIENT
Start: 2024-02-08 | End: 2024-02-10 | Stop reason: HOSPADM

## 2024-02-08 RX ORDER — CARBAMAZEPINE 200 MG/1
200 TABLET ORAL 2 TIMES DAILY
Status: DISCONTINUED | OUTPATIENT
Start: 2024-02-08 | End: 2024-02-10 | Stop reason: HOSPADM

## 2024-02-08 RX ORDER — METHIMAZOLE 5 MG/1
5 TABLET ORAL EVERY 8 HOURS SCHEDULED
Status: DISCONTINUED | OUTPATIENT
Start: 2024-02-08 | End: 2024-02-10 | Stop reason: HOSPADM

## 2024-02-08 RX ORDER — HYDROCODONE BITARTRATE AND ACETAMINOPHEN 5; 325 MG/1; MG/1
1 TABLET ORAL EVERY 12 HOURS PRN
COMMUNITY

## 2024-02-08 RX ORDER — POTASSIUM CHLORIDE 1.5 G/1.58G
40 POWDER, FOR SOLUTION ORAL ONCE
Status: COMPLETED | OUTPATIENT
Start: 2024-02-08 | End: 2024-02-08

## 2024-02-08 RX ORDER — POLYETHYLENE GLYCOL 3350 17 G/17G
17 POWDER, FOR SOLUTION ORAL DAILY
Status: DISCONTINUED | OUTPATIENT
Start: 2024-02-08 | End: 2024-02-10 | Stop reason: HOSPADM

## 2024-02-08 RX ORDER — SODIUM CHLORIDE 9 MG/ML
50 INJECTION, SOLUTION INTRAVENOUS CONTINUOUS
Status: DISCONTINUED | OUTPATIENT
Start: 2024-02-08 | End: 2024-02-08

## 2024-02-08 RX ORDER — ATORVASTATIN CALCIUM 80 MG/1
80 TABLET, FILM COATED ORAL NIGHTLY
COMMUNITY

## 2024-02-08 RX ORDER — ONDANSETRON HYDROCHLORIDE 2 MG/ML
4 INJECTION, SOLUTION INTRAVENOUS EVERY 8 HOURS PRN
Status: DISCONTINUED | OUTPATIENT
Start: 2024-02-08 | End: 2024-02-10 | Stop reason: HOSPADM

## 2024-02-08 RX ORDER — ACETAMINOPHEN 160 MG/5ML
650 SOLUTION ORAL EVERY 4 HOURS PRN
Status: DISCONTINUED | OUTPATIENT
Start: 2024-02-08 | End: 2024-02-10 | Stop reason: HOSPADM

## 2024-02-08 RX ORDER — POTASSIUM CHLORIDE 20 MEQ/1
40 TABLET, EXTENDED RELEASE ORAL ONCE
Status: COMPLETED | OUTPATIENT
Start: 2024-02-08 | End: 2024-02-08

## 2024-02-08 RX ORDER — GABAPENTIN 300 MG/1
600 CAPSULE ORAL 3 TIMES DAILY
Status: DISCONTINUED | OUTPATIENT
Start: 2024-02-08 | End: 2024-02-10 | Stop reason: HOSPADM

## 2024-02-08 RX ORDER — BACLOFEN 10 MG/1
10 TABLET ORAL 2 TIMES DAILY
Status: DISCONTINUED | OUTPATIENT
Start: 2024-02-08 | End: 2024-02-10 | Stop reason: HOSPADM

## 2024-02-08 RX ORDER — CEFTRIAXONE 1 G/50ML
1 INJECTION, SOLUTION INTRAVENOUS EVERY 24 HOURS
Status: DISCONTINUED | OUTPATIENT
Start: 2024-02-08 | End: 2024-02-10 | Stop reason: HOSPADM

## 2024-02-08 RX ORDER — METHIMAZOLE 5 MG/1
TABLET ORAL
Status: COMPLETED
Start: 2024-02-08 | End: 2024-02-08

## 2024-02-08 RX ORDER — ONDANSETRON 4 MG/1
4 TABLET, ORALLY DISINTEGRATING ORAL EVERY 8 HOURS PRN
Status: DISCONTINUED | OUTPATIENT
Start: 2024-02-08 | End: 2024-02-10 | Stop reason: HOSPADM

## 2024-02-08 RX ADMIN — GABAPENTIN 600 MG: 300 CAPSULE ORAL at 20:28

## 2024-02-08 RX ADMIN — METHIMAZOLE 5 MG: 5 TABLET ORAL at 05:58

## 2024-02-08 RX ADMIN — Medication 1 CAPSULE: at 20:28

## 2024-02-08 RX ADMIN — CEFTRIAXONE SODIUM 1 G: 1 INJECTION, SOLUTION INTRAVENOUS at 20:32

## 2024-02-08 RX ADMIN — CARBAMAZEPINE 200 MG: 200 TABLET ORAL at 20:27

## 2024-02-08 RX ADMIN — ALBUTEROL SULFATE 2.5 MG: 2.5 SOLUTION RESPIRATORY (INHALATION) at 18:26

## 2024-02-08 RX ADMIN — POTASSIUM CHLORIDE 40 MEQ: 1.5 POWDER, FOR SOLUTION ORAL at 09:51

## 2024-02-08 RX ADMIN — GABAPENTIN 600 MG: 300 CAPSULE ORAL at 08:34

## 2024-02-08 RX ADMIN — CARBAMAZEPINE 200 MG: 200 TABLET ORAL at 08:33

## 2024-02-08 RX ADMIN — ALBUTEROL SULFATE 2.5 MG: 2.5 SOLUTION RESPIRATORY (INHALATION) at 11:38

## 2024-02-08 RX ADMIN — METOPROLOL SUCCINATE 25 MG: 25 TABLET, EXTENDED RELEASE ORAL at 08:33

## 2024-02-08 RX ADMIN — METHIMAZOLE 5 MG: 5 TABLET ORAL at 14:43

## 2024-02-08 RX ADMIN — ATORVASTATIN CALCIUM 80 MG: 80 TABLET, FILM COATED ORAL at 20:28

## 2024-02-08 RX ADMIN — BACLOFEN 10 MG: 10 TABLET ORAL at 08:34

## 2024-02-08 RX ADMIN — Medication 1 CAPSULE: at 14:43

## 2024-02-08 RX ADMIN — METHIMAZOLE 5 MG: 5 TABLET ORAL at 21:49

## 2024-02-08 RX ADMIN — BACLOFEN 10 MG: 10 TABLET ORAL at 20:27

## 2024-02-08 RX ADMIN — HYDROCODONE BITARTRATE AND ACETAMINOPHEN 1 TABLET: 5; 325 TABLET ORAL at 21:21

## 2024-02-08 RX ADMIN — GABAPENTIN 600 MG: 300 CAPSULE ORAL at 14:43

## 2024-02-08 RX ADMIN — Medication 1 CAPSULE: at 08:33

## 2024-02-08 RX ADMIN — ALBUTEROL SULFATE 2.5 MG: 2.5 SOLUTION RESPIRATORY (INHALATION) at 06:12

## 2024-02-08 RX ADMIN — POTASSIUM CHLORIDE 40 MEQ: 1500 TABLET, EXTENDED RELEASE ORAL at 06:59

## 2024-02-08 SDOH — SOCIAL STABILITY: SOCIAL INSECURITY: WERE YOU ABLE TO COMPLETE ALL THE BEHAVIORAL HEALTH SCREENINGS?: YES

## 2024-02-08 SDOH — SOCIAL STABILITY: SOCIAL INSECURITY: ARE THERE ANY APPARENT SIGNS OF INJURIES/BEHAVIORS THAT COULD BE RELATED TO ABUSE/NEGLECT?: NO

## 2024-02-08 SDOH — SOCIAL STABILITY: SOCIAL INSECURITY: ARE YOU OR HAVE YOU BEEN THREATENED OR ABUSED PHYSICALLY, EMOTIONALLY, OR SEXUALLY BY ANYONE?: NO

## 2024-02-08 SDOH — SOCIAL STABILITY: SOCIAL INSECURITY: ABUSE: ADULT

## 2024-02-08 SDOH — SOCIAL STABILITY: SOCIAL INSECURITY: DO YOU FEEL UNSAFE GOING BACK TO THE PLACE WHERE YOU ARE LIVING?: NO

## 2024-02-08 SDOH — SOCIAL STABILITY: SOCIAL INSECURITY: DOES ANYONE TRY TO KEEP YOU FROM HAVING/CONTACTING OTHER FRIENDS OR DOING THINGS OUTSIDE YOUR HOME?: NO

## 2024-02-08 SDOH — SOCIAL STABILITY: SOCIAL INSECURITY: HAS ANYONE EVER THREATENED TO HURT YOUR FAMILY OR YOUR PETS?: NO

## 2024-02-08 SDOH — SOCIAL STABILITY: SOCIAL INSECURITY: DO YOU FEEL ANYONE HAS EXPLOITED OR TAKEN ADVANTAGE OF YOU FINANCIALLY OR OF YOUR PERSONAL PROPERTY?: NO

## 2024-02-08 SDOH — SOCIAL STABILITY: SOCIAL INSECURITY: HAVE YOU HAD THOUGHTS OF HARMING ANYONE ELSE?: NO

## 2024-02-08 ASSESSMENT — PAIN - FUNCTIONAL ASSESSMENT
PAIN_FUNCTIONAL_ASSESSMENT: 0-10

## 2024-02-08 ASSESSMENT — PAIN SCALES - GENERAL
PAINLEVEL_OUTOF10: 0 - NO PAIN
PAINLEVEL_OUTOF10: 4
PAINLEVEL_OUTOF10: 0 - NO PAIN

## 2024-02-08 ASSESSMENT — COGNITIVE AND FUNCTIONAL STATUS - GENERAL
MOVING FROM LYING ON BACK TO SITTING ON SIDE OF FLAT BED WITH BEDRAILS: A LOT
DAILY ACTIVITIY SCORE: 15
MOVING TO AND FROM BED TO CHAIR: TOTAL
TURNING FROM BACK TO SIDE WHILE IN FLAT BAD: A LOT
DRESSING REGULAR LOWER BODY CLOTHING: TOTAL
MOBILITY SCORE: 14
PERSONAL GROOMING: A LITTLE
DRESSING REGULAR UPPER BODY CLOTHING: A LITTLE
PATIENT BASELINE BEDBOUND: YES
STANDING UP FROM CHAIR USING ARMS: TOTAL
TOILETING: A LOT
EATING MEALS: A LITTLE
HELP NEEDED FOR BATHING: A LITTLE

## 2024-02-08 ASSESSMENT — PAIN DESCRIPTION - ORIENTATION: ORIENTATION: RIGHT;LEFT

## 2024-02-08 ASSESSMENT — ACTIVITIES OF DAILY LIVING (ADL)
ADEQUATE_TO_COMPLETE_ADL: YES
DRESSING YOURSELF: DEPENDENT
LACK_OF_TRANSPORTATION: NO
TOILETING: DEPENDENT
PATIENT'S MEMORY ADEQUATE TO SAFELY COMPLETE DAILY ACTIVITIES?: YES
LACK_OF_TRANSPORTATION: NO
HEARING - RIGHT EAR: FUNCTIONAL
WALKS IN HOME: UNABLE TO ASSESS
HEARING - LEFT EAR: FUNCTIONAL
GROOMING: DEPENDENT
FEEDING YOURSELF: INDEPENDENT
JUDGMENT_ADEQUATE_SAFELY_COMPLETE_DAILY_ACTIVITIES: YES
BATHING: DEPENDENT

## 2024-02-08 ASSESSMENT — LIFESTYLE VARIABLES
AUDIT-C TOTAL SCORE: 1
HOW OFTEN DO YOU HAVE A DRINK CONTAINING ALCOHOL: MONTHLY OR LESS
PRESCIPTION_ABUSE_PAST_12_MONTHS: NO
SUBSTANCE_ABUSE_PAST_12_MONTHS: NO
HOW OFTEN DO YOU HAVE 6 OR MORE DRINKS ON ONE OCCASION: NEVER
SKIP TO QUESTIONS 9-10: 1
HOW MANY STANDARD DRINKS CONTAINING ALCOHOL DO YOU HAVE ON A TYPICAL DAY: 1 OR 2
AUDIT-C TOTAL SCORE: 1

## 2024-02-08 ASSESSMENT — PAIN DESCRIPTION - LOCATION: LOCATION: FOOT

## 2024-02-08 ASSESSMENT — COLUMBIA-SUICIDE SEVERITY RATING SCALE - C-SSRS
6. HAVE YOU EVER DONE ANYTHING, STARTED TO DO ANYTHING, OR PREPARED TO DO ANYTHING TO END YOUR LIFE?: NO
1. IN THE PAST MONTH, HAVE YOU WISHED YOU WERE DEAD OR WISHED YOU COULD GO TO SLEEP AND NOT WAKE UP?: NO
2. HAVE YOU ACTUALLY HAD ANY THOUGHTS OF KILLING YOURSELF?: NO

## 2024-02-08 ASSESSMENT — ENCOUNTER SYMPTOMS
ABDOMINAL PAIN: 1
NUMBNESS: 1

## 2024-02-08 NOTE — PROGRESS NOTES
02/08/24 1105   Discharge Planning   Living Arrangements Other (Comment)  ([place])   Support Systems Friends/neighbors   Assistance Needed No   Type of Residence Nursing home/residential care   Do you have animals or pets at home? No   Who is requesting discharge planning? Provider   Home or Post Acute Services None   Patient expects to be discharged to: Return to \[facility]\""   Does the patient need discharge transport arranged? Yes   RoundTrip coordination needed? Yes   Has discharge transport been arranged? No   Financial Resource Strain   How hard is it for you to pay for the very basics like food, housing, medical care, and heating? Not hard   Housing Stability   In the last 12 months, was there a time when you were not able to pay the mortgage or rent on time? N   In the last 12 months, how many places have you lived? 1   In the last 12 months, was there a time when you did not have a steady place to sleep or slept in a shelter (including now)? N   Transportation Needs   In the past 12 months, has lack of transportation kept you from medical appointments or from getting medications? no   In the past 12 months, has lack of transportation kept you from meetings, work, or from getting things needed for daily living? No   Patient Choice   Provider Choice list and CMS website (https://medicare.gov/care-compare#search) for post-acute Quality and Resource Measure Data were provided and reviewed with: Patient   Patient / Family choosing to utilize agency / facility established prior to hospitalization Yes     Met with pt at the bedside and verified address, phone number and emergency contact information. PCP is Karthik last seen in January 2024 and pharmacy of use is [facility] . Pt is dependent with suprapubic catheter and lives at \[facility]\"" and feels safe. He states he wants to return there at discharge referral built and sent return to longterm care at \[facility]\"". ADOD is undetermined at this time. CT to follow. Keila  Stephanie BSN/RN-TCC

## 2024-02-08 NOTE — H&P
History Of Present Illness  Raheem Hilton is a 63 y.o. male presenting with mild lower abdominal pain as well as numbness of his extremities. Patient resides at [place], and was sent to [facility]. He was noted w/fever, tachycardia, leukocytosis and +UA. Patient transferred here as direct admit for management of sepsis 2/2 UTI. Patient has suprapubic catheter 2/2 neurogenic bladder and multiple hospitalizations for recurrent UTIs. Patient states he usually has numbness of his extremities when he has a UTI. Patient received 1L IVF and 1 dose of ceftriaxone at previous facility. Ucx pending. Patient has MS and is paraplegic. He has a chronic sacral ulcer w/daily dressing changes.      Past Medical History  He has a past medical history of Anemia, Bowel perforation (CMS/[facility]), Buttock wound, unspecified laterality, subsequent encounter, COPD (chronic obstructive pulmonary disease) (CMS/[facility]), Extended spectrum beta lactamase (ESBL) resistance, GERD (gastroesophageal reflux disease), Hyperlipidemia, Hypertension, Hypo-osmolality and hyponatremia, Insomnia, Multiple sclerosis (CMS/[facility]), Neurogenic bladder, Neuromuscular dysfunction of bladder, Personal history of transient ischemic attack (TIA), and cerebral infarction without residual deficits (08/06/2020), Pulmonary embolism (CMS/[facility]), Sepsis with encephalopathy (CMS/[facility]), Septic shock (CMS/[facility]), Simple febrile convulsions (CMS/[facility]), Status post administration of tPA (rtPA) in a different facility within the last 24 hours prior to admission to current facility (08/06/2020), and UTI (urinary tract infection).    Surgical History  He has a past surgical history that includes MR angio head wo IV contrast (6/29/2020) and MR angio neck wo IV contrast (6/29/2020).     Social History  He reports that he has never smoked. He has never used smokeless tobacco. No history on file for alcohol use and drug use.    Family History  No family history on file.      Allergies  Bactrim [sulfamethoxazole-trimethoprim], Cefepime, Doxycycline, and Heparin    Review of Systems   Gastrointestinal:  Positive for abdominal pain.   Neurological:  Positive for numbness.   All other systems reviewed and are negative.       Physical Exam  General Appearance: awake and alert, AAOx3, NAD  HEENT: nc/at, eomi, perrla, moist mucous membranes  Resp: ctab; no wheezing, rhonchi, or rales, normal respiratory effort  Cardio: rrr. S1s2  GI: soft, ntnd, BS+.   Ext: no edema, contracted LEs       Last Recorded Vitals  /84   Pulse 105   Temp 36.3 °C (97.3 °F)   Resp 16   Wt 81 kg (178 lb 9.2 oz)   SpO2 94%     Relevant Results  Results for orders placed or performed during the hospital encounter of 02/08/24 (from the past 24 hour(s))   CBC and Auto Differential   Result Value Ref Range    WBC 14.9 (H) 4.4 - 11.3 x10*3/uL    nRBC 0.0 0.0 - 0.0 /100 WBCs    RBC 4.71 4.50 - 5.90 x10*6/uL    Hemoglobin 14.3 13.5 - 17.5 g/dL    Hematocrit 42.7 41.0 - 52.0 %    MCV 91 80 - 100 fL    MCH 30.4 26.0 - 34.0 pg    MCHC 33.5 32.0 - 36.0 g/dL    RDW 13.9 11.5 - 14.5 %    Platelets 247 150 - 450 x10*3/uL    Neutrophils % 78.5 40.0 - 80.0 %    Immature Granulocytes %, Automated 0.3 0.0 - 0.9 %    Lymphocytes % 9.3 13.0 - 44.0 %    Monocytes % 9.7 2.0 - 10.0 %    Eosinophils % 1.9 0.0 - 6.0 %    Basophils % 0.3 0.0 - 2.0 %    Neutrophils Absolute 11.70 (H) 1.20 - 7.70 x10*3/uL    Immature Granulocytes Absolute, Automated 0.05 0.00 - 0.70 x10*3/uL    Lymphocytes Absolute 1.38 1.20 - 4.80 x10*3/uL    Monocytes Absolute 1.44 (H) 0.10 - 1.00 x10*3/uL    Eosinophils Absolute 0.28 0.00 - 0.70 x10*3/uL    Basophils Absolute 0.05 0.00 - 0.10 x10*3/uL   Comprehensive metabolic panel   Result Value Ref Range    Glucose 101 (H) 74 - 99 mg/dL    Sodium 130 (L) 136 - 145 mmol/L    Potassium 3.4 (L) 3.5 - 5.3 mmol/L    Chloride 101 98 - 107 mmol/L    Bicarbonate 21 21 - 32 mmol/L    Anion Gap 11 10 - 20 mmol/L    Urea  Nitrogen 15 6 - 23 mg/dL    Creatinine 0.37 (L) 0.50 - 1.30 mg/dL    eGFR >90 >60 mL/min/1.73m*2    Calcium 8.6 8.6 - 10.3 mg/dL    Albumin 3.8 3.4 - 5.0 g/dL    Alkaline Phosphatase 122 33 - 136 U/L    Total Protein 6.9 6.4 - 8.2 g/dL    AST 15 9 - 39 U/L    Bilirubin, Total 0.3 0.0 - 1.2 mg/dL    ALT 19 10 - 52 U/L     Scheduled medications  atorvastatin, 80 mg, oral, Nightly  baclofen, 10 mg, oral, BID  carBAMazepine, 200 mg, oral, BID  cefTRIAXone, 1 g, intravenous, q24h  gabapentin, 600 mg, oral, TID  lactobacillus acidophilus, 1 capsule, oral, TID  methIMAzole, , ,   methIMAzole, 5 mg, oral, q8h CARMELO  metoprolol succinate XL, 25 mg, oral, Daily  polyethylene glycol, 17 g, oral, Daily  potassium chloride CR, 40 mEq, oral, Once      Continuous medications  sodium chloride 0.9%, 50 mL/hr      PRN medications  PRN medications: acetaminophen **OR** acetaminophen **OR** acetaminophen, acetaminophen **OR** acetaminophen **OR** acetaminophen, albuterol, HYDROcodone-acetaminophen, methIMAzole, ondansetron ODT **OR** ondansetron               Assessment/Plan   Principal Problem:    Sepsis due to urinary tract infection (CMS/[facility])      62 y/o M w/PMH of MS, paraplegia, neurogenic bladder w/suprapubic catheter, chronic sacral decubitus ulcer, recurrent UTIs, hyperthyroidism, HTN, HLD presenting as direct admit for management of UTI.    Recurrent UTI  Neurogenic bladder w/suprapubic catheter  Uncontrolled HTN  Chronic hyponatremia  MS  Chronic sacral decubitus ulcer  Hyperthyroidism    Patient nontoxic appearing; remains afebrile here with downtrending leukocytosis. Continue IV rocephin. UA and ucx resent; although patient has already received 1 dose of rocephin.  Recent admissions show negative cultures  Stop IVF, diet initiated  BP elevated but improving  Home medications resumed; patient denies recent medication changes  Wound care consult for sacral ulcer  DVT ppx w/SCDs    DNR CCA    Emir Zelaya MD

## 2024-02-09 LAB
ALBUMIN SERPL BCP-MCNC: 3.4 G/DL (ref 3.4–5)
ALP SERPL-CCNC: 95 U/L (ref 33–136)
ALT SERPL W P-5'-P-CCNC: 16 U/L (ref 10–52)
ANION GAP SERPL CALC-SCNC: 11 MMOL/L (ref 10–20)
AST SERPL W P-5'-P-CCNC: 14 U/L (ref 9–39)
BILIRUB SERPL-MCNC: 0.3 MG/DL (ref 0–1.2)
BUN SERPL-MCNC: 10 MG/DL (ref 6–23)
CALCIUM SERPL-MCNC: 8.1 MG/DL (ref 8.6–10.3)
CHLORIDE SERPL-SCNC: 101 MMOL/L (ref 98–107)
CO2 SERPL-SCNC: 22 MMOL/L (ref 21–32)
CREAT SERPL-MCNC: 0.44 MG/DL (ref 0.5–1.3)
EGFRCR SERPLBLD CKD-EPI 2021: >90 ML/MIN/1.73M*2
ERYTHROCYTE [DISTWIDTH] IN BLOOD BY AUTOMATED COUNT: 14.5 % (ref 11.5–14.5)
GLUCOSE SERPL-MCNC: 96 MG/DL (ref 74–99)
HCT VFR BLD AUTO: 38.8 % (ref 41–52)
HGB BLD-MCNC: 12.6 G/DL (ref 13.5–17.5)
MCH RBC QN AUTO: 29.8 PG (ref 26–34)
MCHC RBC AUTO-ENTMCNC: 32.5 G/DL (ref 32–36)
MCV RBC AUTO: 92 FL (ref 80–100)
NRBC BLD-RTO: 0 /100 WBCS (ref 0–0)
PLATELET # BLD AUTO: 244 X10*3/UL (ref 150–450)
POTASSIUM SERPL-SCNC: 3.9 MMOL/L (ref 3.5–5.3)
PROT SERPL-MCNC: 6.1 G/DL (ref 6.4–8.2)
RBC # BLD AUTO: 4.23 X10*6/UL (ref 4.5–5.9)
SODIUM SERPL-SCNC: 130 MMOL/L (ref 136–145)
WBC # BLD AUTO: 9.8 X10*3/UL (ref 4.4–11.3)

## 2024-02-09 PROCEDURE — 80053 COMPREHEN METABOLIC PANEL: CPT | Performed by: NURSE PRACTITIONER

## 2024-02-09 PROCEDURE — 1200000002 HC GENERAL ROOM WITH TELEMETRY DAILY

## 2024-02-09 PROCEDURE — 36415 COLL VENOUS BLD VENIPUNCTURE: CPT | Performed by: NURSE PRACTITIONER

## 2024-02-09 PROCEDURE — 99232 SBSQ HOSP IP/OBS MODERATE 35: CPT | Performed by: NURSE PRACTITIONER

## 2024-02-09 PROCEDURE — 2500000004 HC RX 250 GENERAL PHARMACY W/ HCPCS (ALT 636 FOR OP/ED): Performed by: NURSE PRACTITIONER

## 2024-02-09 PROCEDURE — 85027 COMPLETE CBC AUTOMATED: CPT | Performed by: NURSE PRACTITIONER

## 2024-02-09 PROCEDURE — 2500000001 HC RX 250 WO HCPCS SELF ADMINISTERED DRUGS (ALT 637 FOR MEDICARE OP): Performed by: NURSE PRACTITIONER

## 2024-02-09 PROCEDURE — 2500000004 HC RX 250 GENERAL PHARMACY W/ HCPCS (ALT 636 FOR OP/ED): Performed by: STUDENT IN AN ORGANIZED HEALTH CARE EDUCATION/TRAINING PROGRAM

## 2024-02-09 PROCEDURE — 94640 AIRWAY INHALATION TREATMENT: CPT

## 2024-02-09 PROCEDURE — 2500000001 HC RX 250 WO HCPCS SELF ADMINISTERED DRUGS (ALT 637 FOR MEDICARE OP): Performed by: STUDENT IN AN ORGANIZED HEALTH CARE EDUCATION/TRAINING PROGRAM

## 2024-02-09 PROCEDURE — 2500000002 HC RX 250 W HCPCS SELF ADMINISTERED DRUGS (ALT 637 FOR MEDICARE OP, ALT 636 FOR OP/ED): Performed by: STUDENT IN AN ORGANIZED HEALTH CARE EDUCATION/TRAINING PROGRAM

## 2024-02-09 RX ORDER — MULTIVIT-MIN/IRON FUM/FOLIC AC 7.5 MG-4
1 TABLET ORAL DAILY
Status: DISCONTINUED | OUTPATIENT
Start: 2024-02-09 | End: 2024-02-10 | Stop reason: HOSPADM

## 2024-02-09 RX ORDER — HYDRALAZINE HYDROCHLORIDE 20 MG/ML
5 INJECTION INTRAMUSCULAR; INTRAVENOUS ONCE
Status: COMPLETED | OUTPATIENT
Start: 2024-02-09 | End: 2024-02-09

## 2024-02-09 RX ORDER — ZINC SULFATE 50(220)MG
50 CAPSULE ORAL DAILY
Status: DISCONTINUED | OUTPATIENT
Start: 2024-02-09 | End: 2024-02-10 | Stop reason: HOSPADM

## 2024-02-09 RX ORDER — ASCORBIC ACID 500 MG
500 TABLET ORAL DAILY
Status: DISCONTINUED | OUTPATIENT
Start: 2024-02-09 | End: 2024-02-10 | Stop reason: HOSPADM

## 2024-02-09 RX ADMIN — Medication 1 CAPSULE: at 15:50

## 2024-02-09 RX ADMIN — Medication 1 CAPSULE: at 09:59

## 2024-02-09 RX ADMIN — Medication 1 TABLET: at 11:25

## 2024-02-09 RX ADMIN — CEFTRIAXONE SODIUM 1 G: 1 INJECTION, SOLUTION INTRAVENOUS at 20:42

## 2024-02-09 RX ADMIN — GABAPENTIN 600 MG: 300 CAPSULE ORAL at 09:59

## 2024-02-09 RX ADMIN — ATORVASTATIN CALCIUM 80 MG: 80 TABLET, FILM COATED ORAL at 20:45

## 2024-02-09 RX ADMIN — ALBUTEROL SULFATE 2.5 MG: 2.5 SOLUTION RESPIRATORY (INHALATION) at 18:40

## 2024-02-09 RX ADMIN — METHIMAZOLE 5 MG: 5 TABLET ORAL at 06:45

## 2024-02-09 RX ADMIN — CARBAMAZEPINE 200 MG: 200 TABLET ORAL at 20:44

## 2024-02-09 RX ADMIN — Medication 1 CAPSULE: at 20:45

## 2024-02-09 RX ADMIN — METOPROLOL SUCCINATE 25 MG: 25 TABLET, EXTENDED RELEASE ORAL at 09:59

## 2024-02-09 RX ADMIN — METHIMAZOLE 5 MG: 5 TABLET ORAL at 15:50

## 2024-02-09 RX ADMIN — GABAPENTIN 600 MG: 300 CAPSULE ORAL at 15:50

## 2024-02-09 RX ADMIN — BACLOFEN 10 MG: 10 TABLET ORAL at 20:45

## 2024-02-09 RX ADMIN — OXYCODONE HYDROCHLORIDE AND ACETAMINOPHEN 500 MG: 500 TABLET ORAL at 11:25

## 2024-02-09 RX ADMIN — GABAPENTIN 600 MG: 300 CAPSULE ORAL at 20:45

## 2024-02-09 RX ADMIN — HYDROCODONE BITARTRATE AND ACETAMINOPHEN 1 TABLET: 5; 325 TABLET ORAL at 21:30

## 2024-02-09 RX ADMIN — HYDRALAZINE HYDROCHLORIDE 5 MG: 20 INJECTION INTRAMUSCULAR; INTRAVENOUS at 18:24

## 2024-02-09 RX ADMIN — BACLOFEN 10 MG: 10 TABLET ORAL at 09:59

## 2024-02-09 RX ADMIN — METHIMAZOLE 5 MG: 5 TABLET ORAL at 21:25

## 2024-02-09 RX ADMIN — CARBAMAZEPINE 200 MG: 200 TABLET ORAL at 09:59

## 2024-02-09 RX ADMIN — ZINC SULFATE 220 MG (50 MG) CAPSULE 50 MG OF ELEMENTAL ZINC: CAPSULE at 11:24

## 2024-02-09 ASSESSMENT — PAIN DESCRIPTION - LOCATION: LOCATION: FOOT

## 2024-02-09 ASSESSMENT — PAIN SCALES - GENERAL
PAINLEVEL_OUTOF10: 0 - NO PAIN
PAINLEVEL_OUTOF10: 4
PAINLEVEL_OUTOF10: 1
PAINLEVEL_OUTOF10: 4

## 2024-02-09 ASSESSMENT — PAIN DESCRIPTION - ORIENTATION: ORIENTATION: RIGHT;LEFT

## 2024-02-09 ASSESSMENT — PAIN - FUNCTIONAL ASSESSMENT
PAIN_FUNCTIONAL_ASSESSMENT: 0-10

## 2024-02-09 NOTE — PROGRESS NOTES
Met with pt at the bedside reviewed the plan for discharge, sent updates  to EUGENE. Reviewed the IMM signed and dated,copy provided original on chart. Pt deniesfurther needs at this time. ADOD 24hrs. AMAP 15/14. CT to follow. Keila Brown BSN/RN-TCC

## 2024-02-09 NOTE — CONSULTS
"Nutrition Initial Assessment:   Nutrition Assessment    Reason for Assessment: Provider consult order    Patient is a 63 y.o. male presenting with: Sepsis, UTI.       Nutrition History:  Energy Intake: Good > 75 %  Food and Nutrient History: Stated appetite currently decreased some, usually eats well at [facility] 3 meals/day.  Does receive 1 protein supplement/day and would like while here  Vitamin/Herbal Supplement Use: He is unsure - but thinks he gets MVI  Food Allergies/Intolerances:  None  GI Symptoms: None  Oral Problems: None       Anthropometrics:  Height: 185.5 cm (6' 1.03\")   Weight: 81 kg (178 lb 9.2 oz)   BMI (Calculated): 23.54  IBW/kg (Dietitian Calculated): 71 kg  Percent of IBW: 114 %       Weight History:     Weight Change %:  Weight History / % Weight Change: gain 8.6 kg 10 months  Significant Weight Loss: No  Significant Weight Gain: Non-fluid related    Nutrition Focused Physical Exam Findings:    Subcutaneous Fat Loss:   Orbital Fat Pads: Well nourshed (slightly bulging fat pads)  Buccal Fat Pads: Well nourished (full, rounded cheeks)  Triceps: Well nourished (ample fat tissue)  Ribs: Well nourished (full chest, ribs do not protrude)  Muscle Wasting:  Temporalis: Well nourished (well-defined muscle)  Pectoralis (Clavicular Region): Well nourished (clavicle not visible)  Deltoid/Trapezius: Well nourished (rounded appearance at arm, shoulder, neck)  Interosseous: Well nourished (muscle bulges)  Trapezius/Infraspinatus/Supraspinatus (Scapular Region): Well nourished (bones not prominent, muscle taut)  Quadriceps: Defer (paraplegic)  Gastrocnemius: Defer (paraplegic)  Edema:  Edema: none  Physical Findings:  Hair: Negative  Eyes: Negative  Mouth: Negative  Nails: Negative  Skin: Positive (stage 2 pressure injury sacrum)    Nutrition Significant Labs:  CBC Trend:   Results from last 7 days   Lab Units 02/09/24  0536 02/08/24  0310   WBC AUTO x10*3/uL 9.8 14.9*   RBC AUTO x10*6/uL 4.23* 4.71   HEMOGLOBIN " g/dL 12.6* 14.3   HEMATOCRIT % 38.8* 42.7   MCV fL 92 91   PLATELETS AUTO x10*3/uL 244 247    , Renal Lab Trend:   Results from last 7 days   Lab Units 02/09/24  0536 02/08/24  0311   POTASSIUM mmol/L 3.9 3.4*   SODIUM mmol/L 130* 130*   EGFR mL/min/1.73m*2 >90 >90   BUN mg/dL 10 15   CREATININE mg/dL 0.44* 0.37*    , Vit D:   Lab Results   Component Value Date    VITD25 57 10/06/2022    , Iron Panel:   Lab Results   Component Value Date    IRON 42 10/06/2022    TIBC 199 (L) 10/06/2022    FERRITIN 131 10/06/2022        Nutrition Specific Medications:  Scheduled medications  ascorbic acid, 500 mg, oral, Daily  atorvastatin, 80 mg, oral, Nightly  lactobacillus acidophilus, 1 capsule, oral, TID  multivitamin with minerals, 1 tablet, oral, Daily  polyethylene glycol, 17 g, oral, Daily  zinc sulfate, 50 mg of elemental zinc, oral, Daily      I/O:   Last BM Date: 02/08/24; Stool Appearance: Loose (02/08/24 2123)        Dietary Orders (From admission, onward)       Start     Ordered    02/09/24 1127  Oral nutritional supplements  Until discontinued        Comments: Vanilla   Question Answer Comment   Deliver with Lunch    Select supplement: Ensure Plus High Protein        02/09/24 1126    02/08/24 0240  May Participate in Room Service  Once        Question:  .  Answer:  Yes    02/08/24 0241    02/08/24 0228  Adult diet 2-3 grams sodium  Diet effective now        Question:  Diet type  Answer:  2-3 grams sodium    02/08/24 0236                     Estimated Needs:   Total Energy Estimated Needs (kCal): 2187 kCal  Method for Estimating Needs: 25-30 kcal/kg  Total Protein Estimated Needs (g): 105 g  Method for Estimating Needs: 1.25-1.5 gm/kg  Total Fluid Estimated Needs (mL): 2430 mL           Nutrition Diagnosis   Malnutrition Diagnosis  Patient has Malnutrition Diagnosis: No    Nutrition Diagnosis  Patient has Nutrition Diagnosis: Yes  Diagnosis Status (1): New  Nutrition Diagnosis 1: Increased nutrient needs  Related to  (1): healing process  As Evidenced by (1): Chronic Stage 2 pressure injury sacrum       Nutrition Interventions/Recommendations         Nutrition Prescription:  Individualized Nutrition Prescription Provided for : Provide 2-3 gram sodium diet with Ensure plus HP x 1 = 350 kcal, 20 gm protein.  Request order for Vitamin C, MVI, Zinc for wound healing        Nutrition Interventions:   Interventions: Meals and snacks, Medical food supplement, Vitamin supplement therapy, Mineral supplement therapy  Meals and Snacks: Mineral-modified diet  Goal: Provide diet ordered  Medical Food Supplement: Commercial beverage  Goal: Provide Ensure plus HP x 1/day  Vitamin Supplement Therapy: C  Goal: Provide 500 mg Vitamin C for wound healing  Mineral Supplement Therapy: Zinc  Goal: Provide 50 gm Zinc/day for wound healing  Additional Interventions: MVI ordered    Collaboration and Referral of Nutrition Care: Collaboration by nutrition professional with other providers  Goal: Usha MCBRIDE    Nutrition Education:  Defer - patient resides at [facility]         Nutrition Monitoring and Evaluation   Food/Nutrient Related History Monitoring  Monitoring and Evaluation Plan: Amount of food  Amount of Food: Estimated amout of food, Medical food intake  Criteria: Will consume >75% meals  Additional Plans: Will consume Ensure plus HP    Body Composition/Growth/Weight History  Monitoring and Evaluation Plan: Weight  Weight: Measured weight  Criteria: Maintain weight         Nutrition Focused Physical Findings  Monitoring and Evaluation Plan: Skin  Skin: Impaired wound healing  Criteria: Signs of wound healing       Time Spent/Follow-up Reminder:   Time Spent (min): 45 minutes  Last Date of Nutrition Visit: 02/09/24  Nutrition Follow-Up Needed?: 3-8 days    Dhara Hairston RDN, LD

## 2024-02-09 NOTE — PROGRESS NOTES
Raheem Hilton is a 63 y.o. male on day 1 of admission presenting with Sepsis due to urinary tract infection (CMS/[facility]).      Subjective   Pt lying in bed, he states that he is feeling much better today, he remains alert and oriented x3 no signs of distress noted. He denies any pain at this time        Objective     Last Recorded Vitals  /78   Pulse 69   Temp 36 °C (96.8 °F)   Resp 18   Wt 81 kg (178 lb 9.2 oz)   SpO2 95%   Intake/Output last 3 Shifts:    Intake/Output Summary (Last 24 hours) at 2/9/2024 1053  Last data filed at 2/9/2024 0928  Gross per 24 hour   Intake 1020 ml   Output 750 ml   Net 270 ml       Admission Weight  Weight: 81 kg (178 lb 9.2 oz) (02/08/24 0118)    Daily Weight  02/08/24 : 81 kg (178 lb 9.2 oz)    Image Results  ECG 12 Lead  Sinus tachycardia  Nonspecific ST abnormality  Abnormal ECG  When compared with ECG of 26-SEP-2023 19:37,  Previous ECG has undetermined rhythm, needs review  ST no longer elevated in Inferior leads  ST now depressed in Anterolateral leads  T wave inversion now evident in Inferior leads  See ED provider note for full interpretation and clinical correlation  Confirmed by Bull Moon ([id number]) on 1/10/2024 2:48:07 PM      Physical Exam  General Appearance: AAO x 3, not in acute distress  Skin: skin color pink, warm, and dry  Eyes : PERRL, EOM's intact  ENT: mucous membranes pink and moist  Head: normocephalic, atraumatic   Respiratory: lungs clear to auscultation anteriorly; no wheezing, rhonchi, or crackles  Heart: regular rate and rhythm. telemetry shows sinus rhythm   Abdomen: Nondistended, positive bowel sounds x4, soft,  suprapubic cath clear yellow urine with sediment   Extremities: contracted laurie lower ext  Peripheral pulses: normal x4 extremities  Neuro: alert, coherent and conversant, no focal motor deficits      Relevant Results  Scheduled medications  atorvastatin, 80 mg, oral, Nightly  baclofen, 10 mg, oral, BID  carBAMazepine, 200 mg, oral,  BID  cefTRIAXone, 1 g, intravenous, q24h  gabapentin, 600 mg, oral, TID  lactobacillus acidophilus, 1 capsule, oral, TID  methIMAzole, 5 mg, oral, q8h CARMELO  metoprolol succinate XL, 25 mg, oral, Daily  polyethylene glycol, 17 g, oral, Daily      Continuous medications     PRN medications  PRN medications: acetaminophen **OR** acetaminophen **OR** acetaminophen, acetaminophen **OR** acetaminophen **OR** acetaminophen, albuterol, HYDROcodone-acetaminophen, ondansetron ODT **OR** ondansetron  Results for orders placed or performed during the hospital encounter of 02/08/24 (from the past 24 hour(s))   CBC   Result Value Ref Range    WBC 9.8 4.4 - 11.3 x10*3/uL    nRBC 0.0 0.0 - 0.0 /100 WBCs    RBC 4.23 (L) 4.50 - 5.90 x10*6/uL    Hemoglobin 12.6 (L) 13.5 - 17.5 g/dL    Hematocrit 38.8 (L) 41.0 - 52.0 %    MCV 92 80 - 100 fL    MCH 29.8 26.0 - 34.0 pg    MCHC 32.5 32.0 - 36.0 g/dL    RDW 14.5 11.5 - 14.5 %    Platelets 244 150 - 450 x10*3/uL   Comprehensive metabolic panel   Result Value Ref Range    Glucose 96 74 - 99 mg/dL    Sodium 130 (L) 136 - 145 mmol/L    Potassium 3.9 3.5 - 5.3 mmol/L    Chloride 101 98 - 107 mmol/L    Bicarbonate 22 21 - 32 mmol/L    Anion Gap 11 10 - 20 mmol/L    Urea Nitrogen 10 6 - 23 mg/dL    Creatinine 0.44 (L) 0.50 - 1.30 mg/dL    eGFR >90 >60 mL/min/1.73m*2    Calcium 8.1 (L) 8.6 - 10.3 mg/dL    Albumin 3.4 3.4 - 5.0 g/dL    Alkaline Phosphatase 95 33 - 136 U/L    Total Protein 6.1 (L) 6.4 - 8.2 g/dL    AST 14 9 - 39 U/L    Bilirubin, Total 0.3 0.0 - 1.2 mg/dL    ALT 16 10 - 52 U/L           Assessment/Plan        Principal Problem:    Sepsis due to urinary tract infection (CMS/[facility])    Raheem Hilton is a 63 y.o. male presenting with mild lower abdominal pain as well as numbness of his extremities. Patient resides at [place], and was sent to [facility]. He was noted w/fever, tachycardia, leukocytosis and +UA. Patient transferred here as direct admit for management  of sepsis 2/2 UTI. Patient has suprapubic catheter 2/2 neurogenic bladder and multiple hospitalizations for recurrent UTIs. Patient states he usually has numbness of his extremities when he has a UTI. Patient received 1L IVF and 1 dose of ceftriaxone at previous facility. Ucx pending. Patient has MS and is paraplegic. He has a chronic sacral ulcer w/daily dressing changes.      #UTI  #HX of neurogenic bladder with suprapubic cath   -UA moderate leukest  -awaiting urine culture  -Leukocytosis 14.9, down to 9.8  -continue iv Rocephin    #Uncontrolled HTN  -continue home medication     #HX sacral Ulcer  -wound care consulted          Malnutrition          I agree with the dietitian's malnutrition diagnosis.         SANJUANA Bryant-CNP

## 2024-02-10 VITALS
RESPIRATION RATE: 19 BRPM | BODY MASS INDEX: 23.67 KG/M2 | TEMPERATURE: 97.7 F | SYSTOLIC BLOOD PRESSURE: 123 MMHG | HEART RATE: 76 BPM | DIASTOLIC BLOOD PRESSURE: 78 MMHG | HEIGHT: 73 IN | OXYGEN SATURATION: 9 % | WEIGHT: 178.57 LBS

## 2024-02-10 PROBLEM — N39.0 SEPSIS DUE TO URINARY TRACT INFECTION (MULTI): Status: RESOLVED | Noted: 2024-02-08 | Resolved: 2024-02-10

## 2024-02-10 PROBLEM — A41.9 SEPSIS DUE TO URINARY TRACT INFECTION (MULTI): Status: RESOLVED | Noted: 2024-02-08 | Resolved: 2024-02-10

## 2024-02-10 LAB
ALBUMIN SERPL BCP-MCNC: 3.5 G/DL (ref 3.4–5)
ALP SERPL-CCNC: 96 U/L (ref 33–136)
ALT SERPL W P-5'-P-CCNC: 20 U/L (ref 10–52)
ANION GAP SERPL CALC-SCNC: 12 MMOL/L (ref 10–20)
AST SERPL W P-5'-P-CCNC: 17 U/L (ref 9–39)
BACTERIA UR CULT: ABNORMAL
BILIRUB SERPL-MCNC: 0.2 MG/DL (ref 0–1.2)
BUN SERPL-MCNC: 10 MG/DL (ref 6–23)
CALCIUM SERPL-MCNC: 8.4 MG/DL (ref 8.6–10.3)
CHLORIDE SERPL-SCNC: 100 MMOL/L (ref 98–107)
CO2 SERPL-SCNC: 22 MMOL/L (ref 21–32)
CREAT SERPL-MCNC: 0.4 MG/DL (ref 0.5–1.3)
EGFRCR SERPLBLD CKD-EPI 2021: >90 ML/MIN/1.73M*2
ERYTHROCYTE [DISTWIDTH] IN BLOOD BY AUTOMATED COUNT: 14.1 % (ref 11.5–14.5)
GLUCOSE SERPL-MCNC: 96 MG/DL (ref 74–99)
HCT VFR BLD AUTO: 41 % (ref 41–52)
HGB BLD-MCNC: 13.2 G/DL (ref 13.5–17.5)
MCH RBC QN AUTO: 29.5 PG (ref 26–34)
MCHC RBC AUTO-ENTMCNC: 32.2 G/DL (ref 32–36)
MCV RBC AUTO: 92 FL (ref 80–100)
NRBC BLD-RTO: 0 /100 WBCS (ref 0–0)
PLATELET # BLD AUTO: 273 X10*3/UL (ref 150–450)
POTASSIUM SERPL-SCNC: 3.9 MMOL/L (ref 3.5–5.3)
PROT SERPL-MCNC: 6.3 G/DL (ref 6.4–8.2)
RBC # BLD AUTO: 4.48 X10*6/UL (ref 4.5–5.9)
SARS-COV-2 RNA RESP QL NAA+PROBE: NOT DETECTED
SODIUM SERPL-SCNC: 130 MMOL/L (ref 136–145)
WBC # BLD AUTO: 7.8 X10*3/UL (ref 4.4–11.3)

## 2024-02-10 PROCEDURE — 99239 HOSP IP/OBS DSCHRG MGMT >30: CPT | Performed by: HOSPITALIST

## 2024-02-10 PROCEDURE — 85027 COMPLETE CBC AUTOMATED: CPT | Performed by: NURSE PRACTITIONER

## 2024-02-10 PROCEDURE — 84075 ASSAY ALKALINE PHOSPHATASE: CPT | Performed by: NURSE PRACTITIONER

## 2024-02-10 PROCEDURE — 36415 COLL VENOUS BLD VENIPUNCTURE: CPT | Performed by: NURSE PRACTITIONER

## 2024-02-10 PROCEDURE — 87635 SARS-COV-2 COVID-19 AMP PRB: CPT | Performed by: HOSPITALIST

## 2024-02-10 PROCEDURE — 2500000004 HC RX 250 GENERAL PHARMACY W/ HCPCS (ALT 636 FOR OP/ED): Performed by: STUDENT IN AN ORGANIZED HEALTH CARE EDUCATION/TRAINING PROGRAM

## 2024-02-10 PROCEDURE — 2500000001 HC RX 250 WO HCPCS SELF ADMINISTERED DRUGS (ALT 637 FOR MEDICARE OP): Performed by: STUDENT IN AN ORGANIZED HEALTH CARE EDUCATION/TRAINING PROGRAM

## 2024-02-10 PROCEDURE — 2500000001 HC RX 250 WO HCPCS SELF ADMINISTERED DRUGS (ALT 637 FOR MEDICARE OP): Performed by: NURSE PRACTITIONER

## 2024-02-10 RX ORDER — CEPHALEXIN 500 MG/1
500 CAPSULE ORAL 3 TIMES DAILY
Start: 2024-02-10 | End: 2024-04-23 | Stop reason: HOSPADM

## 2024-02-10 RX ADMIN — METOPROLOL SUCCINATE 25 MG: 25 TABLET, EXTENDED RELEASE ORAL at 09:13

## 2024-02-10 RX ADMIN — Medication 1 CAPSULE: at 09:13

## 2024-02-10 RX ADMIN — BACLOFEN 10 MG: 10 TABLET ORAL at 09:13

## 2024-02-10 RX ADMIN — Medication 1 TABLET: at 09:13

## 2024-02-10 RX ADMIN — CARBAMAZEPINE 200 MG: 200 TABLET ORAL at 09:13

## 2024-02-10 RX ADMIN — METHIMAZOLE 5 MG: 5 TABLET ORAL at 06:36

## 2024-02-10 RX ADMIN — POLYETHYLENE GLYCOL 3350 17 G: 17 POWDER, FOR SOLUTION ORAL at 09:13

## 2024-02-10 RX ADMIN — GABAPENTIN 600 MG: 300 CAPSULE ORAL at 09:13

## 2024-02-10 RX ADMIN — ZINC SULFATE 220 MG (50 MG) CAPSULE 50 MG OF ELEMENTAL ZINC: CAPSULE at 09:13

## 2024-02-10 RX ADMIN — OXYCODONE HYDROCHLORIDE AND ACETAMINOPHEN 500 MG: 500 TABLET ORAL at 09:13

## 2024-02-10 ASSESSMENT — COGNITIVE AND FUNCTIONAL STATUS - GENERAL
TURNING FROM BACK TO SIDE WHILE IN FLAT BAD: A LOT
TOILETING: TOTAL
PERSONAL GROOMING: A LITTLE
DRESSING REGULAR LOWER BODY CLOTHING: TOTAL
MOVING TO AND FROM BED TO CHAIR: TOTAL
EATING MEALS: A LITTLE
DRESSING REGULAR UPPER BODY CLOTHING: TOTAL
CLIMB 3 TO 5 STEPS WITH RAILING: TOTAL
WALKING IN HOSPITAL ROOM: TOTAL
DAILY ACTIVITIY SCORE: 10
MOBILITY SCORE: 8
HELP NEEDED FOR BATHING: TOTAL
STANDING UP FROM CHAIR USING ARMS: TOTAL
MOVING FROM LYING ON BACK TO SITTING ON SIDE OF FLAT BED WITH BEDRAILS: A LOT

## 2024-02-10 ASSESSMENT — PAIN SCALES - GENERAL: PAINLEVEL_OUTOF10: 0 - NO PAIN

## 2024-02-10 ASSESSMENT — PAIN - FUNCTIONAL ASSESSMENT: PAIN_FUNCTIONAL_ASSESSMENT: 0-10

## 2024-02-10 NOTE — CARE PLAN
Met pt at bedside, role of TCC explained. Pt confirms his plan to return to [facility]. Per care rounds pt is ready today. [facility] [place] [facility]. Called [facility] is on call for admissions. Called [facility] who confirms need for [facility] and reports that they can take patient back today. Notified Roma Forrest DSC of discharge as well. CT will follow.     1205- Neg COVID result sent to [facility] via [facility]. Floor  set up transport for 1230pm. DSC sent all other discharge documents. Notified [facility] of pick-up time. CT will follow.

## 2024-02-10 NOTE — DISCHARGE SUMMARY
Discharge Diagnosis  Sepsis due to urinary tract infection (CMS/HCC), resolved  UTI due to indwelling catheter  Hypertension  History of sacral ulcer  History of neurogenic bladder  Discharge Meds     Your medication list        START taking these medications        Instructions Last Dose Given Next Dose Due   cephalexin 500 mg capsule  Commonly known as: Keflex      Take 1 capsule (500 mg) by mouth 3 times a day.              CONTINUE taking these medications        Instructions Last Dose Given Next Dose Due   acetaminophen 325 mg tablet  Commonly known as: Tylenol      Take 2 tablets (650 mg) by mouth every 4 hours if needed for moderate pain (4 - 6).       albuterol 2.5 mg /3 mL (0.083 %) nebulizer solution      Take 3 mL (2.5 mg) by nebulization every 6 hours if needed for wheezing.       atorvastatin 80 mg tablet  Commonly known as: Lipitor           baclofen 10 mg tablet  Commonly known as: Lioresal           carBAMazepine 200 mg tablet  Commonly known as: TEGretol           gabapentin 300 mg capsule  Commonly known as: Neurontin           HYDROcodone-acetaminophen 5-325 mg tablet  Commonly known as: Norco           lactobacillus acidophilus capsule           methIMAzole 5 mg tablet  Commonly known as: Tapazole           metoprolol succinate XL 25 mg 24 hr tablet  Commonly known as: Toprol-XL                     Where to Get Your Medications        Information about where to get these medications is not yet available    Ask your nurse or doctor about these medications  cephalexin 500 mg capsule         Test Results Pending At Discharge  Pending Labs       No current pending labs.            Hospital Course   63 y.o. male presenting with mild lower abdominal pain as well as numbness of his extremities. Patient resides at [place], and was sent to [facility]. He was noted w/fever, tachycardia, leukocytosis and +UA. Patient transferred here as direct admit for management of sepsis 2/2 UTI.  Patient has suprapubic catheter 2/2 neurogenic bladder and multiple hospitalizations for recurrent UTIs. Patient states he usually has numbness of his extremities when he has a UTI. Patient received 1L IVF and 1 dose of ceftriaxone at previous facility. Ucx pending. Patient has MS and is paraplegic. He has a chronic sacral ulcer w/daily dressing changes.   Patient clinically improved with IV Rocephin, IV fluids.  Urine cultures again shows mixed ary, probable contamination which also was on his previous urinary cultures.  Suprapubic catheter has been changed prior to discharge to [facility].  Will put him on cephalexin 500 mg p.o. 3 times daily for 10 days, continue with probiotics.  Advised proper catheter care at the nursing home.  He came with initial sepsis, resolved.  His other chronic medical conditions remained to be stable.  Patient acknowledges discharge instructions, medications and follow-up.  Catheter care.    Pertinent Physical Exam At Time of Discharge  Physical Exam  General Appearance: AAO x 3, not in acute distress  Skin: skin color pink, warm, and dry  Eyes : PERRL, EOM's intact  ENT: mucous membranes pink and moist  Head: normocephalic, atraumatic   Respiratory: lungs clear to auscultation anteriorly; no wheezing, rhonchi, or crackles  Heart: regular rate and rhythm. telemetry shows sinus rhythm   Abdomen: Nondistended, positive bowel sounds x4, soft,  suprapubic cath clear yellow urine with sediment   Extremities: contracted laurie lower ext  Peripheral pulses: normal x4 extremities  Neuro: alert, coherent and conversant, lower leg paraplegia.  Follow-Up  No future appointments.  Total discharge time 35 minutes.    Sarahi Deleon MD

## 2024-02-10 NOTE — DISCHARGE INSTRUCTIONS
Urinary Tract Infection Discharge Instructions, Adult    About this topic  A urinary tract infection is a UTI. It is caused by germs getting into the urinary tract. The urinary tract is made up of the kidneys, ureters, bladder, and urethra. The urethra is a tube at the bottom of the bladder. Urine flows out of this tube. The germs enter the urethra and then spread in the bladder. The ureters are small tubes that join the bladder and the kidneys. Most UTIs are infections in either your bladder or your kidneys. Bladder infections are more common and may also be called cystitis. Kidney infections are more serious and may also be called pyelonephritis.    What care is needed at home?  Ask your doctor what you need to do when you go home. Make sure you ask questions if you do not understand what the doctor says. This way you will know what you need to do.  Take your drugs as ordered by your doctor.  Unless your doctor has told you otherwise, drink at least 8 to 10 glasses of water or water-based drinks each day. Do not include drinks with caffeine, like coffee or tea.  Do not hold back your urine. Go to the bathroom every 2 to 3 hours.  What follow-up care is needed?  Your doctor may ask you to make visits to the office to check on your progress. Be sure to keep these visits.  What drugs may be needed?  The doctor may order drugs to:  Fight an infection  Help with pain  Numb your bladder  Help you pass your urine more easily  Be sure to talk to your doctor about all of your drugs if you are pregnant.  Will physical activity be limited?  Physical activities will not be limited. You may have to pass urine more often.  What changes to diet are needed?  Do not drink beer, wine, and mixed drinks (alcohol) or caffeine. These can bother the bladder.  Talk to your doctor about drinking cranberry juice.  What can be done to prevent this health problem?  Gently cleanse your genital area each day. Wipe from front to back to keep  germs from going in your body.  If your penis is uncircumcised, retract the foreskin and gently clean around the head of the penis each day.  Consider other methods of birth control instead of a spermicide.  Empty your bladder after having sex.  Empty your bladder before going to sleep.  When do I need to call the doctor?  Signs of infection. These include a fever of 100.4°F (38°C) or higher, chills, back pain, nausea, throwing up, or bloody urine.  Signs come back after treatment ends  You notice more blood in your urine.  Your signs get worse or do not improve within 24 hours of starting treatment.  You are not able to urinate for more than 8 hours.  Your signs come back after treatment has stopped.  Teach Back: Helping You Understand  The Teach Back Method helps you understand the information we are giving you. After you talk with the staff, tell them in your own words what you learned. This helps to make sure the staff has described each thing clearly. It also helps to explain things that may have been confusing. Before going home, make sure you can do these things:  I can tell you about my condition.  I can tell you how to prevent this problem from coming back.  I can tell you what I will do if my signs do not get better after 24 hours of treatment or come back after I have finished treatment.

## 2024-02-12 NOTE — DOCUMENTATION CLARIFICATION NOTE
"    PATIENT:               ARNOLD VO  ACCT #:                  [id number]  MRN:                       [id number]  :                       1960  ADMIT DATE:       2024 1:17 AM  DISCH DATE:        2/10/2024 1:23 PM  RESPONDING PROVIDER #:        [id number]          PROVIDER RESPONSE TEXT:    Sepsis was a differential diagnosis and ruled out after study    CDI QUERY TEXT:    UH_CV Sepsis    Instruction:  Based on your assessment of the patient and the clinical information, please provide the requested documentation by clicking on the appropriate radio button and enter any additional information if prompted.    Question: Sepsis was documented in the medical record. Based on the documentation and the clinical information, can the diagnosis be further clarified as      When answering this query, please exercise your independent professional judgment. The fact that a question is being asked, does not imply that any particular answer is desired or expected.    The patient's clinical indicators include:  Clinical Information: 63 y.o. male presenting with mild lower abdominal pain as well as numbness of his extremities.    Documented Diagnosis: History and physical and progress note 24 documents \"Sepsis due to urinary tract infection\".    Clinical Indicators:  -Vital Signs: 2496-8312--/92-94 percent sat on RA  -WBC: 24- 14.9  -Microbiology Results: 24-  -Band Neutrophil Count/percent Band Neutrophil: 24-11.70-78.5  -Lactic acid: N/A  -BUN/Creat: 27-15/0.37  -Blood cultures: N/A  -Bilirubin: 24-0.3  -MAP: 24-  -Gregorio Coma Scale: N/A  -PAO2/FIO2: RA  -Procalcitonin: N/A  -Platelets: 24- 247  -Other clinical indicators:    Treatment: 1 liter IV fluids, IV Rocephin for UTI    Risk Factors: MS, Paraplegia, UTI with Chronic suprapubic catheter  Options provided:  -- Sepsis was a differential diagnosis and ruled out after study  -- Sepsis with other organ dysfunction, " Please specify sepsis associated organ dysfunction below  -- Other - I will add my own diagnosis  -- Refer to Clinical Documentation Reviewer    Query created by: Key Colvin on 2/12/2024 6:33 AM      Electronically signed by:  CLAUDIA GAY MD 2/12/2024 7:38 AM

## 2024-03-01 ENCOUNTER — HOSPITAL ENCOUNTER (OUTPATIENT)
Dept: RADIOLOGY | Facility: HOSPITAL | Age: 64
Discharge: HOME | End: 2024-03-01
Payer: MEDICARE

## 2024-03-01 ENCOUNTER — LAB (OUTPATIENT)
Dept: LAB | Facility: LAB | Age: 64
End: 2024-03-01
Payer: MEDICARE

## 2024-03-01 DIAGNOSIS — E05.90 THYROTOXICOSIS, UNSPECIFIED WITHOUT THYROTOXIC CRISIS OR STORM: Primary | ICD-10-CM

## 2024-03-01 DIAGNOSIS — E05.90 THYROTOXICOSIS, UNSPECIFIED WITHOUT THYROTOXIC CRISIS OR STORM: ICD-10-CM

## 2024-03-01 LAB
BASOPHILS # BLD AUTO: 0.07 X10*3/UL (ref 0–0.1)
BASOPHILS NFR BLD AUTO: 0.8 %
EOSINOPHIL # BLD AUTO: 0.58 X10*3/UL (ref 0–0.7)
EOSINOPHIL NFR BLD AUTO: 7 %
ERYTHROCYTE [DISTWIDTH] IN BLOOD BY AUTOMATED COUNT: 13.6 % (ref 11.5–14.5)
HCT VFR BLD AUTO: 43.8 % (ref 41–52)
HGB BLD-MCNC: 14.1 G/DL (ref 13.5–17.5)
IMM GRANULOCYTES # BLD AUTO: 0.01 X10*3/UL (ref 0–0.7)
IMM GRANULOCYTES NFR BLD AUTO: 0.1 % (ref 0–0.9)
LYMPHOCYTES # BLD AUTO: 1.45 X10*3/UL (ref 1.2–4.8)
LYMPHOCYTES NFR BLD AUTO: 17.4 %
MCH RBC QN AUTO: 29.4 PG (ref 26–34)
MCHC RBC AUTO-ENTMCNC: 32.2 G/DL (ref 32–36)
MCV RBC AUTO: 91 FL (ref 80–100)
MONOCYTES # BLD AUTO: 0.9 X10*3/UL (ref 0.1–1)
MONOCYTES NFR BLD AUTO: 10.8 %
NEUTROPHILS # BLD AUTO: 5.3 X10*3/UL (ref 1.2–7.7)
NEUTROPHILS NFR BLD AUTO: 63.9 %
NRBC BLD-RTO: 0 /100 WBCS (ref 0–0)
PLATELET # BLD AUTO: 356 X10*3/UL (ref 150–450)
RBC # BLD AUTO: 4.8 X10*6/UL (ref 4.5–5.9)
T4 FREE SERPL-MCNC: 0.88 NG/DL (ref 0.61–1.12)
TSH SERPL-ACNC: 3.66 MIU/L (ref 0.44–3.98)
WBC # BLD AUTO: 8.3 X10*3/UL (ref 4.4–11.3)

## 2024-03-01 PROCEDURE — 84445 ASSAY OF TSI GLOBULIN: CPT

## 2024-03-01 PROCEDURE — 36415 COLL VENOUS BLD VENIPUNCTURE: CPT

## 2024-03-01 PROCEDURE — 84439 ASSAY OF FREE THYROXINE: CPT

## 2024-03-01 PROCEDURE — 76536 US EXAM OF HEAD AND NECK: CPT | Performed by: RADIOLOGY

## 2024-03-01 PROCEDURE — 84443 ASSAY THYROID STIM HORMONE: CPT

## 2024-03-01 PROCEDURE — 86376 MICROSOMAL ANTIBODY EACH: CPT

## 2024-03-01 PROCEDURE — 85025 COMPLETE CBC W/AUTO DIFF WBC: CPT

## 2024-03-01 PROCEDURE — 76536 US EXAM OF HEAD AND NECK: CPT

## 2024-03-02 LAB — THYROPEROXIDASE AB SERPL-ACNC: <28 IU/ML

## 2024-03-07 LAB — TSI SER-ACNC: <1 TSI INDEX

## 2024-04-19 ENCOUNTER — LAB REQUISITION (OUTPATIENT)
Dept: LAB | Facility: HOSPITAL | Age: 64
DRG: 699 | End: 2024-04-19
Payer: MEDICARE

## 2024-04-19 ENCOUNTER — APPOINTMENT (OUTPATIENT)
Dept: CARDIOLOGY | Facility: HOSPITAL | Age: 64
DRG: 699 | End: 2024-04-19
Payer: MEDICARE

## 2024-04-19 ENCOUNTER — APPOINTMENT (OUTPATIENT)
Dept: RADIOLOGY | Facility: HOSPITAL | Age: 64
DRG: 699 | End: 2024-04-19
Payer: MEDICARE

## 2024-04-19 ENCOUNTER — HOSPITAL ENCOUNTER (INPATIENT)
Facility: HOSPITAL | Age: 64
LOS: 4 days | Discharge: NURSING, REHAB OR LONG-TERM CARE | DRG: 699 | End: 2024-04-23
Attending: EMERGENCY MEDICINE | Admitting: NURSE PRACTITIONER
Payer: MEDICARE

## 2024-04-19 DIAGNOSIS — N31.9 NEUROGENIC BLADDER: ICD-10-CM

## 2024-04-19 DIAGNOSIS — L89.159 PRESSURE INJURY OF SKIN OF SACRAL REGION, UNSPECIFIED INJURY STAGE: ICD-10-CM

## 2024-04-19 DIAGNOSIS — R31.9 URINARY TRACT INFECTION WITH HEMATURIA, SITE UNSPECIFIED: Primary | ICD-10-CM

## 2024-04-19 DIAGNOSIS — N39.0 URINARY TRACT INFECTION WITH HEMATURIA, SITE UNSPECIFIED: Primary | ICD-10-CM

## 2024-04-19 DIAGNOSIS — R41.82 ALTERED MENTAL STATUS, UNSPECIFIED: ICD-10-CM

## 2024-04-19 DIAGNOSIS — R41.0 CONFUSION: ICD-10-CM

## 2024-04-19 LAB
ALBUMIN SERPL BCP-MCNC: 3.7 G/DL (ref 3.4–5)
ALP SERPL-CCNC: 115 U/L (ref 33–136)
ALT SERPL W P-5'-P-CCNC: 21 U/L (ref 10–52)
ANION GAP SERPL CALC-SCNC: 14 MMOL/L (ref 10–20)
ANION GAP SERPL CALC-SCNC: 15 MMOL/L (ref 10–20)
APPEARANCE UR: ABNORMAL
APPEARANCE UR: ABNORMAL
AST SERPL W P-5'-P-CCNC: 22 U/L (ref 9–39)
BACTERIA #/AREA URNS AUTO: ABNORMAL /HPF
BACTERIA #/AREA URNS AUTO: ABNORMAL /HPF
BASOPHILS # BLD AUTO: 0.06 X10*3/UL (ref 0–0.1)
BASOPHILS # BLD AUTO: 0.08 X10*3/UL (ref 0–0.1)
BASOPHILS NFR BLD AUTO: 0.3 %
BASOPHILS NFR BLD AUTO: 0.3 %
BILIRUB SERPL-MCNC: 1 MG/DL (ref 0–1.2)
BILIRUB UR STRIP.AUTO-MCNC: NEGATIVE MG/DL
BILIRUB UR STRIP.AUTO-MCNC: NEGATIVE MG/DL
BUN SERPL-MCNC: 17 MG/DL (ref 6–23)
BUN SERPL-MCNC: 21 MG/DL (ref 6–23)
CALCIUM SERPL-MCNC: 8.4 MG/DL (ref 8.6–10.3)
CALCIUM SERPL-MCNC: 8.5 MG/DL (ref 8.6–10.3)
CARDIAC TROPONIN I PNL SERPL HS: 12 NG/L (ref 0–20)
CHLORIDE SERPL-SCNC: 96 MMOL/L (ref 98–107)
CHLORIDE SERPL-SCNC: 97 MMOL/L (ref 98–107)
CO2 SERPL-SCNC: 22 MMOL/L (ref 21–32)
CO2 SERPL-SCNC: 22 MMOL/L (ref 21–32)
COLOR UR: ABNORMAL
COLOR UR: YELLOW
CREAT SERPL-MCNC: 0.67 MG/DL (ref 0.5–1.3)
CREAT SERPL-MCNC: 0.97 MG/DL (ref 0.5–1.3)
EGFRCR SERPLBLD CKD-EPI 2021: 86 ML/MIN/1.73M*2
EGFRCR SERPLBLD CKD-EPI 2021: >90 ML/MIN/1.73M*2
EOSINOPHIL # BLD AUTO: 0.05 X10*3/UL (ref 0–0.7)
EOSINOPHIL # BLD AUTO: 0.05 X10*3/UL (ref 0–0.7)
EOSINOPHIL NFR BLD AUTO: 0.2 %
EOSINOPHIL NFR BLD AUTO: 0.3 %
ERYTHROCYTE [DISTWIDTH] IN BLOOD BY AUTOMATED COUNT: 14 % (ref 11.5–14.5)
ERYTHROCYTE [DISTWIDTH] IN BLOOD BY AUTOMATED COUNT: 14.3 % (ref 11.5–14.5)
GLUCOSE SERPL-MCNC: 91 MG/DL (ref 74–99)
GLUCOSE SERPL-MCNC: 92 MG/DL (ref 74–99)
GLUCOSE UR STRIP.AUTO-MCNC: NEGATIVE MG/DL
GLUCOSE UR STRIP.AUTO-MCNC: NEGATIVE MG/DL
HCT VFR BLD AUTO: 41.6 % (ref 41–52)
HCT VFR BLD AUTO: 42.5 % (ref 41–52)
HGB BLD-MCNC: 13.4 G/DL (ref 13.5–17.5)
HGB BLD-MCNC: 14.4 G/DL (ref 13.5–17.5)
HOLD SPECIMEN: NORMAL
IMM GRANULOCYTES # BLD AUTO: 0.09 X10*3/UL (ref 0–0.7)
IMM GRANULOCYTES # BLD AUTO: 0.11 X10*3/UL (ref 0–0.7)
IMM GRANULOCYTES NFR BLD AUTO: 0.5 % (ref 0–0.9)
IMM GRANULOCYTES NFR BLD AUTO: 0.5 % (ref 0–0.9)
KETONES UR STRIP.AUTO-MCNC: NEGATIVE MG/DL
KETONES UR STRIP.AUTO-MCNC: NEGATIVE MG/DL
LACTATE SERPL-SCNC: 1.2 MMOL/L (ref 0.4–2)
LEUKOCYTE ESTERASE UR QL STRIP.AUTO: ABNORMAL
LEUKOCYTE ESTERASE UR QL STRIP.AUTO: ABNORMAL
LYMPHOCYTES # BLD AUTO: 0.54 X10*3/UL (ref 1.2–4.8)
LYMPHOCYTES # BLD AUTO: 1.27 X10*3/UL (ref 1.2–4.8)
LYMPHOCYTES NFR BLD AUTO: 2.8 %
LYMPHOCYTES NFR BLD AUTO: 5.5 %
MCH RBC QN AUTO: 30.1 PG (ref 26–34)
MCH RBC QN AUTO: 30.6 PG (ref 26–34)
MCHC RBC AUTO-ENTMCNC: 32.2 G/DL (ref 32–36)
MCHC RBC AUTO-ENTMCNC: 33.9 G/DL (ref 32–36)
MCV RBC AUTO: 90 FL (ref 80–100)
MCV RBC AUTO: 94 FL (ref 80–100)
MONOCYTES # BLD AUTO: 1.98 X10*3/UL (ref 0.1–1)
MONOCYTES # BLD AUTO: 2.31 X10*3/UL (ref 0.1–1)
MONOCYTES NFR BLD AUTO: 11.9 %
MONOCYTES NFR BLD AUTO: 8.6 %
MUCOUS THREADS #/AREA URNS AUTO: ABNORMAL /LPF
NEUTROPHILS # BLD AUTO: 16.36 X10*3/UL (ref 1.2–7.7)
NEUTROPHILS # BLD AUTO: 19.46 X10*3/UL (ref 1.2–7.7)
NEUTROPHILS NFR BLD AUTO: 84.2 %
NEUTROPHILS NFR BLD AUTO: 84.9 %
NITRITE UR QL STRIP.AUTO: NEGATIVE
NITRITE UR QL STRIP.AUTO: NEGATIVE
NRBC BLD-RTO: 0 /100 WBCS (ref 0–0)
NRBC BLD-RTO: 0 /100 WBCS (ref 0–0)
PH UR STRIP.AUTO: 7 [PH]
PH UR STRIP.AUTO: 7 [PH]
PLATELET # BLD AUTO: 288 X10*3/UL (ref 150–450)
PLATELET # BLD AUTO: 343 X10*3/UL (ref 150–450)
POTASSIUM SERPL-SCNC: 3.6 MMOL/L (ref 3.5–5.3)
POTASSIUM SERPL-SCNC: 3.9 MMOL/L (ref 3.5–5.3)
PROT SERPL-MCNC: 6.6 G/DL (ref 6.4–8.2)
PROT UR STRIP.AUTO-MCNC: ABNORMAL MG/DL
PROT UR STRIP.AUTO-MCNC: ABNORMAL MG/DL
RBC # BLD AUTO: 4.45 X10*6/UL (ref 4.5–5.9)
RBC # BLD AUTO: 4.7 X10*6/UL (ref 4.5–5.9)
RBC # UR STRIP.AUTO: ABNORMAL /UL
RBC # UR STRIP.AUTO: ABNORMAL /UL
RBC #/AREA URNS AUTO: >20 /HPF
RBC #/AREA URNS AUTO: >20 /HPF
SODIUM SERPL-SCNC: 129 MMOL/L (ref 136–145)
SODIUM SERPL-SCNC: 129 MMOL/L (ref 136–145)
SP GR UR STRIP.AUTO: 1.01
SP GR UR STRIP.AUTO: 1.02
SQUAMOUS #/AREA URNS AUTO: ABNORMAL /HPF
UROBILINOGEN UR STRIP.AUTO-MCNC: 2 MG/DL
UROBILINOGEN UR STRIP.AUTO-MCNC: 2 MG/DL
WBC # BLD AUTO: 19.4 X10*3/UL (ref 4.4–11.3)
WBC # BLD AUTO: 23 X10*3/UL (ref 4.4–11.3)
WBC #/AREA URNS AUTO: >50 /HPF
WBC #/AREA URNS AUTO: >50 /HPF
WBC CLUMPS #/AREA URNS AUTO: ABNORMAL /HPF
WBC CLUMPS #/AREA URNS AUTO: ABNORMAL /HPF

## 2024-04-19 PROCEDURE — 85025 COMPLETE CBC W/AUTO DIFF WBC: CPT | Performed by: EMERGENCY MEDICINE

## 2024-04-19 PROCEDURE — 87040 BLOOD CULTURE FOR BACTERIA: CPT | Mod: SAMLAB | Performed by: EMERGENCY MEDICINE

## 2024-04-19 PROCEDURE — 87086 URINE CULTURE/COLONY COUNT: CPT | Mod: SAMLAB | Performed by: EMERGENCY MEDICINE

## 2024-04-19 PROCEDURE — 9420000001 HC RT PATIENT EDUCATION 5 MIN

## 2024-04-19 PROCEDURE — 85025 COMPLETE CBC W/AUTO DIFF WBC: CPT | Mod: OUT | Performed by: INTERNAL MEDICINE

## 2024-04-19 PROCEDURE — 71045 X-RAY EXAM CHEST 1 VIEW: CPT

## 2024-04-19 PROCEDURE — 93005 ELECTROCARDIOGRAM TRACING: CPT

## 2024-04-19 PROCEDURE — 1200000002 HC GENERAL ROOM WITH TELEMETRY DAILY

## 2024-04-19 PROCEDURE — 87086 URINE CULTURE/COLONY COUNT: CPT | Mod: OUT,SAMLAB | Performed by: REGISTERED NURSE

## 2024-04-19 PROCEDURE — 99222 1ST HOSP IP/OBS MODERATE 55: CPT | Performed by: NURSE PRACTITIONER

## 2024-04-19 PROCEDURE — 80053 COMPREHEN METABOLIC PANEL: CPT | Mod: OUT | Performed by: REGISTERED NURSE

## 2024-04-19 PROCEDURE — 80048 BASIC METABOLIC PNL TOTAL CA: CPT | Performed by: EMERGENCY MEDICINE

## 2024-04-19 PROCEDURE — 81001 URINALYSIS AUTO W/SCOPE: CPT | Performed by: EMERGENCY MEDICINE

## 2024-04-19 PROCEDURE — 2500000004 HC RX 250 GENERAL PHARMACY W/ HCPCS (ALT 636 FOR OP/ED): Performed by: NURSE PRACTITIONER

## 2024-04-19 PROCEDURE — 96361 HYDRATE IV INFUSION ADD-ON: CPT

## 2024-04-19 PROCEDURE — 96365 THER/PROPH/DIAG IV INF INIT: CPT

## 2024-04-19 PROCEDURE — 99285 EMERGENCY DEPT VISIT HI MDM: CPT | Mod: 25

## 2024-04-19 PROCEDURE — 2500000001 HC RX 250 WO HCPCS SELF ADMINISTERED DRUGS (ALT 637 FOR MEDICARE OP): Performed by: NURSE PRACTITIONER

## 2024-04-19 PROCEDURE — 36415 COLL VENOUS BLD VENIPUNCTURE: CPT | Performed by: EMERGENCY MEDICINE

## 2024-04-19 PROCEDURE — 71045 X-RAY EXAM CHEST 1 VIEW: CPT | Performed by: RADIOLOGY

## 2024-04-19 PROCEDURE — 94762 N-INVAS EAR/PLS OXIMTRY CONT: CPT

## 2024-04-19 PROCEDURE — 2500000004 HC RX 250 GENERAL PHARMACY W/ HCPCS (ALT 636 FOR OP/ED): Performed by: EMERGENCY MEDICINE

## 2024-04-19 PROCEDURE — 81001 URINALYSIS AUTO W/SCOPE: CPT | Mod: OUT | Performed by: REGISTERED NURSE

## 2024-04-19 PROCEDURE — 83605 ASSAY OF LACTIC ACID: CPT | Performed by: EMERGENCY MEDICINE

## 2024-04-19 PROCEDURE — 84484 ASSAY OF TROPONIN QUANT: CPT | Performed by: EMERGENCY MEDICINE

## 2024-04-19 RX ORDER — ONDANSETRON 4 MG/1
4 TABLET, ORALLY DISINTEGRATING ORAL EVERY 8 HOURS PRN
Status: DISCONTINUED | OUTPATIENT
Start: 2024-04-19 | End: 2024-04-23 | Stop reason: HOSPADM

## 2024-04-19 RX ORDER — ACETAMINOPHEN 325 MG/1
650 TABLET ORAL EVERY 4 HOURS PRN
Status: DISCONTINUED | OUTPATIENT
Start: 2024-04-19 | End: 2024-04-23 | Stop reason: HOSPADM

## 2024-04-19 RX ORDER — ENOXAPARIN SODIUM 100 MG/ML
40 INJECTION SUBCUTANEOUS EVERY 24 HOURS
Status: DISCONTINUED | OUTPATIENT
Start: 2024-04-19 | End: 2024-04-23 | Stop reason: HOSPADM

## 2024-04-19 RX ORDER — BACLOFEN 10 MG/1
10 TABLET ORAL 2 TIMES DAILY
Status: DISCONTINUED | OUTPATIENT
Start: 2024-04-19 | End: 2024-04-23 | Stop reason: HOSPADM

## 2024-04-19 RX ORDER — ALBUTEROL SULFATE 0.83 MG/ML
2.5 SOLUTION RESPIRATORY (INHALATION) EVERY 8 HOURS PRN
Status: COMPLETED | OUTPATIENT
Start: 2024-04-19 | End: 2024-04-21

## 2024-04-19 RX ORDER — DIPHENHYDRAMINE HCL 25 MG
25 TABLET ORAL
Status: ON HOLD | COMMUNITY
End: 2024-04-19 | Stop reason: ENTERED-IN-ERROR

## 2024-04-19 RX ORDER — POLYETHYLENE GLYCOL 3350 17 G/17G
17 POWDER, FOR SOLUTION ORAL DAILY
Status: DISCONTINUED | OUTPATIENT
Start: 2024-04-19 | End: 2024-04-23 | Stop reason: HOSPADM

## 2024-04-19 RX ORDER — METHIMAZOLE 5 MG/1
5 TABLET ORAL EVERY 8 HOURS SCHEDULED
Status: DISCONTINUED | OUTPATIENT
Start: 2024-04-19 | End: 2024-04-23 | Stop reason: HOSPADM

## 2024-04-19 RX ORDER — CARBAMAZEPINE 200 MG/1
200 TABLET ORAL 2 TIMES DAILY
Status: DISCONTINUED | OUTPATIENT
Start: 2024-04-19 | End: 2024-04-23 | Stop reason: HOSPADM

## 2024-04-19 RX ORDER — ATORVASTATIN CALCIUM 80 MG/1
80 TABLET, FILM COATED ORAL NIGHTLY
Status: DISCONTINUED | OUTPATIENT
Start: 2024-04-19 | End: 2024-04-23 | Stop reason: HOSPADM

## 2024-04-19 RX ORDER — METOPROLOL SUCCINATE 25 MG/1
25 TABLET, EXTENDED RELEASE ORAL DAILY
Status: DISCONTINUED | OUTPATIENT
Start: 2024-04-19 | End: 2024-04-20

## 2024-04-19 RX ORDER — ONDANSETRON HYDROCHLORIDE 2 MG/ML
4 INJECTION, SOLUTION INTRAVENOUS EVERY 8 HOURS PRN
Status: DISCONTINUED | OUTPATIENT
Start: 2024-04-19 | End: 2024-04-23 | Stop reason: HOSPADM

## 2024-04-19 RX ORDER — HYDROCODONE BITARTRATE AND ACETAMINOPHEN 5; 325 MG/1; MG/1
1 TABLET ORAL EVERY 12 HOURS PRN
Status: DISCONTINUED | OUTPATIENT
Start: 2024-04-19 | End: 2024-04-23 | Stop reason: HOSPADM

## 2024-04-19 RX ORDER — GABAPENTIN 300 MG/1
600 CAPSULE ORAL 3 TIMES DAILY
Status: DISCONTINUED | OUTPATIENT
Start: 2024-04-19 | End: 2024-04-23 | Stop reason: HOSPADM

## 2024-04-19 RX ADMIN — Medication 1 CAPSULE: at 21:07

## 2024-04-19 RX ADMIN — ENOXAPARIN SODIUM 40 MG: 40 INJECTION SUBCUTANEOUS at 15:00

## 2024-04-19 RX ADMIN — CARBAMAZEPINE 200 MG: 200 TABLET ORAL at 15:09

## 2024-04-19 RX ADMIN — MEROPENEM 1 G: 1 INJECTION, POWDER, FOR SOLUTION INTRAVENOUS at 08:30

## 2024-04-19 RX ADMIN — METHIMAZOLE 5 MG: 5 TABLET ORAL at 21:08

## 2024-04-19 RX ADMIN — ACETAMINOPHEN 650 MG: 325 TABLET ORAL at 16:59

## 2024-04-19 RX ADMIN — SODIUM CHLORIDE 1000 ML: 9 INJECTION, SOLUTION INTRAVENOUS at 08:18

## 2024-04-19 RX ADMIN — BACLOFEN 10 MG: 10 TABLET ORAL at 21:07

## 2024-04-19 RX ADMIN — GABAPENTIN 600 MG: 300 CAPSULE ORAL at 15:00

## 2024-04-19 RX ADMIN — Medication 1 CAPSULE: at 15:00

## 2024-04-19 RX ADMIN — METHIMAZOLE 5 MG: 5 TABLET ORAL at 15:09

## 2024-04-19 RX ADMIN — BACLOFEN 10 MG: 10 TABLET ORAL at 15:00

## 2024-04-19 RX ADMIN — MEROPENEM 1 G: 1 INJECTION, POWDER, FOR SOLUTION INTRAVENOUS at 17:00

## 2024-04-19 RX ADMIN — ATORVASTATIN CALCIUM 80 MG: 80 TABLET, FILM COATED ORAL at 21:07

## 2024-04-19 RX ADMIN — METOPROLOL SUCCINATE 25 MG: 25 TABLET, EXTENDED RELEASE ORAL at 15:00

## 2024-04-19 RX ADMIN — GABAPENTIN 600 MG: 300 CAPSULE ORAL at 21:06

## 2024-04-19 SDOH — SOCIAL STABILITY: SOCIAL INSECURITY: WERE YOU ABLE TO COMPLETE ALL THE BEHAVIORAL HEALTH SCREENINGS?: YES

## 2024-04-19 SDOH — SOCIAL STABILITY: SOCIAL INSECURITY: DO YOU FEEL ANYONE HAS EXPLOITED OR TAKEN ADVANTAGE OF YOU FINANCIALLY OR OF YOUR PERSONAL PROPERTY?: NO

## 2024-04-19 SDOH — SOCIAL STABILITY: SOCIAL INSECURITY: ARE THERE ANY APPARENT SIGNS OF INJURIES/BEHAVIORS THAT COULD BE RELATED TO ABUSE/NEGLECT?: NO

## 2024-04-19 SDOH — SOCIAL STABILITY: SOCIAL INSECURITY: DOES ANYONE TRY TO KEEP YOU FROM HAVING/CONTACTING OTHER FRIENDS OR DOING THINGS OUTSIDE YOUR HOME?: NO

## 2024-04-19 SDOH — SOCIAL STABILITY: SOCIAL INSECURITY: HAS ANYONE EVER THREATENED TO HURT YOUR FAMILY OR YOUR PETS?: NO

## 2024-04-19 SDOH — SOCIAL STABILITY: SOCIAL INSECURITY: ABUSE: ADULT

## 2024-04-19 SDOH — SOCIAL STABILITY: SOCIAL INSECURITY: ARE YOU OR HAVE YOU BEEN THREATENED OR ABUSED PHYSICALLY, EMOTIONALLY, OR SEXUALLY BY ANYONE?: NO

## 2024-04-19 SDOH — SOCIAL STABILITY: SOCIAL INSECURITY: HAVE YOU HAD ANY THOUGHTS OF HARMING ANYONE ELSE?: NO

## 2024-04-19 SDOH — SOCIAL STABILITY: SOCIAL INSECURITY: HAVE YOU HAD THOUGHTS OF HARMING ANYONE ELSE?: NO

## 2024-04-19 SDOH — SOCIAL STABILITY: SOCIAL INSECURITY: DO YOU FEEL UNSAFE GOING BACK TO THE PLACE WHERE YOU ARE LIVING?: NO

## 2024-04-19 ASSESSMENT — ACTIVITIES OF DAILY LIVING (ADL)
BATHING: DEPENDENT
JUDGMENT_ADEQUATE_SAFELY_COMPLETE_DAILY_ACTIVITIES: YES
ASSISTIVE_DEVICE: EYEGLASSES;WHEELCHAIR
ADEQUATE_TO_COMPLETE_ADL: YES
DRESSING YOURSELF: DEPENDENT
HEARING - RIGHT EAR: FUNCTIONAL
PATIENT'S MEMORY ADEQUATE TO SAFELY COMPLETE DAILY ACTIVITIES?: YES
GROOMING: DEPENDENT
LACK_OF_TRANSPORTATION: NO
FEEDING YOURSELF: INDEPENDENT
HEARING - LEFT EAR: FUNCTIONAL
TOILETING: DEPENDENT
WALKS IN HOME: DEPENDENT

## 2024-04-19 ASSESSMENT — PATIENT HEALTH QUESTIONNAIRE - PHQ9
1. LITTLE INTEREST OR PLEASURE IN DOING THINGS: NOT AT ALL
SUM OF ALL RESPONSES TO PHQ9 QUESTIONS 1 & 2: 0
2. FEELING DOWN, DEPRESSED OR HOPELESS: NOT AT ALL

## 2024-04-19 ASSESSMENT — LIFESTYLE VARIABLES
SKIP TO QUESTIONS 9-10: 1
HOW MANY STANDARD DRINKS CONTAINING ALCOHOL DO YOU HAVE ON A TYPICAL DAY: PATIENT DOES NOT DRINK
PRESCIPTION_ABUSE_PAST_12_MONTHS: NO
HOW OFTEN DO YOU HAVE 6 OR MORE DRINKS ON ONE OCCASION: NEVER
AUDIT-C TOTAL SCORE: 0
SUBSTANCE_ABUSE_PAST_12_MONTHS: NO
HOW OFTEN DO YOU HAVE A DRINK CONTAINING ALCOHOL: NEVER
AUDIT-C TOTAL SCORE: 0

## 2024-04-19 ASSESSMENT — PAIN DESCRIPTION - LOCATION: LOCATION: COCCYX

## 2024-04-19 ASSESSMENT — PAIN - FUNCTIONAL ASSESSMENT
PAIN_FUNCTIONAL_ASSESSMENT: 0-10

## 2024-04-19 ASSESSMENT — PAIN SCALES - GENERAL
PAINLEVEL_OUTOF10: 4
PAINLEVEL_OUTOF10: 0 - NO PAIN
PAINLEVEL_OUTOF10: 3
PAINLEVEL_OUTOF10: 0 - NO PAIN

## 2024-04-19 ASSESSMENT — PAIN DESCRIPTION - DESCRIPTORS: DESCRIPTORS: ACHING

## 2024-04-19 ASSESSMENT — COGNITIVE AND FUNCTIONAL STATUS - GENERAL: PATIENT BASELINE BEDBOUND: YES

## 2024-04-19 NOTE — H&P
History Of Present Illness  Raheem Hilton is a 64 y.o. male with PMHx of MS functional paraplegic.  , neurogenic bladder with recurrent UTI, TIA, COPD, Chronic buttock wound, chronic hyponatremia and HTN  presents from local skilled nursing facility with concern for altered mentation.Labs done in ER reveal WBC 23.0, lactic acid 1.2, temp 98.1, UA positive For UTI. Patient is prone to urinary tract infection given his suprapubic catheter. Pt given IV fluids and started on meropenem and admitted to the hospital. At this time he will be admitted to the inpatient unit for continued treatment. He has what appears to be a recurrent urinary tract infection with an elevated white count.  Does not have other signs of sepsis currently.     Past Medical History  Past Medical History:   Diagnosis Date    Anemia     Bowel perforation (Multi)     Buttock wound, unspecified laterality, subsequent encounter     COPD (chronic obstructive pulmonary disease) (Multi)     Extended spectrum beta lactamase (ESBL) resistance     GERD (gastroesophageal reflux disease)     Hyperlipidemia     Hypertension     Hypo-osmolality and hyponatremia     Insomnia     Multiple sclerosis (Multi)     Neurogenic bladder     Neuromuscular dysfunction of bladder     Personal history of transient ischemic attack (TIA), and cerebral infarction without residual deficits 08/06/2020    History of embolic stroke    Pulmonary embolism (Multi)     Sepsis with encephalopathy (Multi)     Septic shock (Multi)     Simple febrile convulsions (Multi)     Status post administration of tPA (rtPA) in a different facility within the last 24 hours prior to admission to current facility 08/06/2020    Tissue plasminogen activator (tPA) administered at other facility within 24 hours before current admission    UTI (urinary tract infection)        Surgical History  Past Surgical History:   Procedure Laterality Date    MR HEAD ANGIO WO IV CONTRAST  6/29/2020    MR HEAD ANGIO WO  IV CONTRAST 6/29/2020 [facility]ICAL [facility]    MR NECK ANGIO WO IV CONTRAST  6/29/2020    MR NECK ANGIO WO IV CONTRAST 6/29/2020 [facility] CLINICAL LEGACY        Social History  He reports that he has never smoked. He has never used smokeless tobacco. No history on file for alcohol use and drug use.    Family History  No family history on file.     Allergies  Bactrim [sulfamethoxazole-trimethoprim], Cefepime, Doxycycline, and Heparin    Review of Systems   Review of Systems   Constitutional: Negative.    HENT: Negative.     Eyes: Negative.    Respiratory: Negative.     Cardiovascular: Negative.    Gastrointestinal: Denies discomfort   Endocrine: Negative.    Genitourinary: Negative.    Musculoskeletal: Negative.    Skin: Negative.    Allergic/Immunologic: Negative.    Neurological:  Positive for weakness and numbness.   Hematological: Negative.    Psychiatric/Behavioral: Negative  Physical Exam   Constitutional:       Appearance: Normal appearance.   HENT:      Head: Normocephalic and atraumatic.      Right Ear: External ear normal.      Left Ear: External ear normal.      Nose: Nose normal.      Mouth/Throat:      Mouth: Mucous membranes are moist.      Pharynx: Oropharynx is clear.   Eyes:      Extraocular Movements: Extraocular movements intact.      Conjunctiva/sclera: Conjunctivae normal.      Pupils: Pupils are equal, round, and reactive to light.   Cardiovascular:      Rate and Rhythm: Normal rate and regular rhythm.   Pulmonary:      Effort: Pulmonary effort is normal.      Breath sounds: Normal breath sounds.   Abdominal:      General: Abdomen is flat.      Palpations: Abdomen is soft.      Comments: Suprapubic catheter present insertion site looks clean   Musculoskeletal:         General: No swelling.      Comments: Slightly contracted lower extremities   Skin:     General: Skin is warm and dry.      Comments: Very dry skin on lower extremities  Sacral decubitus ulcer Photos taken and added to  "chart  Neurological:      General: No focal deficit present.      Mental Status: He is alert and oriented to person, place, and time.   Psychiatric:         Mood and Affect: Mood normal.         Behavior: Behavior normal.   Last Recorded Vitals  Blood pressure 163/82, pulse (!) 116, temperature 36.7 °C (98.1 °F), temperature source Oral, resp. rate 18, height 1.854 m (6' 0.99\"), weight 79 kg (174 lb 1.6 oz), SpO2 95%.    Relevant Results  Scheduled medications  atorvastatin, 80 mg, oral, Nightly  baclofen, 10 mg, oral, BID  carBAMazepine, 200 mg, oral, BID  enoxaparin, 40 mg, subcutaneous, q24h  gabapentin, 600 mg, oral, TID  lactobacillus acidophilus, 1 capsule, oral, TID  meropenem, 1 g, intravenous, q8h  methIMAzole, 5 mg, oral, q8h CARMELO  metoprolol succinate XL, 25 mg, oral, Daily  polyethylene glycol, 17 g, oral, Daily      Continuous medications     PRN medications  PRN medications: acetaminophen, albuterol, HYDROcodone-acetaminophen, ondansetron ODT **OR** ondansetron   Results for orders placed or performed during the hospital encounter of 04/19/24 (from the past 24 hour(s))   Urinalysis with Reflex Culture and Microscopic   Result Value Ref Range    Color, Urine Yellow Straw, Yellow    Appearance, Urine Hazy (N) Clear    Specific Gravity, Urine 1.015 1.005 - 1.035    pH, Urine 7.0 5.0, 5.5, 6.0, 6.5, 7.0, 7.5, 8.0    Protein, Urine 30 (1+) (N) NEGATIVE mg/dL    Glucose, Urine NEGATIVE NEGATIVE mg/dL    Blood, Urine MODERATE (2+) (A) NEGATIVE    Ketones, Urine NEGATIVE NEGATIVE mg/dL    Bilirubin, Urine NEGATIVE NEGATIVE    Urobilinogen, Urine 2.0 (N) <2.0 mg/dL    Nitrite, Urine NEGATIVE NEGATIVE    Leukocyte Esterase, Urine LARGE (3+) (A) NEGATIVE   Microscopic Only, Urine   Result Value Ref Range    WBC, Urine >50 (A) 1-5, NONE /HPF    WBC Clumps, Urine OCCASIONAL Reference range not established. /HPF    RBC, Urine >20 (A) NONE, 1-2, 3-5 /HPF    Bacteria, Urine 2+ (A) NONE SEEN /HPF    Mucus, Urine 1+ " Reference range not established. /LPF   CBC and Auto Differential   Result Value Ref Range    WBC 23.0 (H) 4.4 - 11.3 x10*3/uL    nRBC 0.0 0.0 - 0.0 /100 WBCs    RBC 4.45 (L) 4.50 - 5.90 x10*6/uL    Hemoglobin 13.4 (L) 13.5 - 17.5 g/dL    Hematocrit 41.6 41.0 - 52.0 %    MCV 94 80 - 100 fL    MCH 30.1 26.0 - 34.0 pg    MCHC 32.2 32.0 - 36.0 g/dL    RDW 14.3 11.5 - 14.5 %    Platelets 288 150 - 450 x10*3/uL    Neutrophils % 84.9 40.0 - 80.0 %    Immature Granulocytes %, Automated 0.5 0.0 - 0.9 %    Lymphocytes % 5.5 13.0 - 44.0 %    Monocytes % 8.6 2.0 - 10.0 %    Eosinophils % 0.2 0.0 - 6.0 %    Basophils % 0.3 0.0 - 2.0 %    Neutrophils Absolute 19.46 (H) 1.20 - 7.70 x10*3/uL    Immature Granulocytes Absolute, Automated 0.11 0.00 - 0.70 x10*3/uL    Lymphocytes Absolute 1.27 1.20 - 4.80 x10*3/uL    Monocytes Absolute 1.98 (H) 0.10 - 1.00 x10*3/uL    Eosinophils Absolute 0.05 0.00 - 0.70 x10*3/uL    Basophils Absolute 0.08 0.00 - 0.10 x10*3/uL   Comprehensive Metabolic Panel   Result Value Ref Range    Glucose 91 74 - 99 mg/dL    Sodium 129 (L) 136 - 145 mmol/L    Potassium 3.6 3.5 - 5.3 mmol/L    Chloride 97 (L) 98 - 107 mmol/L    Bicarbonate 22 21 - 32 mmol/L    Anion Gap 14 10 - 20 mmol/L    Urea Nitrogen 21 6 - 23 mg/dL    Creatinine 0.97 0.50 - 1.30 mg/dL    eGFR 86 >60 mL/min/1.73m*2    Calcium 8.4 (L) 8.6 - 10.3 mg/dL    Albumin 3.7 3.4 - 5.0 g/dL    Alkaline Phosphatase 115 33 - 136 U/L    Total Protein 6.6 6.4 - 8.2 g/dL    AST 22 9 - 39 U/L    Bilirubin, Total 1.0 0.0 - 1.2 mg/dL    ALT 21 10 - 52 U/L   Lactate   Result Value Ref Range    Lactate 1.2 0.4 - 2.0 mmol/L   Troponin I, High Sensitivity   Result Value Ref Range    Troponin I, High Sensitivity 12 0 - 20 ng/L   ECG 12 lead   Result Value Ref Range    Ventricular Rate 87 BPM    Atrial Rate 87 BPM    WV Interval 210 ms    QRS Duration 98 ms    QT Interval 372 ms    QTC Calculation(Bazett) 447 ms    P Axis 52 degrees    R Axis 34 degrees    T Axis 28  degrees    QRS Count 14 beats    Q Onset 221 ms    P Onset 116 ms    P Offset 171 ms    T Offset 407 ms    QTC Fredericia 420 ms    ECG 12 lead    Result Date: 4/19/2024  Sinus rhythm with 1st degree AV block Otherwise normal ECG When compared with ECG of 08-JAN-2024 17:27, IA interval has increased Vent. rate has decreased BY  53 BPM QRS duration has increased ST no longer depressed in Anterolateral leads    XR chest 1 view    Result Date: 4/19/2024  Interpreted By:  Alex Bowman, STUDY: XR CHEST 1 VIEW   INDICATION: Signs/Symptoms:confusion.   COMPARISON: January 8   ACCESSION NUMBER(S): [id number]   ORDERING CLINICIAN: JUAN DIEGO MCKEON   FINDINGS: No consolidation, effusion, edema, or pneumothorax. Chronic interstitial lung changes, mild similar to prior study. Heart size within normal limits.       No evidence of acute intrathoracic abnormality.   Signed by: Alex Bowman 4/19/2024 8:29 AM Dictation workstation:   [id number]         Assessment/Plan   Principal Problem:    Urinary tract infection with hematuria, site unspecified    Raheem Hilton is a 64 y.o. male with PMHx of MS functional paraplegic.  , neurogenic bladder with recurrent UTI, TIA, COPD, Chronic buttock wound, chronic hyponatremia and HTN  presents from local skilled nursing facility with concern for altered mentation. Patient is prone to urinary tract infection given his suprapubic catheter. At this time he will be admitted to the inpatient unit for continued treatment. He has what appears to be a recurrent urinary tract infection with an elevated white count.  Does not have other signs of sepsis currently.    Principal Problem:  Altered mental status  -Due to UTI  -Monitor      UTI (urinary tract infection) indwelling suprapubic catheter(catheter related UTI)   -Continue Meropenem  -CBC, BMP in am  -Consult Urology for cath change.  -Urine cultures pending  HX ESBL       Sacral decubitus ulcer-Chronic  -Continue previous wound care orders  from Nursing home    Hypotnatremia-Chronic  -range 129-132  -bmp in am    MS  -baclofen for spasticity    Hx of seizures  -Tegretol level     Hyperthyroidism  -On methimazole    Disposition: Return to nursing facility 1-2 days when medically stable.          DVT prophylaxis     I spent 35 minutes in the professional and overall care of this patient.      Paula Diaz, APRN-CNP

## 2024-04-19 NOTE — ED PROVIDER NOTES
HPI   No chief complaint on file.      Limitations to History: None    HPI: 64-year-old male presents from local skilled nursing facility with concern for altered mentation.  Blood work is been done which showed an elevated white blood cell count.  Patient is prone to urinary tract infection given his suprapubic catheter.  Patient has no complaints including headache, vision change, chest pain, shortness of breath, nausea, vomiting, abdominal pain.    Additional History Obtained from: EMS    ------------------------------------------------------------------------------------------------------------------------------------------  Physical Exam:    VS: As documented in the triage note and EMR flowsheet from this visit were reviewed.    Appearance: Alert. cooperative,  in no acute distress.   Skin: Intact,  dry skin, no lesions, rash, petechiae or purpura.   Eyes: PERRLA, EOMs intact,  Conjunctiva pink with no redness or exudates.   HENT: Normocephalic, atraumatic. Nares patent. No intraoral lesions.   Neck: Supple, without meningismus. Trachea at midline. No lymphadenopathy.  Pulmonary: Clear bilaterally with good chest wall excursion. No rales, rhonchi or wheezing. No accessory muscle use or stridor.  Cardiac: Regular rate and rhythm, no rubs, murmurs, or gallops.   Abdomen: Abdomen is soft, nontender, and nondistended.  No palpable organomegaly.  No rebound or guarding.  No CVA tenderness. Nonsurgical abdomen.  Genitourinary: Suprapubic catheter in place without surrounding erythema.  Musculoskeletal: Lower extremity paraplegia.    Neurological:  Cranial nerves are grossly intact, grossly normal sensation, no weakness, no focal findings identified.  Psychiatric: Appropriate mood and affect.                            No data recorded                   Patient History   Past Medical History:   Diagnosis Date    Anemia     Bowel perforation (Multi)     Buttock wound, unspecified laterality, subsequent encounter      COPD (chronic obstructive pulmonary disease) (Multi)     Extended spectrum beta lactamase (ESBL) resistance     GERD (gastroesophageal reflux disease)     Hyperlipidemia     Hypertension     Hypo-osmolality and hyponatremia     Insomnia     Multiple sclerosis (Multi)     Neurogenic bladder     Neuromuscular dysfunction of bladder     Personal history of transient ischemic attack (TIA), and cerebral infarction without residual deficits 08/06/2020    History of embolic stroke    Pulmonary embolism (Multi)     Sepsis with encephalopathy (Multi)     Septic shock (Multi)     Simple febrile convulsions (Multi)     Status post administration of tPA (rtPA) in a different facility within the last 24 hours prior to admission to current facility 08/06/2020    Tissue plasminogen activator (tPA) administered at other facility within 24 hours before current admission    UTI (urinary tract infection)      Past Surgical History:   Procedure Laterality Date    MR HEAD ANGIO WO IV CONTRAST  6/29/2020    MR HEAD ANGIO WO IV CONTRAST 6/29/2020 [facility]GAC[facility]    MR NECK ANGIO WO IV CONTRAST  6/29/2020    MR NECK ANGIO WO IV CONTRAST 6/29/2020 [facility] CLINICAL [facility]GAC[facility]     No family history on file.  Social History     Tobacco Use    Smoking status: Never    Smokeless tobacco: Never   Substance Use Topics    Alcohol use: Not on file    Drug use: Not on file       Physical Exam   ED Triage Vitals   Temp Pulse Resp BP   -- -- -- --      SpO2 Temp src Heart Rate Source Patient Position   -- -- -- --      BP Location FiO2 (%)     -- --       Physical Exam    ED Course & MDM   Diagnoses as of 04/19/24 0907   Urinary tract infection with hematuria, site unspecified   Confusion       Medical Decision Making  Labs Reviewed  CBC WITH AUTO DIFFERENTIAL - Abnormal     WBC                           23.0 (*)               nRBC                          0.0                    RBC                           4.45 (*)               Hemoglobin                     13.4 (*)               Hematocrit                    41.6                   MCV                           94                     MCH                           30.1                   MCHC                          32.2                   RDW                           14.3                   Platelets                     288                    Neutrophils %                 84.9                   Immature Granulocytes %, Automated   0.5                    Lymphocytes %                 5.5                    Monocytes %                   8.6                    Eosinophils %                 0.2                    Basophils %                   0.3                    Neutrophils Absolute          19.46 (*)               Immature Granulocytes Absolute, Au*   0.11                   Lymphocytes Absolute          1.27                   Monocytes Absolute            1.98 (*)               Eosinophils Absolute          0.05                   Basophils Absolute            0.08                COMPREHENSIVE METABOLIC PANEL - Abnormal     Glucose                       91                     Sodium                        129 (*)                Potassium                     3.6                    Chloride                      97 (*)                 Bicarbonate                   22                     Anion Gap                     14                     Urea Nitrogen                 21                     Creatinine                    0.97                   eGFR                          86                     Calcium                       8.4 (*)                Albumin                       3.7                    Alkaline Phosphatase          115                    Total Protein                 6.6                    AST                           22                     Bilirubin, Total              1.0                    ALT                           21                  URINALYSIS WITH REFLEX CULTURE AND MICROSCOPIC - Abnormal      Color, Urine                  Yellow                 Appearance, Urine             Hazy (*)               Specific Gravity, Urine       1.015                  pH, Urine                     7.0                    Protein, Urine                30 (1+) (*)               Glucose, Urine                NEGATIVE                Blood, Urine                    (*)                  Ketones, Urine                NEGATIVE                Bilirubin, Urine              NEGATIVE                Urobilinogen, Urine           2.0 (*)                Nitrite, Urine                NEGATIVE                Leukocyte Esterase, Urine     LARGE (3+) (*)            MICROSCOPIC ONLY, URINE - Abnormal     WBC, Urine                    >50 (*)                WBC Clumps, Urine             OCCASIONAL                RBC, Urine                    >20 (*)                Bacteria, Urine               2+ (*)                 Mucus, Urine                  1+                  LACTATE - Normal     Lactate                       1.2                        Narrative: Venipuncture immediately after or during the administration of Metamizole may lead to falsely low results. Testing should be performed immediately                  prior to Metamizole dosing.  TROPONIN I, HIGH SENSITIVITY - Normal     Troponin I, High Sensitivity   12                         Narrative: Less than 99th percentile of normal range cutoff-                  Female and children under 18 years old <14 ng/L; Male <21 ng/L: Negative                  Repeat testing should be performed if clinically indicated.                                     Female and children under 18 years old 14-50 ng/L; Male 21-50 ng/L:                  Consistent with possible cardiac damage and possible increased clinical                   risk. Serial measurements may help to assess extent of myocardial damage.                                     >50 ng/L: Consistent with cardiac damage, increased clinical risk  and                  myocardial infarction. Serial measurements may help assess extent of                   myocardial damage.                                      NOTE: Children less than 1 year old may have higher baseline troponin                   levels and results should be interpreted in conjunction with the overall                   clinical context.                                     NOTE: Troponin I testing is performed using a different                   testing methodology at [facility] than at other                   [facility]. Direct result comparisons should only                   be made within the same method.  BLOOD CULTURE  BLOOD CULTURE  URINE CULTURE  URINALYSIS WITH REFLEX CULTURE AND MICROSCOPIC         Narrative: The following orders were created for panel order Urinalysis with Reflex Culture and Microscopic.                  Procedure                               Abnormality         Status                                     ---------                               -----------         ------                                     Urinalysis with Reflex C...[[id number]]  Abnormal            Final result                               Extra Urine Gray Tube[[id number]]                            In process                                                   Please view results for these tests on the individual orders.  EXTRA URINE GRAY TUBE  XR chest 1 view   Final Result    No evidence of acute intrathoracic abnormality.          Signed by: Alex Bowman 4/19/2024 8:29 AM    Dictation workstation:   [id number]     Medical Decision Making:    Patient appears well nontoxic.  Afebrile.  Blood cell count of 23,000.  Lactate negative.  Blood cultures pending.  Patient treated with 1 L of normal saline and meropenem.  Chest x-ray clear.  Patient will be admitted for further treatment and evaluation.  Stable at time of admission.    Differential Diagnoses Considered: UTI, sepsis,  pneumonia, electrolyte abnormality, volume depletion    Independent Interpretation of Studies:  I independently interpreted: Chest x-ray shows no evidence of pneumonia or pneumothorax.    Escalation of Care:  Appropriate for admission for further treatment and evaluation.    Discussion of Management with Other Providers:   I discussed the patient/results with: Admitting hospitalist.          Procedure  ECG 12 lead    Performed by: Po Bhakta DO  Authorized by: Po Bhakta DO    ECG interpreted by ED Physician in the absence of a cardiologist: yes    Comments:      EKG interpreted by Dr. Po Bhakta: Normal sinus rhythm at 87 bpm.  First-degree block with a IL interval 210 ms.  QTc of 447 ms.  No evidence of ST elevation or depression at this time.       Po Bhakta DO  04/19/24 0913

## 2024-04-19 NOTE — CARE PLAN
Problem: Pain  Goal: My pain/discomfort is manageable  Outcome: Progressing     Problem: Safety  Goal: Patient will be injury free during hospitalization  Outcome: Progressing  Goal: I will remain free of falls  Outcome: Progressing     Problem: Daily Care  Goal: Daily care needs are met  Outcome: Progressing     Problem: Psychosocial Needs  Goal: Demonstrates ability to cope with hospitalization/illness  Outcome: Progressing  Goal: Collaborate with me, my family, and caregiver to identify my specific goals  Outcome: Progressing     Problem: Discharge Barriers  Goal: My discharge needs are met  Outcome: Progressing     Problem: Skin  Goal: Decreased wound size/increased tissue granulation at next dressing change  Outcome: Progressing  Flowsheets (Taken 4/19/2024 1042)  Decreased wound size/increased tissue granulation at next dressing change: Promote sleep for wound healing  Goal: Participates in plan/prevention/treatment measures  Outcome: Progressing  Flowsheets (Taken 4/19/2024 1042)  Participates in plan/prevention/treatment measures: Increase activity/out of bed for meals  Goal: Prevent/manage excess moisture  Outcome: Progressing  Flowsheets (Taken 4/19/2024 1042)  Prevent/manage excess moisture: Moisturize dry skin  Goal: Prevent/minimize sheer/friction injuries  Outcome: Progressing  Flowsheets (Taken 4/19/2024 1042)  Prevent/minimize sheer/friction injuries:   Turn/reposition every 2 hours/use positioning/transfer devices   Use pull sheet  Goal: Promote/optimize nutrition  Outcome: Progressing  Flowsheets (Taken 4/19/2024 1042)  Promote/optimize nutrition: Consume > 50% meals/supplements  Goal: Promote skin healing  Outcome: Progressing  Flowsheets (Taken 4/19/2024 1042)  Promote skin healing: Turn/reposition every 2 hours/use positioning/transfer devices   The patient's goals for the shift include      The clinical goals for the shift include      Over the shift, the patient did not make progress toward  the following goals. Barriers to progression include . Recommendations to address these barriers include .

## 2024-04-20 LAB
ANION GAP SERPL CALC-SCNC: 12 MMOL/L (ref 10–20)
ATRIAL RATE: 87 BPM
BUN SERPL-MCNC: 19 MG/DL (ref 6–23)
CALCIUM SERPL-MCNC: 8.6 MG/DL (ref 8.6–10.3)
CARBAMAZEPINE SERPL-MCNC: 7.7 UG/ML (ref 4–12)
CHLORIDE SERPL-SCNC: 101 MMOL/L (ref 98–107)
CO2 SERPL-SCNC: 24 MMOL/L (ref 21–32)
CREAT SERPL-MCNC: 1 MG/DL (ref 0.5–1.3)
EGFRCR SERPLBLD CKD-EPI 2021: 83 ML/MIN/1.73M*2
ERYTHROCYTE [DISTWIDTH] IN BLOOD BY AUTOMATED COUNT: 14.8 % (ref 11.5–14.5)
GLUCOSE SERPL-MCNC: 101 MG/DL (ref 74–99)
HCT VFR BLD AUTO: 42.8 % (ref 41–52)
HGB BLD-MCNC: 14 G/DL (ref 13.5–17.5)
MCH RBC QN AUTO: 30.4 PG (ref 26–34)
MCHC RBC AUTO-ENTMCNC: 32.7 G/DL (ref 32–36)
MCV RBC AUTO: 93 FL (ref 80–100)
NRBC BLD-RTO: 0 /100 WBCS (ref 0–0)
P AXIS: 52 DEGREES
P OFFSET: 171 MS
P ONSET: 116 MS
PLATELET # BLD AUTO: 309 X10*3/UL (ref 150–450)
POTASSIUM SERPL-SCNC: 3.7 MMOL/L (ref 3.5–5.3)
PR INTERVAL: 210 MS
Q ONSET: 221 MS
QRS COUNT: 14 BEATS
QRS DURATION: 98 MS
QT INTERVAL: 372 MS
QTC CALCULATION(BAZETT): 447 MS
QTC FREDERICIA: 420 MS
R AXIS: 34 DEGREES
RBC # BLD AUTO: 4.6 X10*6/UL (ref 4.5–5.9)
SODIUM SERPL-SCNC: 133 MMOL/L (ref 136–145)
T AXIS: 28 DEGREES
T OFFSET: 407 MS
VENTRICULAR RATE: 87 BPM
WBC # BLD AUTO: 24.7 X10*3/UL (ref 4.4–11.3)

## 2024-04-20 PROCEDURE — 1200000002 HC GENERAL ROOM WITH TELEMETRY DAILY

## 2024-04-20 PROCEDURE — 85027 COMPLETE CBC AUTOMATED: CPT | Performed by: NURSE PRACTITIONER

## 2024-04-20 PROCEDURE — 2500000001 HC RX 250 WO HCPCS SELF ADMINISTERED DRUGS (ALT 637 FOR MEDICARE OP): Performed by: NURSE PRACTITIONER

## 2024-04-20 PROCEDURE — 80156 ASSAY CARBAMAZEPINE TOTAL: CPT | Mod: SAMLAB | Performed by: NURSE PRACTITIONER

## 2024-04-20 PROCEDURE — 94640 AIRWAY INHALATION TREATMENT: CPT | Mod: MUE

## 2024-04-20 PROCEDURE — 2500000004 HC RX 250 GENERAL PHARMACY W/ HCPCS (ALT 636 FOR OP/ED): Performed by: NURSE PRACTITIONER

## 2024-04-20 PROCEDURE — 2500000002 HC RX 250 W HCPCS SELF ADMINISTERED DRUGS (ALT 637 FOR MEDICARE OP, ALT 636 FOR OP/ED): Mod: MUE | Performed by: NURSE PRACTITIONER

## 2024-04-20 PROCEDURE — 9420000001 HC RT PATIENT EDUCATION 5 MIN

## 2024-04-20 PROCEDURE — 94664 DEMO&/EVAL PT USE INHALER: CPT

## 2024-04-20 PROCEDURE — 99232 SBSQ HOSP IP/OBS MODERATE 35: CPT | Performed by: INTERNAL MEDICINE

## 2024-04-20 PROCEDURE — 82374 ASSAY BLOOD CARBON DIOXIDE: CPT | Performed by: NURSE PRACTITIONER

## 2024-04-20 PROCEDURE — 2500000001 HC RX 250 WO HCPCS SELF ADMINISTERED DRUGS (ALT 637 FOR MEDICARE OP): Performed by: INTERNAL MEDICINE

## 2024-04-20 PROCEDURE — 36415 COLL VENOUS BLD VENIPUNCTURE: CPT | Performed by: NURSE PRACTITIONER

## 2024-04-20 RX ORDER — METOPROLOL SUCCINATE 50 MG/1
50 TABLET, EXTENDED RELEASE ORAL DAILY
Status: DISCONTINUED | OUTPATIENT
Start: 2024-04-21 | End: 2024-04-23 | Stop reason: HOSPADM

## 2024-04-20 RX ORDER — METOPROLOL SUCCINATE 25 MG/1
25 TABLET, EXTENDED RELEASE ORAL ONCE
Status: COMPLETED | OUTPATIENT
Start: 2024-04-20 | End: 2024-04-20

## 2024-04-20 RX ADMIN — POLYETHYLENE GLYCOL 3350 17 G: 17 POWDER, FOR SOLUTION ORAL at 08:22

## 2024-04-20 RX ADMIN — GABAPENTIN 600 MG: 300 CAPSULE ORAL at 08:22

## 2024-04-20 RX ADMIN — MEROPENEM 1 G: 1 INJECTION, POWDER, FOR SOLUTION INTRAVENOUS at 00:11

## 2024-04-20 RX ADMIN — ALBUTEROL SULFATE 2.5 MG: 2.5 SOLUTION RESPIRATORY (INHALATION) at 13:27

## 2024-04-20 RX ADMIN — ATORVASTATIN CALCIUM 80 MG: 80 TABLET, FILM COATED ORAL at 20:47

## 2024-04-20 RX ADMIN — GABAPENTIN 600 MG: 300 CAPSULE ORAL at 14:31

## 2024-04-20 RX ADMIN — ENOXAPARIN SODIUM 40 MG: 40 INJECTION SUBCUTANEOUS at 14:30

## 2024-04-20 RX ADMIN — MEROPENEM 1 G: 1 INJECTION, POWDER, FOR SOLUTION INTRAVENOUS at 17:42

## 2024-04-20 RX ADMIN — Medication 1 CAPSULE: at 20:47

## 2024-04-20 RX ADMIN — METHIMAZOLE 5 MG: 5 TABLET ORAL at 22:45

## 2024-04-20 RX ADMIN — GABAPENTIN 600 MG: 300 CAPSULE ORAL at 20:47

## 2024-04-20 RX ADMIN — Medication 1 CAPSULE: at 14:31

## 2024-04-20 RX ADMIN — METHIMAZOLE 5 MG: 5 TABLET ORAL at 14:31

## 2024-04-20 RX ADMIN — CARBAMAZEPINE 200 MG: 200 TABLET ORAL at 08:22

## 2024-04-20 RX ADMIN — METHIMAZOLE 5 MG: 5 TABLET ORAL at 05:51

## 2024-04-20 RX ADMIN — BACLOFEN 10 MG: 10 TABLET ORAL at 20:48

## 2024-04-20 RX ADMIN — METOPROLOL SUCCINATE 25 MG: 25 TABLET, EXTENDED RELEASE ORAL at 17:42

## 2024-04-20 RX ADMIN — Medication 1 CAPSULE: at 08:22

## 2024-04-20 RX ADMIN — MEROPENEM 1 G: 1 INJECTION, POWDER, FOR SOLUTION INTRAVENOUS at 08:22

## 2024-04-20 RX ADMIN — METOPROLOL SUCCINATE 25 MG: 25 TABLET, EXTENDED RELEASE ORAL at 08:22

## 2024-04-20 RX ADMIN — ALBUTEROL SULFATE 2.5 MG: 2.5 SOLUTION RESPIRATORY (INHALATION) at 21:11

## 2024-04-20 RX ADMIN — CARBAMAZEPINE 200 MG: 200 TABLET ORAL at 20:47

## 2024-04-20 RX ADMIN — BACLOFEN 10 MG: 10 TABLET ORAL at 08:22

## 2024-04-20 ASSESSMENT — COGNITIVE AND FUNCTIONAL STATUS - GENERAL
DRESSING REGULAR LOWER BODY CLOTHING: TOTAL
DAILY ACTIVITIY SCORE: 12
TURNING FROM BACK TO SIDE WHILE IN FLAT BAD: TOTAL
WALKING IN HOSPITAL ROOM: TOTAL
TOILETING: TOTAL
STANDING UP FROM CHAIR USING ARMS: TOTAL
MOVING FROM LYING ON BACK TO SITTING ON SIDE OF FLAT BED WITH BEDRAILS: A LOT
MOVING TO AND FROM BED TO CHAIR: TOTAL
CLIMB 3 TO 5 STEPS WITH RAILING: TOTAL
DRESSING REGULAR UPPER BODY CLOTHING: TOTAL
HELP NEEDED FOR BATHING: TOTAL
MOBILITY SCORE: 7

## 2024-04-20 ASSESSMENT — ACTIVITIES OF DAILY LIVING (ADL): LACK_OF_TRANSPORTATION: NO

## 2024-04-20 ASSESSMENT — PAIN SCALES - GENERAL: PAINLEVEL_OUTOF10: 0 - NO PAIN

## 2024-04-20 ASSESSMENT — PAIN - FUNCTIONAL ASSESSMENT: PAIN_FUNCTIONAL_ASSESSMENT: 0-10

## 2024-04-20 NOTE — PROGRESS NOTES
Pt reviewed in care rounds this morning. Pt not medically ready for discharge. SW met w/ Pt at bedside. SW explained the role of care transitions and completed initial assessment. No concerns. Discharge plan is for Pt to return to [facility] where Pt resides as long-term care resident. Referral has been sent via [facility] and facility is accepting. Pt in agreement. SW to follow.    04/20/24 1501   Discharge Planning   Living Arrangements Alone   Support Systems Friends/neighbors   Assistance Needed full care   Type of Residence Skilled nursing facility   Do you have animals or pets at home? No   Who is requesting discharge planning? Provider   Home or Post Acute Services Post acute facilities (Rehab/SNF/etc)   Type of Post Acute Facility Services Long term care   Patient expects to be discharged to: [facility]   Does the patient need discharge transport arranged? Yes   RoundTrip coordination needed? Yes   Has discharge transport been arranged? No   What day is the transport expected? 04/22/24   Financial Resource Strain   How hard is it for you to pay for the very basics like food, housing, medical care, and heating? Not hard   Housing Stability   In the last 12 months, was there a time when you were not able to pay the mortgage or rent on time? N   In the last 12 months, how many places have you lived? 1   In the last 12 months, was there a time when you did not have a steady place to sleep or slept in a shelter (including now)? N   Transportation Needs   In the past 12 months, has lack of transportation kept you from medical appointments or from getting medications? no   In the past 12 months, has lack of transportation kept you from meetings, work, or from getting things needed for daily living? No   Patient Choice   Provider Choice list and CMS website (https://medicare.gov/care-compare#search) for post-acute Quality and Resource Measure Data were provided and reviewed with: Family   Patient / Family choosing to utilize agency  / facility established prior to hospitalization Yes

## 2024-04-20 NOTE — CARE PLAN
Problem: Pain  Goal: My pain/discomfort is manageable  Outcome: Met     Problem: Safety  Goal: Patient will be injury free during hospitalization  Outcome: Met  Goal: I will remain free of falls  Outcome: Met     Problem: Daily Care  Goal: Daily care needs are met  Outcome: Met   The patient's goals for the shift include use the call light    The clinical goals for the shift include no falls

## 2024-04-20 NOTE — PROGRESS NOTES
Raheem Hilton is a 64 y.o. male on day 1 of admission presenting with Urinary tract infection with hematuria, site unspecified.      Subjective   Day 2 IV meropenem    Patient seen and examined this morning voicing no complaints overnight or this morning       Objective     Last Recorded Vitals  /81   Pulse (!) 125   Temp 37.3 °C (99.1 °F) (Temporal)   Resp 18   Wt 79 kg (174 lb 1.6 oz)   SpO2 96%   Intake/Output last 3 Shifts:    Intake/Output Summary (Last 24 hours) at 4/20/2024 1608  Last data filed at 4/20/2024 1400  Gross per 24 hour   Intake 820 ml   Output 650 ml   Net 170 ml       Admission Weight  Weight: 79.2 kg (174 lb 8 oz) (04/19/24 0735)    Daily Weight  04/19/24 : 79 kg (174 lb 1.6 oz)    Image Results  ECG 12 lead  Sinus rhythm with 1st degree AV block  Otherwise normal ECG  When compared with ECG of 08-JAN-2024 17:27,  WI interval has increased  Vent. rate has decreased BY  53 BPM  QRS duration has increased  ST no longer depressed in Anterolateral leads  See ED provider note for full interpretation and clinical correlation  Confirmed by Stephanie Ya ([id number]) on 4/20/2024 11:15:30 AM      Physical Exam  Constitutional:       General: He is not in acute distress.     Appearance: Normal appearance. He is normal weight. He is not ill-appearing, toxic-appearing or diaphoretic.   HENT:      Head: Normocephalic.   Neck:      Vascular: No carotid bruit.   Cardiovascular:      Rate and Rhythm: Normal rate and regular rhythm.      Pulses: Normal pulses.      Heart sounds: Normal heart sounds. No murmur heard.  Pulmonary:      Effort: Pulmonary effort is normal. No respiratory distress.      Breath sounds: No wheezing or rhonchi.   Abdominal:      General: Abdomen is flat. Bowel sounds are normal.      Palpations: Abdomen is soft. There is no mass.      Tenderness: There is no abdominal tenderness.   Musculoskeletal:      Cervical back: Normal range of motion and neck supple. No rigidity or  tenderness.   Lymphadenopathy:      Cervical: No cervical adenopathy.   Skin:     General: Skin is warm and dry.   Neurological:      General: No focal deficit present.      Mental Status: He is alert and oriented to person, place, and time. Mental status is at baseline.      Cranial Nerves: No cranial nerve deficit.   Psychiatric:         Mood and Affect: Mood normal.         Thought Content: Thought content normal.         Judgment: Judgment normal.         Relevant Results      Results for orders placed or performed during the hospital encounter of 04/19/24 (from the past 24 hour(s))   CBC   Result Value Ref Range    WBC 24.7 (H) 4.4 - 11.3 x10*3/uL    nRBC 0.0 0.0 - 0.0 /100 WBCs    RBC 4.60 4.50 - 5.90 x10*6/uL    Hemoglobin 14.0 13.5 - 17.5 g/dL    Hematocrit 42.8 41.0 - 52.0 %    MCV 93 80 - 100 fL    MCH 30.4 26.0 - 34.0 pg    MCHC 32.7 32.0 - 36.0 g/dL    RDW 14.8 (H) 11.5 - 14.5 %    Platelets 309 150 - 450 x10*3/uL   Basic metabolic panel   Result Value Ref Range    Glucose 101 (H) 74 - 99 mg/dL    Sodium 133 (L) 136 - 145 mmol/L    Potassium 3.7 3.5 - 5.3 mmol/L    Chloride 101 98 - 107 mmol/L    Bicarbonate 24 21 - 32 mmol/L    Anion Gap 12 10 - 20 mmol/L    Urea Nitrogen 19 6 - 23 mg/dL    Creatinine 1.00 0.50 - 1.30 mg/dL    eGFR 83 >60 mL/min/1.73m*2    Calcium 8.6 8.6 - 10.3 mg/dL   Carbamazepine level, total   Result Value Ref Range    Carbamazepine  7.7 4.0 - 12.0 ug/mL                Assessment/Plan    64-year-old male admitted for recurrent UTI and 104 blood cultures with gram-negative rods.  Patient has a history of MS with paraplegia and neurogenic bladder with chronic suprapubic [place], recurrent UTIs, TIA, COPD, chronic wound on buttocks, chronic hyponatremia and hypertension.      Principal Problem:    Urinary tract infection with hematuria, site unspecified  Bacteremia with 1 of 4 blood cultures with gram-negative rods  Sacral decubitus ulcer chronic  Chronic [place]  MS    PLAN:  1.   Continue IV meropenem day 2  2.  Follow-up urine culture and blood cultures  3.  Lab for a.m.  4.  Will change out [place] today  5.  Maintain methimazole, atorvastatin, baclofen, Tegretol and Neurontin plus lactobacillus  6.  Wound care per previous orders  7.  DVT prophylaxis with SCDs  8.  DNRCCA status with no intubation         Norm De La Torre, DO

## 2024-04-21 LAB
AMORPH CRY #/AREA UR COMP ASSIST: ABNORMAL /HPF
ANION GAP SERPL CALC-SCNC: 11 MMOL/L (ref 10–20)
APPEARANCE UR: ABNORMAL
BACTERIA UR CULT: ABNORMAL
BASOPHILS # BLD AUTO: 0.04 X10*3/UL (ref 0–0.1)
BASOPHILS NFR BLD AUTO: 0.3 %
BILIRUB UR STRIP.AUTO-MCNC: NEGATIVE MG/DL
BUN SERPL-MCNC: 18 MG/DL (ref 6–23)
CALCIUM SERPL-MCNC: 7.9 MG/DL (ref 8.6–10.3)
CHLORIDE SERPL-SCNC: 102 MMOL/L (ref 98–107)
CO2 SERPL-SCNC: 21 MMOL/L (ref 21–32)
COLOR UR: YELLOW
CREAT SERPL-MCNC: 0.77 MG/DL (ref 0.5–1.3)
EGFRCR SERPLBLD CKD-EPI 2021: >90 ML/MIN/1.73M*2
EOSINOPHIL # BLD AUTO: 0.09 X10*3/UL (ref 0–0.7)
EOSINOPHIL NFR BLD AUTO: 0.6 %
ERYTHROCYTE [DISTWIDTH] IN BLOOD BY AUTOMATED COUNT: 14.8 % (ref 11.5–14.5)
GLUCOSE SERPL-MCNC: 111 MG/DL (ref 74–99)
GLUCOSE UR STRIP.AUTO-MCNC: NEGATIVE MG/DL
HCT VFR BLD AUTO: 36.9 % (ref 41–52)
HGB BLD-MCNC: 12 G/DL (ref 13.5–17.5)
HOLD SPECIMEN: NORMAL
HOLD SPECIMEN: NORMAL
IMM GRANULOCYTES # BLD AUTO: 0.09 X10*3/UL (ref 0–0.7)
IMM GRANULOCYTES NFR BLD AUTO: 0.6 % (ref 0–0.9)
KETONES UR STRIP.AUTO-MCNC: ABNORMAL MG/DL
LEUKOCYTE ESTERASE UR QL STRIP.AUTO: ABNORMAL
LYMPHOCYTES # BLD AUTO: 1.36 X10*3/UL (ref 1.2–4.8)
LYMPHOCYTES NFR BLD AUTO: 8.6 %
MCH RBC QN AUTO: 30.5 PG (ref 26–34)
MCHC RBC AUTO-ENTMCNC: 32.5 G/DL (ref 32–36)
MCV RBC AUTO: 94 FL (ref 80–100)
MONOCYTES # BLD AUTO: 1.7 X10*3/UL (ref 0.1–1)
MONOCYTES NFR BLD AUTO: 10.8 %
MUCOUS THREADS #/AREA URNS AUTO: ABNORMAL /LPF
NEUTROPHILS # BLD AUTO: 12.45 X10*3/UL (ref 1.2–7.7)
NEUTROPHILS NFR BLD AUTO: 79.1 %
NITRITE UR QL STRIP.AUTO: NEGATIVE
NRBC BLD-RTO: 0 /100 WBCS (ref 0–0)
PH UR STRIP.AUTO: 7 [PH]
PLATELET # BLD AUTO: 254 X10*3/UL (ref 150–450)
POTASSIUM SERPL-SCNC: 3.9 MMOL/L (ref 3.5–5.3)
PROT UR STRIP.AUTO-MCNC: ABNORMAL MG/DL
RBC # BLD AUTO: 3.93 X10*6/UL (ref 4.5–5.9)
RBC # UR STRIP.AUTO: ABNORMAL /UL
RBC #/AREA URNS AUTO: >20 /HPF
SODIUM SERPL-SCNC: 130 MMOL/L (ref 136–145)
SP GR UR STRIP.AUTO: 1.02
UROBILINOGEN UR STRIP.AUTO-MCNC: 4 MG/DL
WBC # BLD AUTO: 15.7 X10*3/UL (ref 4.4–11.3)
WBC #/AREA URNS AUTO: >50 /HPF

## 2024-04-21 PROCEDURE — 2500000001 HC RX 250 WO HCPCS SELF ADMINISTERED DRUGS (ALT 637 FOR MEDICARE OP): Performed by: NURSE PRACTITIONER

## 2024-04-21 PROCEDURE — 36415 COLL VENOUS BLD VENIPUNCTURE: CPT | Performed by: NURSE PRACTITIONER

## 2024-04-21 PROCEDURE — 99232 SBSQ HOSP IP/OBS MODERATE 35: CPT | Performed by: NURSE PRACTITIONER

## 2024-04-21 PROCEDURE — 36415 COLL VENOUS BLD VENIPUNCTURE: CPT | Performed by: INTERNAL MEDICINE

## 2024-04-21 PROCEDURE — 85025 COMPLETE CBC W/AUTO DIFF WBC: CPT | Performed by: INTERNAL MEDICINE

## 2024-04-21 PROCEDURE — 94640 AIRWAY INHALATION TREATMENT: CPT

## 2024-04-21 PROCEDURE — 2500000001 HC RX 250 WO HCPCS SELF ADMINISTERED DRUGS (ALT 637 FOR MEDICARE OP): Performed by: INTERNAL MEDICINE

## 2024-04-21 PROCEDURE — 2500000004 HC RX 250 GENERAL PHARMACY W/ HCPCS (ALT 636 FOR OP/ED): Performed by: NURSE PRACTITIONER

## 2024-04-21 PROCEDURE — 81003 URINALYSIS AUTO W/O SCOPE: CPT | Performed by: HOSPITALIST

## 2024-04-21 PROCEDURE — 1200000002 HC GENERAL ROOM WITH TELEMETRY DAILY

## 2024-04-21 PROCEDURE — 2500000002 HC RX 250 W HCPCS SELF ADMINISTERED DRUGS (ALT 637 FOR MEDICARE OP, ALT 636 FOR OP/ED): Performed by: NURSE PRACTITIONER

## 2024-04-21 PROCEDURE — 87040 BLOOD CULTURE FOR BACTERIA: CPT | Mod: SAMLAB | Performed by: NURSE PRACTITIONER

## 2024-04-21 PROCEDURE — 80048 BASIC METABOLIC PNL TOTAL CA: CPT | Performed by: INTERNAL MEDICINE

## 2024-04-21 RX ORDER — BISACODYL 10 MG/1
10 SUPPOSITORY RECTAL DAILY PRN
Status: DISCONTINUED | OUTPATIENT
Start: 2024-04-21 | End: 2024-04-23 | Stop reason: HOSPADM

## 2024-04-21 RX ADMIN — METHIMAZOLE 5 MG: 5 TABLET ORAL at 21:00

## 2024-04-21 RX ADMIN — METHIMAZOLE 5 MG: 5 TABLET ORAL at 06:53

## 2024-04-21 RX ADMIN — POLYETHYLENE GLYCOL 3350 17 G: 17 POWDER, FOR SOLUTION ORAL at 09:41

## 2024-04-21 RX ADMIN — ENOXAPARIN SODIUM 40 MG: 40 INJECTION SUBCUTANEOUS at 14:48

## 2024-04-21 RX ADMIN — METHIMAZOLE 5 MG: 5 TABLET ORAL at 14:49

## 2024-04-21 RX ADMIN — GABAPENTIN 600 MG: 300 CAPSULE ORAL at 14:49

## 2024-04-21 RX ADMIN — Medication 1 CAPSULE: at 21:00

## 2024-04-21 RX ADMIN — MEROPENEM 1 G: 1 INJECTION, POWDER, FOR SOLUTION INTRAVENOUS at 17:26

## 2024-04-21 RX ADMIN — ACETAMINOPHEN 650 MG: 325 TABLET ORAL at 00:24

## 2024-04-21 RX ADMIN — BACLOFEN 10 MG: 10 TABLET ORAL at 09:40

## 2024-04-21 RX ADMIN — BACLOFEN 10 MG: 10 TABLET ORAL at 21:00

## 2024-04-21 RX ADMIN — GABAPENTIN 600 MG: 300 CAPSULE ORAL at 09:40

## 2024-04-21 RX ADMIN — ATORVASTATIN CALCIUM 80 MG: 80 TABLET, FILM COATED ORAL at 21:00

## 2024-04-21 RX ADMIN — GABAPENTIN 600 MG: 300 CAPSULE ORAL at 21:00

## 2024-04-21 RX ADMIN — Medication 1 CAPSULE: at 09:40

## 2024-04-21 RX ADMIN — ALBUTEROL SULFATE 2.5 MG: 2.5 SOLUTION RESPIRATORY (INHALATION) at 21:16

## 2024-04-21 RX ADMIN — METOPROLOL SUCCINATE 50 MG: 50 TABLET, EXTENDED RELEASE ORAL at 09:40

## 2024-04-21 RX ADMIN — Medication 1 CAPSULE: at 14:48

## 2024-04-21 RX ADMIN — MEROPENEM 1 G: 1 INJECTION, POWDER, FOR SOLUTION INTRAVENOUS at 01:38

## 2024-04-21 RX ADMIN — CARBAMAZEPINE 200 MG: 200 TABLET ORAL at 09:41

## 2024-04-21 RX ADMIN — CARBAMAZEPINE 200 MG: 200 TABLET ORAL at 21:00

## 2024-04-21 RX ADMIN — MEROPENEM 1 G: 1 INJECTION, POWDER, FOR SOLUTION INTRAVENOUS at 09:40

## 2024-04-21 ASSESSMENT — COGNITIVE AND FUNCTIONAL STATUS - GENERAL
DRESSING REGULAR UPPER BODY CLOTHING: TOTAL
HELP NEEDED FOR BATHING: TOTAL
TOILETING: TOTAL
CLIMB 3 TO 5 STEPS WITH RAILING: TOTAL
MOVING FROM LYING ON BACK TO SITTING ON SIDE OF FLAT BED WITH BEDRAILS: TOTAL
WALKING IN HOSPITAL ROOM: TOTAL
DRESSING REGULAR LOWER BODY CLOTHING: TOTAL
PERSONAL GROOMING: A LITTLE
MOBILITY SCORE: 6
TURNING FROM BACK TO SIDE WHILE IN FLAT BAD: TOTAL
DAILY ACTIVITIY SCORE: 11
MOVING TO AND FROM BED TO CHAIR: TOTAL
STANDING UP FROM CHAIR USING ARMS: TOTAL

## 2024-04-21 ASSESSMENT — PAIN - FUNCTIONAL ASSESSMENT
PAIN_FUNCTIONAL_ASSESSMENT: 0-10

## 2024-04-21 ASSESSMENT — PAIN SCALES - GENERAL
PAINLEVEL_OUTOF10: 0 - NO PAIN
PAINLEVEL_OUTOF10: 3
PAINLEVEL_OUTOF10: 1

## 2024-04-21 ASSESSMENT — PAIN DESCRIPTION - LOCATION: LOCATION: GENERALIZED

## 2024-04-21 NOTE — PROGRESS NOTES
Raheem Hilton is a 64 y.o. male on day 2 of admission presenting with Urinary tract infection with hematuria, site unspecified.      Subjective     Patient seen and examined today.  Resting in bed with no significant complaints.  He did state that he recently has a bowel movement 3 days, reports that he has not had 1 in over 4 days.  MiraLAX given today.  Educated patient as needed suppository to be ordered in the event he does not have a bowel movement he can ask for it.  Indwelling urinary catheter in place draining dark yellow urine.  Some sediment noted.            Objective     Last Recorded Vitals  /72   Pulse 76   Temp 36.6 °C (97.9 °F)   Resp 18   Wt 79 kg (174 lb 1.6 oz)   SpO2 96%   Intake/Output last 3 Shifts:    Intake/Output Summary (Last 24 hours) at 4/21/2024 0832  Last data filed at 4/21/2024 0300  Gross per 24 hour   Intake 1360 ml   Output 775 ml   Net 585 ml       Admission Weight  Weight: 79.2 kg (174 lb 8 oz) (04/19/24 0735)    Daily Weight  04/19/24 : 79 kg (174 lb 1.6 oz)    Image Results  ECG 12 lead  Sinus rhythm with 1st degree AV block  Otherwise normal ECG  When compared with ECG of 08-JAN-2024 17:27,  CA interval has increased  Vent. rate has decreased BY  53 BPM  QRS duration has increased  ST no longer depressed in Anterolateral leads  See ED provider note for full interpretation and clinical correlation  Confirmed by Stephanie Ya ([id number]) on 4/20/2024 11:15:30 AM      Physical Exam  Constitutional:       General: He is not in acute distress.     Appearance: Normal appearance. He is normal weight. He is not ill-appearing, toxic-appearing or diaphoretic.   HENT:      Head: Normocephalic.   Neck:      Vascular: No carotid bruit.   Cardiovascular:      Rate and Rhythm: Normal rate and regular rhythm.      Pulses: Normal pulses.      Heart sounds: Normal heart sounds. No murmur heard.  Pulmonary:      Effort: Pulmonary effort is normal. No respiratory distress.      Breath  sounds: No wheezing or rhonchi.   Abdominal:      General: Abdomen is flat. Bowel sounds are normal.      Palpations: Abdomen is soft. There is no mass.      Tenderness: There is no abdominal tenderness.   Musculoskeletal:      Cervical back: Normal range of motion and neck supple. No rigidity or tenderness.   Lymphadenopathy:      Cervical: No cervical adenopathy.   Skin:     General: Skin is warm and dry.   Neurological:      General: No focal deficit present.      Mental Status: He is alert and oriented to person, place, and time. Mental status is at baseline.      Cranial Nerves: No cranial nerve deficit.   Psychiatric:         Mood and Affect: Mood normal.         Thought Content: Thought content normal.         Judgment: Judgment normal.         Relevant Results      Results for orders placed or performed during the hospital encounter of 04/19/24 (from the past 24 hour(s))   Urinalysis with Reflex Microscopic   Result Value Ref Range    Color, Urine Yellow Straw, Yellow    Appearance, Urine Hazy (N) Clear    Specific Gravity, Urine 1.020 1.005 - 1.035    pH, Urine 7.0 5.0, 5.5, 6.0, 6.5, 7.0, 7.5, 8.0    Protein, Urine 100 (2+) (N) NEGATIVE mg/dL    Glucose, Urine NEGATIVE NEGATIVE mg/dL    Blood, Urine MODERATE (2+) (A) NEGATIVE    Ketones, Urine 5 (TRACE) (A) NEGATIVE mg/dL    Bilirubin, Urine NEGATIVE NEGATIVE    Urobilinogen, Urine 4.0 (N) <2.0 mg/dL    Nitrite, Urine NEGATIVE NEGATIVE    Leukocyte Esterase, Urine MODERATE (2+) (A) NEGATIVE   Microscopic Only, Urine   Result Value Ref Range    WBC, Urine >50 (A) 1-5, NONE /HPF    RBC, Urine >20 (A) NONE, 1-2, 3-5 /HPF    Mucus, Urine 1+ Reference range not established. /LPF    Amorphous Crystals, Urine 1+ NONE, 1+, 2+ /HPF   CBC and Auto Differential   Result Value Ref Range    WBC 15.7 (H) 4.4 - 11.3 x10*3/uL    nRBC 0.0 0.0 - 0.0 /100 WBCs    RBC 3.93 (L) 4.50 - 5.90 x10*6/uL    Hemoglobin 12.0 (L) 13.5 - 17.5 g/dL    Hematocrit 36.9 (L) 41.0 - 52.0 %     MCV 94 80 - 100 fL    MCH 30.5 26.0 - 34.0 pg    MCHC 32.5 32.0 - 36.0 g/dL    RDW 14.8 (H) 11.5 - 14.5 %    Platelets 254 150 - 450 x10*3/uL    Neutrophils % 79.1 40.0 - 80.0 %    Immature Granulocytes %, Automated 0.6 0.0 - 0.9 %    Lymphocytes % 8.6 13.0 - 44.0 %    Monocytes % 10.8 2.0 - 10.0 %    Eosinophils % 0.6 0.0 - 6.0 %    Basophils % 0.3 0.0 - 2.0 %    Neutrophils Absolute 12.45 (H) 1.20 - 7.70 x10*3/uL    Immature Granulocytes Absolute, Automated 0.09 0.00 - 0.70 x10*3/uL    Lymphocytes Absolute 1.36 1.20 - 4.80 x10*3/uL    Monocytes Absolute 1.70 (H) 0.10 - 1.00 x10*3/uL    Eosinophils Absolute 0.09 0.00 - 0.70 x10*3/uL    Basophils Absolute 0.04 0.00 - 0.10 x10*3/uL   Basic Metabolic Panel   Result Value Ref Range    Glucose 111 (H) 74 - 99 mg/dL    Sodium 130 (L) 136 - 145 mmol/L    Potassium 3.9 3.5 - 5.3 mmol/L    Chloride 102 98 - 107 mmol/L    Bicarbonate 21 21 - 32 mmol/L    Anion Gap 11 10 - 20 mmol/L    Urea Nitrogen 18 6 - 23 mg/dL    Creatinine 0.77 0.50 - 1.30 mg/dL    eGFR >90 >60 mL/min/1.73m*2    Calcium 7.9 (L) 8.6 - 10.3 mg/dL                Assessment/Plan    64-year-old male admitted for recurrent UTI and 104 blood cultures with gram-negative rods.  Patient has a history of MS with paraplegia and neurogenic bladder with chronic suprapubic Living[place], recurrent UTIs, TIA, COPD, chronic wound on buttocks, chronic hyponatremia and hypertension.      Principal Problem:    Urinary tract infection with hematuria, site unspecified  Bacteremia with 1 of 4 blood cultures with gram-negative rods  Constipation  Sacral decubitus ulcer chronic  Chronic Livingston  MS  Chronic Hyponatremia     PLAN  Labs reviewed. CBC and BMP in am   Blood Cx x 1 (+) Gram negative bacteria. Blood Cx x 1 with no growth.   Repeat cultures obtained today  UA/C&S prelim with gram (-) bacilli.  Continues on IV abx. On Day 3 of IV meropenem  Continue indwelling urinary catheter due to chronic urine retention  Indwelling urinary  catheter was changed yesterday by nursing  Continue Miralax every day. Add dulcolax suppository as needed daily  Continue home meds as appropriate  Wound care per previous orders  DVT prophylaxis with SCDs  TCC for DC planning     Code status: DNRCCA status with no intubation         Kimberly Herbert, APRN-CNP

## 2024-04-21 NOTE — CARE PLAN
The patient's goals for the shift include use the call light    The clinical goals for the shift include Pt will remain comfortable    Over the shift, the patient remained a/o X3 and remained comfortable.  Used call light appropriately.

## 2024-04-22 LAB
ANION GAP SERPL CALC-SCNC: 11 MMOL/L
BUN SERPL-MCNC: 12 MG/DL (ref 6–23)
CALCIUM SERPL-MCNC: 8.6 MG/DL (ref 8.6–10.3)
CHLORIDE SERPL-SCNC: 97 MMOL/L (ref 98–107)
CO2 SERPL-SCNC: 23 MMOL/L (ref 21–32)
CREAT SERPL-MCNC: 0.65 MG/DL (ref 0.5–1.3)
EGFRCR SERPLBLD CKD-EPI 2021: >90 ML/MIN/1.73M*2
ERYTHROCYTE [DISTWIDTH] IN BLOOD BY AUTOMATED COUNT: 14.6 % (ref 11.5–14.5)
GLUCOSE SERPL-MCNC: 95 MG/DL (ref 74–99)
HCT VFR BLD AUTO: 36.4 % (ref 41–52)
HGB BLD-MCNC: 12.1 G/DL (ref 13.5–17.5)
MCH RBC QN AUTO: 30.7 PG (ref 26–34)
MCHC RBC AUTO-ENTMCNC: 33.2 G/DL (ref 32–36)
MCV RBC AUTO: 92 FL (ref 80–100)
NRBC BLD-RTO: 0 /100 WBCS (ref 0–0)
PLATELET # BLD AUTO: 264 X10*3/UL (ref 150–450)
POTASSIUM SERPL-SCNC: 4 MMOL/L (ref 3.5–5.3)
RBC # BLD AUTO: 3.94 X10*6/UL (ref 4.5–5.9)
SODIUM SERPL-SCNC: 127 MMOL/L (ref 136–145)
WBC # BLD AUTO: 12.8 X10*3/UL (ref 4.4–11.3)

## 2024-04-22 PROCEDURE — 99232 SBSQ HOSP IP/OBS MODERATE 35: CPT | Performed by: NURSE PRACTITIONER

## 2024-04-22 PROCEDURE — 2500000004 HC RX 250 GENERAL PHARMACY W/ HCPCS (ALT 636 FOR OP/ED): Performed by: NURSE PRACTITIONER

## 2024-04-22 PROCEDURE — 2500000001 HC RX 250 WO HCPCS SELF ADMINISTERED DRUGS (ALT 637 FOR MEDICARE OP): Performed by: PHARMACIST

## 2024-04-22 PROCEDURE — 36415 COLL VENOUS BLD VENIPUNCTURE: CPT | Performed by: NURSE PRACTITIONER

## 2024-04-22 PROCEDURE — 2500000001 HC RX 250 WO HCPCS SELF ADMINISTERED DRUGS (ALT 637 FOR MEDICARE OP): Performed by: NURSE PRACTITIONER

## 2024-04-22 PROCEDURE — 80048 BASIC METABOLIC PNL TOTAL CA: CPT | Performed by: NURSE PRACTITIONER

## 2024-04-22 PROCEDURE — 1200000002 HC GENERAL ROOM WITH TELEMETRY DAILY

## 2024-04-22 PROCEDURE — 85027 COMPLETE CBC AUTOMATED: CPT | Performed by: NURSE PRACTITIONER

## 2024-04-22 PROCEDURE — 2500000001 HC RX 250 WO HCPCS SELF ADMINISTERED DRUGS (ALT 637 FOR MEDICARE OP): Performed by: INTERNAL MEDICINE

## 2024-04-22 RX ORDER — MULTIVIT-MIN/IRON FUM/FOLIC AC 7.5 MG-4
1 TABLET ORAL DAILY
Status: DISCONTINUED | OUTPATIENT
Start: 2024-04-22 | End: 2024-04-23 | Stop reason: HOSPADM

## 2024-04-22 RX ORDER — ASCORBIC ACID 500 MG
500 TABLET ORAL DAILY
Status: DISCONTINUED | OUTPATIENT
Start: 2024-04-22 | End: 2024-04-23 | Stop reason: HOSPADM

## 2024-04-22 RX ORDER — LEVOFLOXACIN 5 MG/ML
750 INJECTION, SOLUTION INTRAVENOUS EVERY 24 HOURS
Qty: 450 ML | Refills: 0 | Status: DISCONTINUED | OUTPATIENT
Start: 2024-04-22 | End: 2024-04-23 | Stop reason: HOSPADM

## 2024-04-22 RX ADMIN — GABAPENTIN 600 MG: 300 CAPSULE ORAL at 09:26

## 2024-04-22 RX ADMIN — GABAPENTIN 600 MG: 300 CAPSULE ORAL at 21:13

## 2024-04-22 RX ADMIN — CARBAMAZEPINE 200 MG: 200 TABLET ORAL at 21:14

## 2024-04-22 RX ADMIN — BACLOFEN 10 MG: 10 TABLET ORAL at 21:14

## 2024-04-22 RX ADMIN — MEROPENEM 1 G: 1 INJECTION, POWDER, FOR SOLUTION INTRAVENOUS at 00:49

## 2024-04-22 RX ADMIN — POLYETHYLENE GLYCOL 3350 17 G: 17 POWDER, FOR SOLUTION ORAL at 09:28

## 2024-04-22 RX ADMIN — BACLOFEN 10 MG: 10 TABLET ORAL at 09:27

## 2024-04-22 RX ADMIN — METHIMAZOLE 5 MG: 5 TABLET ORAL at 21:13

## 2024-04-22 RX ADMIN — LEVOFLOXACIN 750 MG: 750 INJECTION, SOLUTION INTRAVENOUS at 17:03

## 2024-04-22 RX ADMIN — CARBAMAZEPINE 200 MG: 200 TABLET ORAL at 09:27

## 2024-04-22 RX ADMIN — METOPROLOL SUCCINATE 50 MG: 50 TABLET, EXTENDED RELEASE ORAL at 09:27

## 2024-04-22 RX ADMIN — METHIMAZOLE 5 MG: 5 TABLET ORAL at 05:29

## 2024-04-22 RX ADMIN — MEROPENEM 1 G: 1 INJECTION, POWDER, FOR SOLUTION INTRAVENOUS at 09:27

## 2024-04-22 RX ADMIN — Medication 1 TABLET: at 12:14

## 2024-04-22 RX ADMIN — Medication 1 CAPSULE: at 14:41

## 2024-04-22 RX ADMIN — ENOXAPARIN SODIUM 40 MG: 40 INJECTION SUBCUTANEOUS at 14:41

## 2024-04-22 RX ADMIN — GABAPENTIN 600 MG: 300 CAPSULE ORAL at 14:41

## 2024-04-22 RX ADMIN — Medication 1 CAPSULE: at 09:26

## 2024-04-22 RX ADMIN — METHIMAZOLE 5 MG: 5 TABLET ORAL at 14:41

## 2024-04-22 RX ADMIN — ATORVASTATIN CALCIUM 80 MG: 80 TABLET, FILM COATED ORAL at 21:13

## 2024-04-22 RX ADMIN — Medication 1 CAPSULE: at 21:13

## 2024-04-22 RX ADMIN — OXYCODONE HYDROCHLORIDE AND ACETAMINOPHEN 500 MG: 500 TABLET ORAL at 12:14

## 2024-04-22 ASSESSMENT — COGNITIVE AND FUNCTIONAL STATUS - GENERAL
MOVING FROM LYING ON BACK TO SITTING ON SIDE OF FLAT BED WITH BEDRAILS: A LOT
DAILY ACTIVITIY SCORE: 11
HELP NEEDED FOR BATHING: TOTAL
TURNING FROM BACK TO SIDE WHILE IN FLAT BAD: TOTAL
CLIMB 3 TO 5 STEPS WITH RAILING: TOTAL
STANDING UP FROM CHAIR USING ARMS: TOTAL
MOVING TO AND FROM BED TO CHAIR: TOTAL
PERSONAL GROOMING: A LITTLE
MOBILITY SCORE: 7
DRESSING REGULAR UPPER BODY CLOTHING: TOTAL
WALKING IN HOSPITAL ROOM: TOTAL
DRESSING REGULAR LOWER BODY CLOTHING: TOTAL
TOILETING: TOTAL

## 2024-04-22 ASSESSMENT — PAIN - FUNCTIONAL ASSESSMENT: PAIN_FUNCTIONAL_ASSESSMENT: 0-10

## 2024-04-22 ASSESSMENT — PAIN SCALES - GENERAL: PAINLEVEL_OUTOF10: 0 - NO PAIN

## 2024-04-22 NOTE — DOCUMENTATION CLARIFICATION NOTE
"    PATIENT:               ARNOLD VO  ACCT #:                  [id number]  MRN:                       [id number]  :                       1960  ADMIT DATE:       2024 7:30 AM  DISCH DATE:  RESPONDING PROVIDER #:        [id number]          PROVIDER RESPONSE TEXT:    UTI related to  supra-pubic catheter    CDI QUERY TEXT:    Clarification    Instruction:    Based on your assessment of the patient and the clinical information, please provide the requested documentation by clicking on the appropriate radio button and enter any additional information if prompted.    Question: Please further clarify the relationship between the UTI and the supra-pubic catheter    When answering this query, please exercise your independent professional judgment. The fact that a question is being asked, does not imply that any particular answer is desired or expected.    The patient's clinical indicators include:  Clinical Information: 64 y.o. male presenting with Urinary tract infection with hematuria,    Clinical Indicators: History and physical notes \"Patient is prone to urinary tract infection given his suprapubic catheter\". Both urine and blood cultures + for Gram negative bacilli    Treatment: Admission, IV Merrem and cultures    Risk Factors: Chronic supra-pubic catheter, MS and recurrent UTI  Options provided:  -- UTI related to supra-pubic catheter  -- UTI unrelated to  supra-pubic catheter  -- Other - I will add my own diagnosis  -- Refer to Clinical Documentation Reviewer    Query created by: Key Colvin on 2024 11:09 AM      Electronically signed by:  ALEXIS DIAL 2024 11:13 AM          "

## 2024-04-22 NOTE — PROGRESS NOTES
Raheem Hilton is a 64 y.o. male on day 3 of admission presenting with Urinary tract infection with hematuria, site unspecified.      Subjective   Pt sitting in bed. He is much more alert today than day of admission. He denies CP, SOB, HA, flank pain,  diarrhea or other complaints.  He reports his appetite is not back to normal. However, he is tolerating POs.        Objective     Last Recorded Vitals  /82 (BP Location: Left arm, Patient Position: Lying)   Pulse 92   Temp 36.8 °C (98.2 °F) (Temporal)   Resp 18   Wt 79 kg (174 lb 1.6 oz)   SpO2 94%   Intake/Output last 3 Shifts:    Intake/Output Summary (Last 24 hours) at 4/22/2024 1118  Last data filed at 4/22/2024 1025  Gross per 24 hour   Intake 1640 ml   Output 3225 ml   Net -1585 ml       Admission Weight  Weight: 79.2 kg (174 lb 8 oz) (04/19/24 0735)    Daily Weight  04/19/24 : 79 kg (174 lb 1.6 oz)    Image Results  ECG 12 lead  Sinus rhythm with 1st degree AV block  Otherwise normal ECG  When compared with ECG of 08-JAN-2024 17:27,  ND interval has increased  Vent. rate has decreased BY  53 BPM  QRS duration has increased  ST no longer depressed in Anterolateral leads  See ED provider note for full interpretation and clinical correlation  Confirmed by Stephanie Ya ([id number]) on 4/20/2024 11:15:30 AM      Physical Exam  Constitutional:       General: He is not in acute distress.     Appearance: Normal appearance. He is normal weight. He is not ill-appearing, toxic-appearing or diaphoretic.   HENT:      Head: Normocephalic.   Neck:      Vascular: No carotid bruit.   Cardiovascular:      Rate and Rhythm: Normal rate and regular rhythm.      Pulses: Normal pulses.      Heart sounds: Normal heart sounds. No murmur heard.  Pulmonary:      Effort: Pulmonary effort is normal. No respiratory distress.      Breath sounds: No wheezing or rhonchi.   Abdominal:      General: Abdomen is flat. Bowel sounds are normal.      Palpations: Abdomen is soft. There is no  mass.      Tenderness: There is no abdominal tenderness. Suprapubic cath intact no drainage or redness around insertion site.   Musculoskeletal:      Cervical back: Normal range of motion and neck supple. No rigidity or tenderness. He does have contractures of lower extremities.   Lymphadenopathy:      Cervical: No cervical adenopathy.   Skin:     General: Skin is warm and dry.   Neurological:      General: No focal deficit present.      Mental Status: He is alert and oriented to person, place, and time. Mental status is at baseline.      Cranial Nerves: No cranial nerve deficit.   Psychiatric:         Mood and Affect: Mood normal.         Thought Content: Thought content normal.         Judgment: Judgment normal.   Relevant Results    Results for orders placed or performed during the hospital encounter of 04/19/24 (from the past 24 hour(s))   CBC   Result Value Ref Range    WBC 12.8 (H) 4.4 - 11.3 x10*3/uL    nRBC 0.0 0.0 - 0.0 /100 WBCs    RBC 3.94 (L) 4.50 - 5.90 x10*6/uL    Hemoglobin 12.1 (L) 13.5 - 17.5 g/dL    Hematocrit 36.4 (L) 41.0 - 52.0 %    MCV 92 80 - 100 fL    MCH 30.7 26.0 - 34.0 pg    MCHC 33.2 32.0 - 36.0 g/dL    RDW 14.6 (H) 11.5 - 14.5 %    Platelets 264 150 - 450 x10*3/uL   Basic metabolic panel   Result Value Ref Range    Glucose 95 74 - 99 mg/dL    Sodium 127 (L) 136 - 145 mmol/L    Potassium 4.0 3.5 - 5.3 mmol/L    Chloride 97 (L) 98 - 107 mmol/L    Bicarbonate 23 21 - 32 mmol/L    Anion Gap 11 mmol/L    Urea Nitrogen 12 6 - 23 mg/dL    Creatinine 0.65 0.50 - 1.30 mg/dL    eGFR >90 >60 mL/min/1.73m*2    Calcium 8.6 8.6 - 10.3 mg/dL      Scheduled medications  atorvastatin, 80 mg, oral, Nightly  baclofen, 10 mg, oral, BID  carBAMazepine, 200 mg, oral, BID  enoxaparin, 40 mg, subcutaneous, q24h  gabapentin, 600 mg, oral, TID  lactobacillus acidophilus, 1 capsule, oral, TID  meropenem, 1 g, intravenous, q8h  methIMAzole, 5 mg, oral, q8h CARMELO  metoprolol succinate XL, 50 mg, oral,  Daily  polyethylene glycol, 17 g, oral, Daily      Continuous medications     PRN medications  PRN medications: acetaminophen, bisacodyl, HYDROcodone-acetaminophen, ondansetron ODT **OR** ondansetron   Assessment/Plan    Raheem Hilton is a 64 y.o. male with PMHx of MS functional paraplegic.  , neurogenic bladder with recurrent UTI, TIA, COPD, Chronic buttock wound, chronic hyponatremia and HTN  presents from local skilled nursing facility with concern for altered mentation. Patient is prone to urinary tract infection given his suprapubic catheter. He is admitted to the inpatient unit for continued treatment. He has what appears to be a recurrent urinary tract infection with an elevated white count.  Does not have other signs of sepsis currently.     Principal Problem:    UTI with hematuria due to indwelling catheter.  -Urine culture +proteus Mirabilis susceptible to Meropenem.     -Bacteremia with 1 of 4 blood cultures with gram-negative rods  -WBC trending down. 12.8 today  -Temperature last 2 nights 101.1  HR 90s follow this.  -Blood cultures reveal Pseudomonas aeruginosa    -DC meropenum IV Start Levaquin 750mg IV Q24h for coverage of both organisms.   -Living[place] changed over the weekend.      Sacral decubitus ulcer-Chronic  -Continue previous wound care orders from Nursing home     Hypotnatremia-Chronic  -range 129-132 Currently 127. 1500ml Fluid restrictions.   -bmp in am     MS  -baclofen for spasticity     Hx of seizures  -On tegretol      Hyperthyroidism  -On methimazole       Disposition: Return to NH 1-2 when medically stable.         Paula Diaz, APRN-CNP

## 2024-04-22 NOTE — PROGRESS NOTES
"Nutrition Initial Assessment:   Nutrition Assessment    Reason for Assessment: Dietitian discretion    Patient is a 64 y.o. male presenting with Urinary tract infection with hematuria, site unspecified.     4/22/24 patient seen - stated decreased appetite with UTI - usually sits in chair to eat as well.  Agreed to Ensure plus HP x2 - will request MVI Vit C and zinc be addeded.      Nutrition History:  Energy Intake: Fair 50-75 %  Food and Nutrient History: Usually eats well at [facility] - likes to sit in wheelchair to eat.  Vitamin/Herbal Supplement Use: stress B with zinc  Food Allergies/Intolerances:  None  GI Symptoms: Constipation  Oral Problems: None       Anthropometrics:  Height: 185.4 cm (6' 0.99\")   Weight: 79 kg (174 lb 1.6 oz)   BMI (Calculated): 22.97  IBW/kg (Dietitian Calculated): 75.6 kg (corrected for paraplegia)  Percent of IBW: 104 %       Weight History:   Daily Weight  04/19/24 : 79 kg (174 lb 1.6 oz)  02/08/24 : 81 kg (178 lb 9.2 oz)  02/07/24 : 78 kg (171 lb 15.3 oz)  01/08/24 : 79.4 kg (175 lb)  09/28/23 : 80.3 kg (177 lb 0.5 oz)  08/04/20 : 90.6 kg (199 lb 12.8 oz)     Weight Change %:  Weight History / % Weight Change: No weight change  Significant Weight Loss: No    Nutrition Focused Physical Exam Findings:    Subcutaneous Fat Loss:   Orbital Fat Pads: Well nourished (slightly bulging fat pads)  Buccal Fat Pads: Well nourished (full, rounded cheeks)  Triceps: Well nourished (ample fat tissue)  Ribs: Well nourished (chest is full, ribs do not show, slight to no protrusion of the iliac crest)  Muscle Wasting:  Temporalis: Mild-Moderate (slight depression)  Pectoralis (Clavicular Region): Mild-Moderate (some protrusion of clavicle)  Deltoid/Trapezius: Well nourished (rounded appearance at arm, shoulder, neck)  Interosseous: Well nourished (muscle bulges)  Trapezius/Infraspinatus/Supraspinatus (Scapular Region): Well nourished (bones not prominent, muscle taut)  Quadriceps: Defer  Gastrocnemius: " Defer  Edema:  Edema: none  Physical Findings:  Hair: Negative  Eyes: Negative  Mouth: Negative  Nails: Negative  Skin: Positive (large PI buttock - no stage noted)    Nutrition Significant Labs:  CBC Trend:   Results from last 7 days   Lab Units 04/22/24  0435 04/21/24 0436 04/20/24 0422 04/19/24  0755   WBC AUTO x10*3/uL 12.8* 15.7* 24.7* 23.0*   RBC AUTO x10*6/uL 3.94* 3.93* 4.60 4.45*   HEMOGLOBIN g/dL 12.1* 12.0* 14.0 13.4*   HEMATOCRIT % 36.4* 36.9* 42.8 41.6   MCV fL 92 94 93 94   PLATELETS AUTO x10*3/uL 264 254 309 288    , Renal Lab Trend:   Results from last 7 days   Lab Units 04/22/24  0435 04/21/24 0436 04/20/24  0422 04/19/24  0755   POTASSIUM mmol/L 4.0 3.9 3.7 3.6   SODIUM mmol/L 127* 130* 133* 129*   EGFR mL/min/1.73m*2 >90 >90 83 86   BUN mg/dL 12 18 19 21   CREATININE mg/dL 0.65 0.77 1.00 0.97    , Vit D:   Lab Results   Component Value Date    VITD25 57 10/06/2022        Nutrition Specific Medications:  Scheduled medications  lactobacillus acidophilus, 1 capsule, oral, TID    polyethylene glycol, 17 g, oral, Daily      I/O:   Last BM Date: 04/19/24;      Dietary Orders (From admission, onward)       Start     Ordered    04/22/24 1131  Adult diet Regular; 1500 mL fluid  Diet effective now        Question Answer Comment   Diet type Regular    Dietary fluid restriction / 24h: 1500 mL fluid        04/22/24 1130    04/22/24 1031  Oral nutritional supplements  Until discontinued        Comments: Vanilla   Question Answer Comment   Deliver with Lunch    Deliver with Dinner    Select supplement: Ensure Plus High Protein        04/22/24 1031                     Estimated Needs:   Total Energy Estimated Needs (kCal): 2370 kCal  Method for Estimating Needs: 30-32 kcal/kg = 5366-6344 kcal  Total Protein Estimated Needs (g): 103 g  Method for Estimating Needs: 1.25-1.5 gm/kg =  gm  Total Fluid Estimated Needs (mL): 2370 mL  Method for Estimating Needs: 30 ml/kg        Nutrition Diagnosis    Malnutrition Diagnosis  Patient has Malnutrition Diagnosis: No    Nutrition Diagnosis  Patient has Nutrition Diagnosis: Yes  Diagnosis Status (1): New  Nutrition Diagnosis 1: Increased nutrient needs  Related to (1): healing process  As Evidenced by (1): large pressure injury buttock  Additional Nutrition Diagnosis: Diagnosis 2  Diagnosis Status (2): New  Nutrition Diagnosis 2: Altered GI function  Related to (2): paralysis  As Evidenced by (2): constipation - has Ducolax and Miralax ordered       Nutrition Interventions/Recommendations         Nutrition Prescription:  Individualized Nutrition Prescription Provided for : Oral nutrition        Nutrition Interventions:   Interventions: Meals and snacks, Vitamin supplement therapy, Mineral supplement therapy, Medical food supplement  Meals and Snacks: General healthful diet  Goal: Provide regular diet  Medical Food Supplement: Commercial beverage  Goal: Ensure plus HP x 2 = 700 kcal 40 gm protein  Vitamin Supplement Therapy: C, Other (Comment)  Goal: Recommend Vitamin C and MIV - PI  Mineral Supplement Therapy: Zinc  Goal: Recommend Zinc 10 mg/day PI    Collaboration and Referral of Nutrition Care: Team meeting involving nutrition professional  Goal: IDT team meeting    Nutrition Education:  Vitamins and Protein needs with PI       Nutrition Monitoring and Evaluation   Food/Nutrient Related History Monitoring  Monitoring and Evaluation Plan: Amount of food  Amount of Food: Estimated amout of food, Medical food intake  Criteria: Will consume >75% meals and Ensure plus HP    Body Composition/Growth/Weight History  Monitoring and Evaluation Plan: Weight  Weight: Measured weight  Criteria: Maintain wt    Biochemical Data, Medical Tests and Procedures  Monitoring and Evaluation Plan: Electrolyte/renal panel  Electrolyte and Renal Panel: Sodium  Criteria: Improvement    Nutrition Focused Physical Findings  Monitoring and Evaluation Plan: Skin  Skin: Impaired wound  healing  Criteria: Pressure injury healing       Time Spent/Follow-up Reminder:   Time Spent (min): 30 minutes  Last Date of Nutrition Visit: 04/22/24  Nutrition Follow-Up Needed?: 5-7 days  Follow up Comment: po, wounds    Dhara Hairston RDN, LD

## 2024-04-22 NOTE — PROGRESS NOTES
Pt reviewed during Care Rounds today and he is not ready for discharge; anticipate discharge in 24-48.  Updates attached in [facility] for [facility] where pt will return for long-term care.  Care Transitions will continue to follow.       CEDRIC Jain

## 2024-04-22 NOTE — CARE PLAN
Problem: Psychosocial Needs  Goal: Demonstrates ability to cope with hospitalization/illness  Outcome: Progressing  Goal: Collaborate with me, my family, and caregiver to identify my specific goals  Outcome: Progressing     Problem: Discharge Barriers  Goal: My discharge needs are met  Outcome: Progressing     Problem: Skin  Goal: Decreased wound size/increased tissue granulation at next dressing change  Outcome: Progressing  Flowsheets (Taken 4/22/2024 0742)  Decreased wound size/increased tissue granulation at next dressing change: Promote sleep for wound healing  Goal: Participates in plan/prevention/treatment measures  Outcome: Progressing  Flowsheets (Taken 4/22/2024 0742)  Participates in plan/prevention/treatment measures: Discuss with provider PT/OT consult  Goal: Prevent/manage excess moisture  Outcome: Progressing  Flowsheets (Taken 4/22/2024 0742)  Prevent/manage excess moisture: Moisturize dry skin  Goal: Prevent/minimize sheer/friction injuries  Outcome: Progressing  Flowsheets (Taken 4/22/2024 0742)  Prevent/minimize sheer/friction injuries: Use pull sheet  Goal: Promote/optimize nutrition  Outcome: Progressing  Flowsheets (Taken 4/22/2024 0742)  Promote/optimize nutrition: Consume > 50% meals/supplements  Goal: Promote skin healing  Outcome: Progressing  Flowsheets (Taken 4/22/2024 0742)  Promote skin healing: Turn/reposition every 2 hours/use positioning/transfer devices   The patient's goals for the shift include use the call light    The clinical goals for the shift include Pt will remain comfortable    Over the shift, the patient did not make progress toward the following goals. Barriers to progression include . Recommendations to address these barriers include .

## 2024-04-23 VITALS
OXYGEN SATURATION: 94 % | HEIGHT: 73 IN | DIASTOLIC BLOOD PRESSURE: 80 MMHG | BODY MASS INDEX: 23.07 KG/M2 | TEMPERATURE: 98 F | HEART RATE: 62 BPM | WEIGHT: 174.1 LBS | SYSTOLIC BLOOD PRESSURE: 114 MMHG | RESPIRATION RATE: 18 BRPM

## 2024-04-23 LAB
ANION GAP SERPL CALC-SCNC: 13 MMOL/L (ref 10–20)
BACTERIA BLD CULT: NORMAL
BACTERIA UR CULT: ABNORMAL
BUN SERPL-MCNC: 15 MG/DL (ref 6–23)
CALCIUM SERPL-MCNC: 8.4 MG/DL (ref 8.6–10.3)
CHLORIDE SERPL-SCNC: 97 MMOL/L (ref 98–107)
CO2 SERPL-SCNC: 22 MMOL/L (ref 21–32)
CREAT SERPL-MCNC: 0.59 MG/DL (ref 0.5–1.3)
EGFRCR SERPLBLD CKD-EPI 2021: >90 ML/MIN/1.73M*2
ERYTHROCYTE [DISTWIDTH] IN BLOOD BY AUTOMATED COUNT: 14.4 % (ref 11.5–14.5)
GLUCOSE SERPL-MCNC: 92 MG/DL (ref 74–99)
HCT VFR BLD AUTO: 37.9 % (ref 41–52)
HGB BLD-MCNC: 12.5 G/DL (ref 13.5–17.5)
MCH RBC QN AUTO: 30 PG (ref 26–34)
MCHC RBC AUTO-ENTMCNC: 33 G/DL (ref 32–36)
MCV RBC AUTO: 91 FL (ref 80–100)
NRBC BLD-RTO: 0 /100 WBCS (ref 0–0)
PLATELET # BLD AUTO: 307 X10*3/UL (ref 150–450)
POTASSIUM SERPL-SCNC: 4 MMOL/L (ref 3.5–5.3)
RBC # BLD AUTO: 4.16 X10*6/UL (ref 4.5–5.9)
SARS-COV-2 RNA RESP QL NAA+PROBE: NOT DETECTED
SODIUM SERPL-SCNC: 128 MMOL/L (ref 136–145)
WBC # BLD AUTO: 11.4 X10*3/UL (ref 4.4–11.3)

## 2024-04-23 PROCEDURE — 2500000004 HC RX 250 GENERAL PHARMACY W/ HCPCS (ALT 636 FOR OP/ED): Performed by: NURSE PRACTITIONER

## 2024-04-23 PROCEDURE — 87635 SARS-COV-2 COVID-19 AMP PRB: CPT | Performed by: NURSE PRACTITIONER

## 2024-04-23 PROCEDURE — 80048 BASIC METABOLIC PNL TOTAL CA: CPT | Performed by: NURSE PRACTITIONER

## 2024-04-23 PROCEDURE — 36415 COLL VENOUS BLD VENIPUNCTURE: CPT | Performed by: NURSE PRACTITIONER

## 2024-04-23 PROCEDURE — 2500000001 HC RX 250 WO HCPCS SELF ADMINISTERED DRUGS (ALT 637 FOR MEDICARE OP): Performed by: INTERNAL MEDICINE

## 2024-04-23 PROCEDURE — 2500000001 HC RX 250 WO HCPCS SELF ADMINISTERED DRUGS (ALT 637 FOR MEDICARE OP): Performed by: PHARMACIST

## 2024-04-23 PROCEDURE — 85027 COMPLETE CBC AUTOMATED: CPT | Performed by: NURSE PRACTITIONER

## 2024-04-23 PROCEDURE — 2500000001 HC RX 250 WO HCPCS SELF ADMINISTERED DRUGS (ALT 637 FOR MEDICARE OP): Performed by: NURSE PRACTITIONER

## 2024-04-23 PROCEDURE — 99231 SBSQ HOSP IP/OBS SF/LOW 25: CPT | Performed by: NURSE PRACTITIONER

## 2024-04-23 RX ORDER — LEVOFLOXACIN 750 MG/1
750 TABLET ORAL DAILY
Start: 2024-04-23 | End: 2024-04-25

## 2024-04-23 RX ORDER — MULTIVIT-MIN/IRON FUM/FOLIC AC 7.5 MG-4
1 TABLET ORAL DAILY
Start: 2024-04-24 | End: 2024-05-24

## 2024-04-23 RX ADMIN — METHIMAZOLE 5 MG: 5 TABLET ORAL at 14:49

## 2024-04-23 RX ADMIN — Medication 1 CAPSULE: at 14:49

## 2024-04-23 RX ADMIN — ENOXAPARIN SODIUM 40 MG: 40 INJECTION SUBCUTANEOUS at 14:49

## 2024-04-23 RX ADMIN — GABAPENTIN 600 MG: 300 CAPSULE ORAL at 08:37

## 2024-04-23 RX ADMIN — POLYETHYLENE GLYCOL 3350 17 G: 17 POWDER, FOR SOLUTION ORAL at 08:37

## 2024-04-23 RX ADMIN — Medication 1 TABLET: at 08:37

## 2024-04-23 RX ADMIN — CARBAMAZEPINE 200 MG: 200 TABLET ORAL at 08:37

## 2024-04-23 RX ADMIN — GABAPENTIN 600 MG: 300 CAPSULE ORAL at 14:49

## 2024-04-23 RX ADMIN — BACLOFEN 10 MG: 10 TABLET ORAL at 08:37

## 2024-04-23 RX ADMIN — OXYCODONE HYDROCHLORIDE AND ACETAMINOPHEN 500 MG: 500 TABLET ORAL at 08:37

## 2024-04-23 RX ADMIN — LEVOFLOXACIN 750 MG: 750 INJECTION, SOLUTION INTRAVENOUS at 14:50

## 2024-04-23 RX ADMIN — METHIMAZOLE 5 MG: 5 TABLET ORAL at 05:48

## 2024-04-23 RX ADMIN — METOPROLOL SUCCINATE 50 MG: 50 TABLET, EXTENDED RELEASE ORAL at 08:37

## 2024-04-23 RX ADMIN — Medication 1 CAPSULE: at 08:37

## 2024-04-23 ASSESSMENT — PAIN SCALES - GENERAL: PAINLEVEL_OUTOF10: 0 - NO PAIN

## 2024-04-23 ASSESSMENT — PAIN - FUNCTIONAL ASSESSMENT: PAIN_FUNCTIONAL_ASSESSMENT: 0-10

## 2024-04-23 NOTE — PROGRESS NOTES
Pt reviewed during Care Rounds today and he is ready for discharge. SW met with pt to review his discharge plan and he confirms his desires to return to [facility] where he resides for long-term care. IM reviewed with pt with no questions/concerns- copy added to pt's chart, pt denied a copy for his own records.  Final updates/orders attached in [facility] and transportation is being coordinated by [facility]. No further needs identified.       CEDRIC Jain

## 2024-04-23 NOTE — DISCHARGE INSTR - OTHER ORDERS
Urinary Tract Infection, Adult ED    General Information  You came to the Emergency Department (ED) for a urinary tract infection or UTI. Most UTIs are infections in either your bladder or your kidneys. Bladder infections are more common and may also be called cystitis. Kidney infections are more serious and may also be called pyelonephritis. You need antibiotics to treat a UTI. It is important to take all of your antibiotics even if you start to feel better.  What care is needed at home?  Call your regular doctor to let them know you were in the ED. Make a follow-up appointment if you were told to.  For the first day or so, you may want to take an over-the-counter medicine, like phenazopyridine. This will help to numb your bladder. You will also not have the strong urge to urinate. This medicine causes your urine and tears to look orange. If you have kidney disease, talk to your doctor before taking this medicine.  To lower your chance of getting a UTI in the future, you can:  Drink extra fluids.  If you have sex, urinate right afterwards.  When do I need to get emergency help?  Return to the ED if:  You have very bad pain in your back, shoulder, or belly.  You have a fever of 102.2°F (39°C); shaking chills or sweats even though you are taking antibiotics.  When do I need to call the doctor?  You have a fever up to 100.4°F (38°C).  You notice more blood in your urine.  Your signs get worse or do not improve within 24 hours of starting treatment.  You are not able to urinate for more than 8 hours  Your signs come back after treatment has stopped.  You have new or worsening symptoms.  Last Reviewed Date  2021-03-12

## 2024-04-23 NOTE — NURSING NOTE
Discharge Note: 4/23/2024 8308 Discharge instructions and pt responsibilities reviewed with pt and copy given. Chest pain, heart cath, education reviewed with pt and information sheets given. Pt verbalizes understanding of instructions received, verbalizes understanding of when to seek medical attention, denies any home going or personal care needs. Denies further questions or concerns. Reviewed follow up appts with pt and verbalizes understanding. Pt states has already received call from PCP and cardiology offices regarding follow up appts. Declines wheelchair, ambulates to private car accompanied by PCT, personal belongings taken with pt, no distress noted, no complaints voicedT.Estefany BENAVIDEZ

## 2024-04-23 NOTE — DISCHARGE SUMMARY
Discharge Diagnosis  Urinary tract infection with hematuria,+proteus Mirabilis     Issues Requiring Follow-Up  Bacteremia - Pseudomonas aeruginosa     Discharge Meds     Your medication list        START taking these medications        Instructions Last Dose Given Next Dose Due   levoFLOXacin 750 mg tablet  Commonly known as: Levaquin      Take 1 tablet (750 mg) by mouth once daily for 2 days.       multivitamin with minerals tablet  Start taking on: April 24, 2024      Take 1 tablet by mouth once daily.              CONTINUE taking these medications        Instructions Last Dose Given Next Dose Due   acetaminophen 325 mg tablet  Commonly known as: Tylenol      Take 2 tablets (650 mg) by mouth every 4 hours if needed for moderate pain (4 - 6).       albuterol 2.5 mg /3 mL (0.083 %) nebulizer solution      Take 3 mL (2.5 mg) by nebulization every 6 hours if needed for wheezing.       atorvastatin 80 mg tablet  Commonly known as: Lipitor           baclofen 10 mg tablet  Commonly known as: Lioresal           carBAMazepine 200 mg tablet  Commonly known as: TEGretol           gabapentin 600 mg tablet  Commonly known as: Neurontin           HYDROcodone-acetaminophen 5-325 mg tablet  Commonly known as: Norco           lactobacillus acidophilus capsule           methIMAzole 5 mg tablet  Commonly known as: Tapazole           metoprolol succinate XL 25 mg 24 hr tablet  Commonly known as: Toprol-XL           STRESS B PLUS ZINC ORAL                  STOP taking these medications      cephalexin 500 mg capsule  Commonly known as: Keflex                  Where to Get Your Medications        Information about where to get these medications is not yet available    Ask your nurse or doctor about these medications  levoFLOXacin 750 mg tablet  multivitamin with minerals tablet         Test Results Pending At Discharge  Pending Labs       Order Current Status    Sars-CoV-2 PCR In process    Blood Culture Preliminary result    Blood  Culture Preliminary result    Blood Culture Preliminary result    Blood Culture Preliminary result            Hospital Course   Raheem Hilton is a 64 y.o. male with PMHx of MS functional paraplegic, neurogenic bladder with recurrent UTI, TIA, COPD, Chronic buttock wound, chronic hyponatremia and HTN  presents from local skilled nursing facility with concern for altered mentation. Patient is prone to urinary tract infection given his suprapubic catheter. He is admitted to the inpatient unit for continued treatment. He has what appears to be a recurrent urinary tract infection with an elevated white count.  Did not have other signs of sepsis.    Mentation improved during admission. His suprapubic catheter was changed on 4/20/24. Urine Cultures were positive for Mirabilis susceptible to meropenem. He received that 4 days. His WBC trended down, electrolytes remained stable. However, patient continued to spike temps at HS. Blood cultures later revealed Pseudomonas aeruginosa. IV antibiotics were switched to organism susceptibility.   Pt will be discharged back to [facility] in stable condition on Levaquin until 4/25/24. He will also have a 2 day written prescription for Norco.     Pertinent Physical Exam At Time of Discharge  Physical Exam  Constitutional:       General: He is not in acute distress.     Appearance: Normal appearance. He is normal weight. He is not ill-appearing, toxic-appearing or diaphoretic.   HENT:      Head: Normocephalic.   Cardiovascular:      Rate and Rhythm: Normal rate and regular rhythm.      Pulses: Normal pulses.      Heart sounds: Normal heart sounds. No murmur heard.  Pulmonary:      Effort: Pulmonary effort is normal. No respiratory distress.      Breath sounds: No wheezing or rhonchi.   Abdominal:      General: Abdomen is flat. Bowel sounds are normal.      Palpations: Abdomen is soft. There is no mass.      Tenderness: There is no abdominal tenderness. Suprapubic cath intact no drainage  or redness around insertion site.   Musculoskeletal:      Cervical back: Normal range of motion and neck supple. No rigidity or tenderness. He does have contractures of lower extremities.   Lymphadenopathy:      Cervical: No cervical adenopathy.   Skin:     General: Skin is warm and dry.   Neurological:      General: No focal deficit present.      Mental Status: He is alert and oriented to person, place, and time. Mental status is at baseline.      Cranial Nerves: No cranial nerve deficit.   Psychiatric:         Mood and Affect: Mood normal.         Thought Content: Thought content normal.         Judgment: Judgment normal.   Outpatient Follow-Up  No future appointments.  Pt to have a transition of care visit with PCP within one week of return to [facility].     Paula Diaz, APRN-CNP   98

## 2024-04-23 NOTE — DISCHARGE INSTRUCTIONS
Suprapubic catheter care twice a day.    Wound team consult.    Wound care soap and water, Mepilex to wound bed, cover with foam each shift.

## 2024-04-23 NOTE — CARE PLAN
Problem: Psychosocial Needs  Goal: Demonstrates ability to cope with hospitalization/illness  Outcome: Progressing  Goal: Collaborate with me, my family, and caregiver to identify my specific goals  Outcome: Progressing     Problem: Discharge Barriers  Goal: My discharge needs are met  Outcome: Progressing     Problem: Skin  Goal: Decreased wound size/increased tissue granulation at next dressing change  Outcome: Progressing  Flowsheets (Taken 4/23/2024 0745)  Decreased wound size/increased tissue granulation at next dressing change: Promote sleep for wound healing  Goal: Participates in plan/prevention/treatment measures  Outcome: Progressing  Flowsheets (Taken 4/23/2024 0745)  Participates in plan/prevention/treatment measures: Discuss with provider PT/OT consult  Goal: Prevent/manage excess moisture  Outcome: Progressing  Flowsheets (Taken 4/23/2024 0745)  Prevent/manage excess moisture: Moisturize dry skin  Goal: Prevent/minimize sheer/friction injuries  Outcome: Progressing  Flowsheets (Taken 4/23/2024 0745)  Prevent/minimize sheer/friction injuries: Use pull sheet  Goal: Promote/optimize nutrition  Outcome: Progressing  Flowsheets (Taken 4/23/2024 0745)  Promote/optimize nutrition: Consume > 50% meals/supplements  Goal: Promote skin healing  Outcome: Progressing  Flowsheets (Taken 4/23/2024 0745)  Promote skin healing: Turn/reposition every 2 hours/use positioning/transfer devices     Problem: Nutrition  Goal: Oral intake greater 75%  Outcome: Progressing  Goal: Consume prescribed supplement  Outcome: Progressing  Goal: Adequate PO fluid intake  Outcome: Progressing  Goal: Lab values WNL  Outcome: Progressing  Goal: Electrolytes WNL  Outcome: Progressing  Goal: Promote healing  Outcome: Progressing  Goal: Maintain stable weight  Outcome: Progressing   The patient's goals for the shift include use the call light    The clinical goals for the shift include no falls    Over the shift, the patient did not make  progress toward the following goals. Barriers to progression include . Recommendations to address these barriers include .

## 2024-04-24 LAB
BACTERIA BLD AEROBE CULT: ABNORMAL
BACTERIA BLD CULT: ABNORMAL
GRAM STN SPEC: ABNORMAL

## 2024-04-25 LAB
BACTERIA BLD CULT: NORMAL
BACTERIA BLD CULT: NORMAL

## 2024-04-27 ENCOUNTER — HOSPITAL ENCOUNTER (EMERGENCY)
Facility: HOSPITAL | Age: 64
Discharge: HOME | End: 2024-04-27
Attending: EMERGENCY MEDICINE
Payer: MEDICARE

## 2024-04-27 VITALS
OXYGEN SATURATION: 95 % | HEIGHT: 73 IN | WEIGHT: 170 LBS | BODY MASS INDEX: 22.53 KG/M2 | DIASTOLIC BLOOD PRESSURE: 79 MMHG | TEMPERATURE: 98.4 F | RESPIRATION RATE: 16 BRPM | SYSTOLIC BLOOD PRESSURE: 130 MMHG | HEART RATE: 105 BPM

## 2024-04-27 DIAGNOSIS — R31.9 HEMATURIA, UNSPECIFIED TYPE: Primary | ICD-10-CM

## 2024-04-27 LAB
ALBUMIN SERPL BCP-MCNC: 3.4 G/DL (ref 3.4–5)
ALP SERPL-CCNC: 104 U/L (ref 33–136)
ALT SERPL W P-5'-P-CCNC: 43 U/L (ref 10–52)
ANION GAP SERPL CALC-SCNC: 11 MMOL/L (ref 10–20)
APPEARANCE UR: ABNORMAL
AST SERPL W P-5'-P-CCNC: 25 U/L (ref 9–39)
BACTERIA #/AREA URNS AUTO: ABNORMAL /HPF
BASOPHILS # BLD AUTO: 0.09 X10*3/UL (ref 0–0.1)
BASOPHILS NFR BLD AUTO: 0.5 %
BILIRUB SERPL-MCNC: 0.4 MG/DL (ref 0–1.2)
BILIRUB UR STRIP.AUTO-MCNC: NEGATIVE MG/DL
BUN SERPL-MCNC: 15 MG/DL (ref 6–23)
CALCIUM SERPL-MCNC: 9 MG/DL (ref 8.6–10.3)
CHLORIDE SERPL-SCNC: 99 MMOL/L (ref 98–107)
CO2 SERPL-SCNC: 28 MMOL/L (ref 21–32)
COLOR UR: ABNORMAL
CREAT SERPL-MCNC: 0.68 MG/DL (ref 0.5–1.3)
EGFRCR SERPLBLD CKD-EPI 2021: >90 ML/MIN/1.73M*2
EOSINOPHIL # BLD AUTO: 0.44 X10*3/UL (ref 0–0.7)
EOSINOPHIL NFR BLD AUTO: 2.5 %
ERYTHROCYTE [DISTWIDTH] IN BLOOD BY AUTOMATED COUNT: 14.3 % (ref 11.5–14.5)
GLUCOSE SERPL-MCNC: 91 MG/DL (ref 74–99)
GLUCOSE UR STRIP.AUTO-MCNC: ABNORMAL MG/DL
HCT VFR BLD AUTO: 41.2 % (ref 41–52)
HGB BLD-MCNC: 13.5 G/DL (ref 13.5–17.5)
IMM GRANULOCYTES # BLD AUTO: 0.11 X10*3/UL (ref 0–0.7)
IMM GRANULOCYTES NFR BLD AUTO: 0.6 % (ref 0–0.9)
KETONES UR STRIP.AUTO-MCNC: NEGATIVE MG/DL
LEUKOCYTE ESTERASE UR QL STRIP.AUTO: ABNORMAL
LYMPHOCYTES # BLD AUTO: 1.3 X10*3/UL (ref 1.2–4.8)
LYMPHOCYTES NFR BLD AUTO: 7.5 %
MCH RBC QN AUTO: 30.7 PG (ref 26–34)
MCHC RBC AUTO-ENTMCNC: 32.8 G/DL (ref 32–36)
MCV RBC AUTO: 94 FL (ref 80–100)
MONOCYTES # BLD AUTO: 1.6 X10*3/UL (ref 0.1–1)
MONOCYTES NFR BLD AUTO: 9.3 %
NEUTROPHILS # BLD AUTO: 13.73 X10*3/UL (ref 1.2–7.7)
NEUTROPHILS NFR BLD AUTO: 79.6 %
NITRITE UR QL STRIP.AUTO: NEGATIVE
NRBC BLD-RTO: 0 /100 WBCS (ref 0–0)
PH UR STRIP.AUTO: 7.5 [PH]
PLATELET # BLD AUTO: 568 X10*3/UL (ref 150–450)
POTASSIUM SERPL-SCNC: 3.9 MMOL/L (ref 3.5–5.3)
PROT SERPL-MCNC: 7.1 G/DL (ref 6.4–8.2)
PROT UR STRIP.AUTO-MCNC: ABNORMAL MG/DL
RBC # BLD AUTO: 4.4 X10*6/UL (ref 4.5–5.9)
RBC # UR STRIP.AUTO: ABNORMAL /UL
RBC #/AREA URNS AUTO: >20 /HPF
SODIUM SERPL-SCNC: 134 MMOL/L (ref 136–145)
SP GR UR STRIP.AUTO: 1.02
UROBILINOGEN UR STRIP.AUTO-MCNC: 0.2 MG/DL
WBC # BLD AUTO: 17.3 X10*3/UL (ref 4.4–11.3)
WBC #/AREA URNS AUTO: ABNORMAL /HPF

## 2024-04-27 PROCEDURE — 87086 URINE CULTURE/COLONY COUNT: CPT | Mod: SAMLAB | Performed by: EMERGENCY MEDICINE

## 2024-04-27 PROCEDURE — 99283 EMERGENCY DEPT VISIT LOW MDM: CPT | Mod: 25

## 2024-04-27 PROCEDURE — 36415 COLL VENOUS BLD VENIPUNCTURE: CPT | Performed by: EMERGENCY MEDICINE

## 2024-04-27 PROCEDURE — 99284 EMERGENCY DEPT VISIT MOD MDM: CPT | Mod: 25

## 2024-04-27 PROCEDURE — 81001 URINALYSIS AUTO W/SCOPE: CPT | Performed by: EMERGENCY MEDICINE

## 2024-04-27 PROCEDURE — 85025 COMPLETE CBC W/AUTO DIFF WBC: CPT | Performed by: EMERGENCY MEDICINE

## 2024-04-27 PROCEDURE — 96360 HYDRATION IV INFUSION INIT: CPT

## 2024-04-27 PROCEDURE — 80053 COMPREHEN METABOLIC PANEL: CPT | Performed by: EMERGENCY MEDICINE

## 2024-04-27 PROCEDURE — 2500000004 HC RX 250 GENERAL PHARMACY W/ HCPCS (ALT 636 FOR OP/ED): Performed by: EMERGENCY MEDICINE

## 2024-04-27 RX ORDER — BACTERIOSTATIC WATER 1 ML/ML
INJECTION, SOLUTION INTRAMUSCULAR; INTRAVENOUS; SUBCUTANEOUS
Status: DISCONTINUED
Start: 2024-04-27 | End: 2024-04-28 | Stop reason: HOSPADM

## 2024-04-27 RX ADMIN — SODIUM CHLORIDE 1000 ML: 9 INJECTION, SOLUTION INTRAVENOUS at 18:49

## 2024-04-27 ASSESSMENT — PAIN DESCRIPTION - LOCATION: LOCATION: ABDOMEN

## 2024-04-27 ASSESSMENT — PAIN - FUNCTIONAL ASSESSMENT: PAIN_FUNCTIONAL_ASSESSMENT: 0-10

## 2024-04-27 ASSESSMENT — PAIN SCALES - GENERAL: PAINLEVEL_OUTOF10: 5 - MODERATE PAIN

## 2024-04-27 NOTE — ED PROVIDER NOTES
HPI   No chief complaint on file.      Limitations to History: None    HPI: 64-year-old male presents with hematuria.  Has suprapubic catheter which was replaced this morning.  Began bleeding approximately 3 hours ago.  Was decreased in drainage.  Patient has some mild lower abdominal pain.  Denies any fever, chills, nausea, vomiting.  Does admit to some lightheadedness.  Denies any chest pain or shortness of breath.    Additional History Obtained from: EMS    ------------------------------------------------------------------------------------------------------------------------------------------  Physical Exam:    VS: As documented in the triage note and EMR flowsheet from this visit were reviewed.    Appearance: Alert. cooperative,  in no acute distress.   Skin: Intact,  dry skin, no lesions, rash, petechiae or purpura.   Eyes: PERRLA, EOMs intact,  Conjunctiva pink with no redness or exudates.   HENT: Normocephalic, atraumatic. Nares patent. No intraoral lesions.   Neck: Supple, without meningismus. Trachea at midline. No lymphadenopathy.  Pulmonary: Clear bilaterally with good chest wall excursion. No rales, rhonchi or wheezing. No accessory muscle use or stridor.  Cardiac: Regular rate and rhythm, no rubs, murmurs, or gallops.   Abdomen: Abdomen is soft, nontender, and nondistended.  No palpable organomegaly.  No rebound or guarding.  No CVA tenderness. Nonsurgical abdomen.  Genitourinary: Suprapubic catheter in place.  No surrounding cellulitis.  Musculoskeletal: Full range of motion.  Pulses full and equal. No cyanosis, clubbing, or edema.  Neurological:  Cranial nerves are grossly intact, grossly normal sensation, no weakness, no focal findings identified.  Psychiatric: Appropriate mood and affect.                            [place] Coma Scale Score: 15                     Patient History   Past Medical History:   Diagnosis Date    Anemia     Bowel perforation (Multi)     Buttock wound, unspecified  laterality, subsequent encounter     COPD (chronic obstructive pulmonary disease) (Multi)     Extended spectrum beta lactamase (ESBL) resistance     GERD (gastroesophageal reflux disease)     Hyperlipidemia     Hypertension     Hypo-osmolality and hyponatremia     Insomnia     Multiple sclerosis (Multi)     Neurogenic bladder     Neuromuscular dysfunction of bladder     Personal history of transient ischemic attack (TIA), and cerebral infarction without residual deficits 08/06/2020    History of embolic stroke    Pulmonary embolism (Multi)     Sepsis with encephalopathy (Multi)     Septic shock (Multi)     Simple febrile convulsions (Multi)     Status post administration of tPA (rtPA) in a different facility within the last 24 hours prior to admission to current facility 08/06/2020    Tissue plasminogen activator (tPA) administered at other facility within 24 hours before current admission    UTI (urinary tract infection)      Past Surgical History:   Procedure Laterality Date    MR HEAD ANGIO WO IV CONTRAST  6/29/2020    MR HEAD ANGIO WO IV CONTRAST 6/29/2020 [facility] CLINICAL LEGACY    MR NECK ANGIO WO IV CONTRAST  6/29/2020    MR NECK ANGIO WO IV CONTRAST 6/29/2020 [facility] CLINICAL LEGACY     No family history on file.  Social History     Tobacco Use    Smoking status: Never    Smokeless tobacco: Never   Substance Use Topics    Alcohol use: Not on file    Drug use: Not on file       Physical Exam   ED Triage Vitals [04/27/24 1314]   Temperature Heart Rate Respirations BP   36.9 °C (98.4 °F) 95 18 (!) 149/93      Pulse Ox Temp Source Heart Rate Source Patient Position   95 % Tympanic Monitor --      BP Location FiO2 (%)     -- --       Physical Exam    ED Course & MDM   Diagnoses as of 04/27/24 6129   Hematuria, unspecified type       Medical Decision Making  Labs Reviewed  CBC WITH AUTO DIFFERENTIAL - Abnormal     WBC                           17.3 (*)               nRBC                          0.0                     RBC                           4.40 (*)               Hemoglobin                    13.5                   Hematocrit                    41.2                   MCV                           94                     MCH                           30.7                   MCHC                          32.8                   RDW                           14.3                   Platelets                     568 (*)                Neutrophils %                 79.6                   Immature Granulocytes %, Automated   0.6                    Lymphocytes %                 7.5                    Monocytes %                   9.3                    Eosinophils %                 2.5                    Basophils %                   0.5                    Neutrophils Absolute          13.73 (*)               Immature Granulocytes Absolute, Au*   0.11                   Lymphocytes Absolute          1.30                   Monocytes Absolute            1.60 (*)               Eosinophils Absolute          0.44                   Basophils Absolute            0.09                COMPREHENSIVE METABOLIC PANEL - Abnormal     Glucose                       91                     Sodium                        134 (*)                Potassium                     3.9                    Chloride                      99                     Bicarbonate                   28                     Anion Gap                     11                     Urea Nitrogen                 15                     Creatinine                    0.68                   eGFR                          >90                    Calcium                       9.0                    Albumin                       3.4                    Alkaline Phosphatase          104                    Total Protein                 7.1                    AST                           25                     Bilirubin, Total              0.4                    ALT                           43                   URINALYSIS WITH REFLEX CULTURE AND MICROSCOPIC - Abnormal     Color, Urine                  Red (*)                Appearance, Urine             Cloudy (*)               Specific Gravity, Urine       1.025                  pH, Urine                     7.5                    Protein, Urine                >=300 (3+) (*)               Glucose, Urine                100 (1+) (*)               Blood, Urine                  LARGE (3+) (*)               Ketones, Urine                NEGATIVE                Bilirubin, Urine              NEGATIVE                Urobilinogen, Urine           0.2                    Nitrite, Urine                NEGATIVE                Leukocyte Esterase, Urine     LARGE (3+) (*)                   Narrative: Glucose Clinitek result is Trace  MICROSCOPIC ONLY, URINE - Abnormal     WBC, Urine                    1-5                    RBC, Urine                    >20 (*)                Bacteria, Urine               1+ (*)              URINE CULTURE  URINALYSIS WITH REFLEX CULTURE AND MICROSCOPIC         Narrative: The following orders were created for panel order Urinalysis with Reflex Culture and Microscopic.                  Procedure                               Abnormality         Status                                     ---------                               -----------         ------                                     Urinalysis with Reflex C...[[id number]]  Abnormal            Final result                               Extra Urine Gray Tube[[id number]]                            In process                                                   Please view results for these tests on the individual orders.  EXTRA URINE GRAY TUBE  Medical Decision Making:    Patient appears well nontoxic.  Vital signs within normal limits.  White blood cell count of 17,000.  Afebrile.  Urine contaminated.  Flushed multiple times and continued to clot.  Case discussed with urologist on-call Dr. Nielson  who advised placing a urethral catheter for irrigation.  Unsuccessful to place catheter however there was a large clot removed from the urethra.  Following patient's urine began to drain clear.  Has continued to drain clear.  Will be discharged back to nursing facility.  Stable at time of discharge.    Differential Diagnoses Considered: UTI, traumatic [place] insertion, bladder hematoma    Escalation of Care:  Appropriate for discharge and follow-up with primary care.          Procedure  Procedures     Po Crump DO  04/27/24 7856

## 2024-04-28 LAB — HOLD SPECIMEN: NORMAL

## 2024-04-28 NOTE — ED PROVIDER NOTES
Medical Decision Making      Emergency Medicine Transition of Care Note.    I received Raheem Hilton in signout from Dr. Bhakta.  Please see the previous ED provider note for all HPI, PE and MDM up to the time of signout at 1900. This is in addition to the primary record.    In brief Raheem Hilton is an 64 y.o. male presenting for   Chief Complaint   Patient presents with   • Blood in Urine     Patient has a chronic cath. Noted this morning at nursing home to have blood in it and not draining. New cath placed this morning at nursing home. Blood present. Pt c/o some abd pain and light headedness     At the time of signout we were awaiting: None awaiting transportation via ambulance back to where the patient resides.          Final diagnoses:   [R31.9] Hematuria, unspecified type           Procedure  Procedures    Pillo Conner, DO Pillo Conner DO  04/28/24 0221

## 2024-04-29 LAB — BACTERIA UR CULT: NORMAL

## 2024-05-07 ASSESSMENT — ENCOUNTER SYMPTOMS
COUGH: 0
ALLERGIC/IMMUNOLOGIC NEGATIVE: 1
DIFFICULTY URINATING: 0
NAUSEA: 0
PSYCHIATRIC NEGATIVE: 1
SHORTNESS OF BREATH: 0
FEVER: 0
CHILLS: 0
EYES NEGATIVE: 1
ENDOCRINE NEGATIVE: 1

## 2024-05-07 NOTE — PROGRESS NOTES
Subjective   Patient ID: Raheem Hilton is a 64 y.o. male.    HPI  Patient is here to re establish for SP issues. This was placed at [facility] many years ago.He was seen in ER on 4/27 for hematuria. S/P was changed at NH on 4/27. No medication for LUTS.       Review of Systems   Constitutional:  Negative for chills and fever.   HENT: Negative.     Eyes: Negative.    Respiratory:  Negative for cough and shortness of breath.    Cardiovascular:  Negative for chest pain and leg swelling.   Gastrointestinal:  Negative for nausea.   Endocrine: Negative.    Genitourinary:  Negative for difficulty urinating.        Negative except for documented in HPI   Allergic/Immunologic: Negative.    Neurological:         Alert & oriented X 3   Hematological:         Denies blood thinners   Psychiatric/Behavioral: Negative.         Objective   Physical Exam  Vitals and nursing note reviewed.   Constitutional:       General: He is not in acute distress.     Appearance: Normal appearance.   Pulmonary:      Effort: Pulmonary effort is normal.   Abdominal:      Tenderness: There is no abdominal tenderness.   Genitourinary:     Comments: Kidneys non palpable bilaterally  Bladder non palpable or tender  Scrotum no mass, No hydrocele  Epididymis- No spermatocele. Non Tender.  Testicles: No mass. WNL  Urethra: No discharge chronic changes of erosion  Penis within normal limits... No lesions  Prostate - Unable to position  SP site clean  Urine clear  In wheelchair  Neurological:      Mental Status: He is alert.       Assessment/Plan   Diagnoses and all orders for this visit:  Urinary catheter complication, subsequent encounter  Retention of urine      All available PSA values reviewed, Options discussed. Questions answered.  NEW PSA ORDERED   Diet changes for prostate health discussed and educational information given. Pros/Cons of prostate health supplements discussed.   Treatment options for LUTS reviewed  Discussed timed voiding. Discussed fluid  "and caffeine intake  Treatment options for ED reviewed.  Lifestyle change to help prevent UTIs discussed. Encouraged fluid intake.  Multiple past Cx reviewed  Patient likely has chronic colonization but is not \"ill\" with these  CT reviewed-Normal  Cysto scheduled  ER notes reviewed  BMP reviewed    F/U  cysto with PSA    "

## 2024-05-08 ENCOUNTER — OFFICE VISIT (OUTPATIENT)
Dept: UROLOGY | Facility: CLINIC | Age: 64
End: 2024-05-08
Payer: MEDICARE

## 2024-05-08 VITALS — RESPIRATION RATE: 16 BRPM | WEIGHT: 170 LBS | BODY MASS INDEX: 22.43 KG/M2

## 2024-05-08 DIAGNOSIS — T83.9XXD URINARY CATHETER COMPLICATION, SUBSEQUENT ENCOUNTER: ICD-10-CM

## 2024-05-08 DIAGNOSIS — R33.9 RETENTION OF URINE: ICD-10-CM

## 2024-05-08 PROCEDURE — 99214 OFFICE O/P EST MOD 30 MIN: CPT | Performed by: UROLOGY

## 2024-05-08 PROCEDURE — 1111F DSCHRG MED/CURRENT MED MERGE: CPT | Performed by: UROLOGY

## 2024-05-08 PROCEDURE — 1159F MED LIST DOCD IN RCRD: CPT | Performed by: UROLOGY

## 2024-05-08 PROCEDURE — 1157F ADVNC CARE PLAN IN RCRD: CPT | Performed by: UROLOGY

## 2024-05-29 NOTE — PROGRESS NOTES
Patient ID: Raheem Hilton is a 64 y.o. male.    Procedures  The patient was prepped using a Betadine solution. Lidocaine jelly was instilled into the urethra. The flexible cystoscope was sterilely inserted into the urethra and formal cystoscopy performed in a systematic fashion. . For detailed findings of the procedure, please see Dr. Nielson remarks below  CIPRO 250MG POBID TIMES 3 DAYS GIVEN                          Suprapubic tube change  Area was prepped with betadine. The old suprapubic tube was deflated and removed. Lidocaine jelly used to lubricate the tract. New [place] inserted through the suprapubic tract into the bladder. Catheter irrigated with betadine solution to verify proper placement. [place] balloon then inflated with 10 cc sterile water. New bag placed to drain

## 2024-06-05 ENCOUNTER — APPOINTMENT (OUTPATIENT)
Dept: UROLOGY | Facility: CLINIC | Age: 64
End: 2024-06-05
Payer: MEDICARE

## 2024-07-01 NOTE — PROGRESS NOTES
Patient ID: Raheem Hilton is a 64 y.o. male.    Procedures  The patient was prepped using a Betadine solution. Lidocaine jelly was instilled into the urethra. The flexible cystoscope was sterilely inserted into the urethra and formal cystoscopy performed in a systematic fashion. . For detailed findings of the procedure, please see Dr. Nielson remarks below  CIPRO 250MG POBID TIMES 3 DAYS GIVEN     PSA 13.42 (5/9/2024)    NO MASS. NO STONE. SMALL CAPACITY BLADDER    PROSTATE MRI ORDERED    Suprapubic tube change  Area was prepped with betadine. The old suprapubic tube was deflated and removed. Lidocaine jelly used to lubricate the tract. New [place] inserted through the suprapubic tract into the bladder. Catheter irrigated with betadine solution to verify proper placement. [place] balloon then inflated with 10 cc sterile water. New bag placed to drain  SIZE 16FR [place] PLACED  S/P IS CHANGED AT [facility].

## 2024-07-10 ENCOUNTER — APPOINTMENT (OUTPATIENT)
Dept: UROLOGY | Facility: CLINIC | Age: 64
End: 2024-07-10
Payer: MEDICARE

## 2024-07-10 VITALS — SYSTOLIC BLOOD PRESSURE: 142 MMHG | RESPIRATION RATE: 16 BRPM | DIASTOLIC BLOOD PRESSURE: 74 MMHG

## 2024-07-10 DIAGNOSIS — R97.20 ELEVATED PSA: Primary | ICD-10-CM

## 2024-07-10 DIAGNOSIS — R33.9 URINARY RETENTION: ICD-10-CM

## 2024-07-10 PROCEDURE — 52000 CYSTOURETHROSCOPY: CPT | Performed by: UROLOGY

## 2024-07-12 ENCOUNTER — HOSPITAL ENCOUNTER (OUTPATIENT)
Dept: RADIOLOGY | Facility: HOSPITAL | Age: 64
Discharge: HOME | End: 2024-07-12
Payer: MEDICARE

## 2024-07-12 DIAGNOSIS — R97.20 ELEVATED PSA: ICD-10-CM

## 2024-07-12 PROCEDURE — A9575 INJ GADOTERATE MEGLUMI 0.1ML: HCPCS | Performed by: UROLOGY

## 2024-07-12 PROCEDURE — 72197 MRI PELVIS W/O & W/DYE: CPT

## 2024-07-12 PROCEDURE — 2550000001 HC RX 255 CONTRASTS: Performed by: UROLOGY

## 2024-07-12 RX ORDER — GADOTERATE MEGLUMINE 376.9 MG/ML
16 INJECTION INTRAVENOUS
Status: COMPLETED | OUTPATIENT
Start: 2024-07-12 | End: 2024-07-12

## 2024-07-30 ASSESSMENT — ENCOUNTER SYMPTOMS
ENDOCRINE NEGATIVE: 1
CHILLS: 0
DIFFICULTY URINATING: 0
FEVER: 0
SHORTNESS OF BREATH: 0
EYES NEGATIVE: 1
COUGH: 0
ALLERGIC/IMMUNOLOGIC NEGATIVE: 1
PSYCHIATRIC NEGATIVE: 1
NAUSEA: 0

## 2024-07-30 NOTE — PROGRESS NOTES
Subjective   Patient ID: Raheem Hilton is a 64 y.o. male.    HPI  Patient is here for prostate MRI results. MRI showed 2.8cm PI-RAD 5 lesion. Most recent PSA was 13.42 on 5/24. Hx of retention. He had SP. This is changed at [place].       Review of Systems   Constitutional:  Negative for chills and fever.   HENT: Negative.     Eyes: Negative.    Respiratory:  Negative for cough and shortness of breath.    Cardiovascular:  Negative for chest pain and leg swelling.   Gastrointestinal:  Negative for nausea.   Endocrine: Negative.    Genitourinary:  Negative for difficulty urinating.        Negative except for documented in HPI   Allergic/Immunologic: Negative.    Neurological:         Alert & oriented X 3   Hematological:         Denies blood thinners   Psychiatric/Behavioral: Negative.         Objective   Physical Exam  Vitals and nursing note reviewed.   Pulmonary:      Effort: Pulmonary effort is normal.   Abdominal:      Palpations: Abdomen is soft.      Tenderness: There is no abdominal tenderness.   Genitourinary:     Comments: Kidneys non palpable bilaterally  Bladder non palpable or tender  In chair  Neurological:      Mental Status: He is alert.         Assessment/Plan   Diagnoses and all orders for this visit:  Neurogenic bladder  Elevated PSA  Urinary retention      All available PSA values reviewed, Options discussed. Questions answered.  MRI reviewed  Pros and cons of prostate biopsy reviewed. Other options discussed. Questions answered  Cipro Rx given   Diet changes for prostate health discussed and educational information given. Pros/Cons of prostate health supplements discussed.   Treatment options for LUTS reviewed  Continue SP-NH to care for  Lifestyle change to help prevent UTIs discussed. Encouraged fluid intake.-No current SX    F/U MRI FUSION BX

## 2024-07-30 NOTE — H&P (VIEW-ONLY)
Subjective   Patient ID: Raheem Hilton is a 64 y.o. male.    HPI  Patient is here for prostate MRI results. MRI showed 2.8cm PI-RAD 5 lesion. Most recent PSA was 13.42 on 5/24. Hx of retention. He had SP. This is changed at [place].       Review of Systems   Constitutional:  Negative for chills and fever.   HENT: Negative.     Eyes: Negative.    Respiratory:  Negative for cough and shortness of breath.    Cardiovascular:  Negative for chest pain and leg swelling.   Gastrointestinal:  Negative for nausea.   Endocrine: Negative.    Genitourinary:  Negative for difficulty urinating.        Negative except for documented in HPI   Allergic/Immunologic: Negative.    Neurological:         Alert & oriented X 3   Hematological:         Denies blood thinners   Psychiatric/Behavioral: Negative.         Objective   Physical Exam  Vitals and nursing note reviewed.   Pulmonary:      Effort: Pulmonary effort is normal.   Abdominal:      Palpations: Abdomen is soft.      Tenderness: There is no abdominal tenderness.   Genitourinary:     Comments: Kidneys non palpable bilaterally  Bladder non palpable or tender  In chair  Neurological:      Mental Status: He is alert.         Assessment/Plan   Diagnoses and all orders for this visit:  Neurogenic bladder  Elevated PSA  Urinary retention      All available PSA values reviewed, Options discussed. Questions answered.  MRI reviewed  Pros and cons of prostate biopsy reviewed. Other options discussed. Questions answered  Cipro Rx given   Diet changes for prostate health discussed and educational information given. Pros/Cons of prostate health supplements discussed.   Treatment options for LUTS reviewed  Continue SP-NH to care for  Lifestyle change to help prevent UTIs discussed. Encouraged fluid intake.-No current SX    F/U MRI FUSION BX

## 2024-07-31 ENCOUNTER — APPOINTMENT (OUTPATIENT)
Dept: UROLOGY | Facility: CLINIC | Age: 64
End: 2024-07-31
Payer: MEDICARE

## 2024-07-31 VITALS
DIASTOLIC BLOOD PRESSURE: 89 MMHG | BODY MASS INDEX: 22.69 KG/M2 | HEART RATE: 73 BPM | SYSTOLIC BLOOD PRESSURE: 168 MMHG | WEIGHT: 172 LBS

## 2024-07-31 DIAGNOSIS — N31.9 NEUROGENIC BLADDER: ICD-10-CM

## 2024-07-31 DIAGNOSIS — R33.9 URINARY RETENTION: ICD-10-CM

## 2024-07-31 DIAGNOSIS — R97.20 ELEVATED PSA: ICD-10-CM

## 2024-07-31 PROCEDURE — 1036F TOBACCO NON-USER: CPT | Performed by: UROLOGY

## 2024-07-31 PROCEDURE — 99214 OFFICE O/P EST MOD 30 MIN: CPT | Performed by: UROLOGY

## 2024-07-31 PROCEDURE — 1157F ADVNC CARE PLAN IN RCRD: CPT | Performed by: UROLOGY

## 2024-07-31 PROCEDURE — 1159F MED LIST DOCD IN RCRD: CPT | Performed by: UROLOGY

## 2024-08-02 ENCOUNTER — PREP FOR PROCEDURE (OUTPATIENT)
Dept: UROLOGY | Facility: CLINIC | Age: 64
End: 2024-08-02
Payer: MEDICARE

## 2024-08-02 DIAGNOSIS — R97.20 ELEVATED PSA: ICD-10-CM

## 2024-08-12 ENCOUNTER — HOSPITAL ENCOUNTER (EMERGENCY)
Facility: HOSPITAL | Age: 64
Discharge: HOME | End: 2024-08-12
Attending: EMERGENCY MEDICINE
Payer: MEDICARE

## 2024-08-12 ENCOUNTER — PHARMACY VISIT (OUTPATIENT)
Dept: PHARMACY | Facility: CLINIC | Age: 64
End: 2024-08-12
Payer: COMMERCIAL

## 2024-08-12 ENCOUNTER — APPOINTMENT (OUTPATIENT)
Dept: RADIOLOGY | Facility: HOSPITAL | Age: 64
End: 2024-08-12
Payer: MEDICARE

## 2024-08-12 VITALS
SYSTOLIC BLOOD PRESSURE: 141 MMHG | TEMPERATURE: 97.7 F | DIASTOLIC BLOOD PRESSURE: 95 MMHG | HEART RATE: 85 BPM | OXYGEN SATURATION: 93 % | RESPIRATION RATE: 18 BRPM | WEIGHT: 172.6 LBS | BODY MASS INDEX: 22.77 KG/M2

## 2024-08-12 DIAGNOSIS — N39.0 UTI (URINARY TRACT INFECTION), BACTERIAL: Primary | ICD-10-CM

## 2024-08-12 DIAGNOSIS — A49.9 UTI (URINARY TRACT INFECTION), BACTERIAL: Primary | ICD-10-CM

## 2024-08-12 DIAGNOSIS — N30.90 CYSTITIS: ICD-10-CM

## 2024-08-12 DIAGNOSIS — N20.0 RENAL CALCULI: ICD-10-CM

## 2024-08-12 LAB
ALBUMIN SERPL BCP-MCNC: 3.7 G/DL (ref 3.4–5)
ALP SERPL-CCNC: 115 U/L (ref 33–136)
ALT SERPL W P-5'-P-CCNC: 31 U/L (ref 10–52)
ANION GAP SERPL CALC-SCNC: 10 MMOL/L (ref 10–20)
APPEARANCE UR: CLEAR
AST SERPL W P-5'-P-CCNC: 23 U/L (ref 9–39)
BACTERIA #/AREA URNS AUTO: ABNORMAL /HPF
BASOPHILS # BLD AUTO: 0.08 X10*3/UL (ref 0–0.1)
BASOPHILS NFR BLD AUTO: 0.8 %
BILIRUB SERPL-MCNC: 0.3 MG/DL (ref 0–1.2)
BILIRUB UR STRIP.AUTO-MCNC: NEGATIVE MG/DL
BUN SERPL-MCNC: 13 MG/DL (ref 6–23)
CALCIUM SERPL-MCNC: 8.8 MG/DL (ref 8.6–10.3)
CHLORIDE SERPL-SCNC: 99 MMOL/L (ref 98–107)
CO2 SERPL-SCNC: 26 MMOL/L (ref 21–32)
COLOR UR: COLORLESS
CREAT SERPL-MCNC: 0.41 MG/DL (ref 0.5–1.3)
EGFRCR SERPLBLD CKD-EPI 2021: >90 ML/MIN/1.73M*2
EOSINOPHIL # BLD AUTO: 0.83 X10*3/UL (ref 0–0.7)
EOSINOPHIL NFR BLD AUTO: 8.6 %
ERYTHROCYTE [DISTWIDTH] IN BLOOD BY AUTOMATED COUNT: 13.3 % (ref 11.5–14.5)
GLUCOSE SERPL-MCNC: 94 MG/DL (ref 74–99)
GLUCOSE UR STRIP.AUTO-MCNC: NORMAL MG/DL
HCT VFR BLD AUTO: 39.5 % (ref 41–52)
HGB BLD-MCNC: 13.1 G/DL (ref 13.5–17.5)
HOLD SPECIMEN: NORMAL
IMM GRANULOCYTES # BLD AUTO: 0.03 X10*3/UL (ref 0–0.7)
IMM GRANULOCYTES NFR BLD AUTO: 0.3 % (ref 0–0.9)
KETONES UR STRIP.AUTO-MCNC: NEGATIVE MG/DL
LACTATE SERPL-SCNC: 1.1 MMOL/L (ref 0.4–2)
LEUKOCYTE ESTERASE UR QL STRIP.AUTO: ABNORMAL
LIPASE SERPL-CCNC: 15 U/L (ref 9–82)
LYMPHOCYTES # BLD AUTO: 1.94 X10*3/UL (ref 1.2–4.8)
LYMPHOCYTES NFR BLD AUTO: 20 %
MCH RBC QN AUTO: 31 PG (ref 26–34)
MCHC RBC AUTO-ENTMCNC: 33.2 G/DL (ref 32–36)
MCV RBC AUTO: 94 FL (ref 80–100)
MONOCYTES # BLD AUTO: 0.98 X10*3/UL (ref 0.1–1)
MONOCYTES NFR BLD AUTO: 10.1 %
MUCOUS THREADS #/AREA URNS AUTO: ABNORMAL /LPF
NEUTROPHILS # BLD AUTO: 5.82 X10*3/UL (ref 1.2–7.7)
NEUTROPHILS NFR BLD AUTO: 60.2 %
NITRITE UR QL STRIP.AUTO: NEGATIVE
NRBC BLD-RTO: 0 /100 WBCS (ref 0–0)
PH UR STRIP.AUTO: 7.5 [PH]
PLATELET # BLD AUTO: 331 X10*3/UL (ref 150–450)
POTASSIUM SERPL-SCNC: 3.8 MMOL/L (ref 3.5–5.3)
PROT SERPL-MCNC: 6.7 G/DL (ref 6.4–8.2)
PROT UR STRIP.AUTO-MCNC: NEGATIVE MG/DL
RBC # BLD AUTO: 4.22 X10*6/UL (ref 4.5–5.9)
RBC # UR STRIP.AUTO: NEGATIVE /UL
RBC #/AREA URNS AUTO: ABNORMAL /HPF
SODIUM SERPL-SCNC: 131 MMOL/L (ref 136–145)
SP GR UR STRIP.AUTO: 1.01
UROBILINOGEN UR STRIP.AUTO-MCNC: NORMAL MG/DL
WBC # BLD AUTO: 9.7 X10*3/UL (ref 4.4–11.3)
WBC #/AREA URNS AUTO: ABNORMAL /HPF
WBC CLUMPS #/AREA URNS AUTO: ABNORMAL /HPF

## 2024-08-12 PROCEDURE — 96365 THER/PROPH/DIAG IV INF INIT: CPT | Mod: 59

## 2024-08-12 PROCEDURE — RXMED WILLOW AMBULATORY MEDICATION CHARGE

## 2024-08-12 PROCEDURE — 84075 ASSAY ALKALINE PHOSPHATASE: CPT | Performed by: EMERGENCY MEDICINE

## 2024-08-12 PROCEDURE — 99284 EMERGENCY DEPT VISIT MOD MDM: CPT | Mod: 25

## 2024-08-12 PROCEDURE — 2550000001 HC RX 255 CONTRASTS: Performed by: EMERGENCY MEDICINE

## 2024-08-12 PROCEDURE — 81001 URINALYSIS AUTO W/SCOPE: CPT | Performed by: EMERGENCY MEDICINE

## 2024-08-12 PROCEDURE — 83605 ASSAY OF LACTIC ACID: CPT | Performed by: EMERGENCY MEDICINE

## 2024-08-12 PROCEDURE — 83690 ASSAY OF LIPASE: CPT | Performed by: EMERGENCY MEDICINE

## 2024-08-12 PROCEDURE — 36415 COLL VENOUS BLD VENIPUNCTURE: CPT | Performed by: EMERGENCY MEDICINE

## 2024-08-12 PROCEDURE — 74177 CT ABD & PELVIS W/CONTRAST: CPT

## 2024-08-12 PROCEDURE — 87086 URINE CULTURE/COLONY COUNT: CPT | Mod: SAMLAB | Performed by: EMERGENCY MEDICINE

## 2024-08-12 PROCEDURE — 85025 COMPLETE CBC W/AUTO DIFF WBC: CPT | Performed by: EMERGENCY MEDICINE

## 2024-08-12 PROCEDURE — 2500000004 HC RX 250 GENERAL PHARMACY W/ HCPCS (ALT 636 FOR OP/ED): Performed by: EMERGENCY MEDICINE

## 2024-08-12 PROCEDURE — 74177 CT ABD & PELVIS W/CONTRAST: CPT | Mod: FOREIGN READ | Performed by: RADIOLOGY

## 2024-08-12 RX ORDER — CIPROFLOXACIN 500 MG/1
500 TABLET ORAL 2 TIMES DAILY
Qty: 14 TABLET | Refills: 0 | Status: SHIPPED | OUTPATIENT
Start: 2024-08-12 | End: 2024-08-19

## 2024-08-12 RX ORDER — SODIUM CHLORIDE 9 MG/ML
150 INJECTION, SOLUTION INTRAVENOUS CONTINUOUS
Status: DISCONTINUED | OUTPATIENT
Start: 2024-08-12 | End: 2024-08-12

## 2024-08-12 RX ORDER — HYDROCODONE BITARTRATE AND ACETAMINOPHEN 5; 325 MG/1; MG/1
1 TABLET ORAL EVERY 8 HOURS PRN
COMMUNITY

## 2024-08-12 RX ORDER — LOPERAMIDE HYDROCHLORIDE 2 MG/1
1 CAPSULE ORAL AS NEEDED
COMMUNITY

## 2024-08-12 RX ORDER — ONDANSETRON HYDROCHLORIDE 2 MG/ML
4 INJECTION, SOLUTION INTRAVENOUS ONCE
Status: DISCONTINUED | OUTPATIENT
Start: 2024-08-12 | End: 2024-08-12

## 2024-08-12 RX ORDER — CIPROFLOXACIN 500 MG/1
1 TABLET ORAL 2 TIMES DAILY
COMMUNITY
End: 2024-08-12 | Stop reason: ENTERED-IN-ERROR

## 2024-08-12 RX ORDER — PHENAZOPYRIDINE HYDROCHLORIDE 200 MG/1
200 TABLET, FILM COATED ORAL 3 TIMES DAILY PRN
Qty: 9 TABLET | Refills: 0 | Status: SHIPPED | OUTPATIENT
Start: 2024-08-12 | End: 2024-08-15

## 2024-08-12 RX ORDER — MORPHINE SULFATE 4 MG/ML
4 INJECTION, SOLUTION INTRAMUSCULAR; INTRAVENOUS ONCE
Status: DISCONTINUED | OUTPATIENT
Start: 2024-08-12 | End: 2024-08-12

## 2024-08-12 RX ORDER — CIPROFLOXACIN 2 MG/ML
400 INJECTION, SOLUTION INTRAVENOUS ONCE
Status: COMPLETED | OUTPATIENT
Start: 2024-08-12 | End: 2024-08-12

## 2024-08-12 RX ORDER — ASPIRIN 81 MG
1 TABLET, DELAYED RELEASE (ENTERIC COATED) ORAL DAILY
COMMUNITY
End: 2024-08-12 | Stop reason: ENTERED-IN-ERROR

## 2024-08-12 RX ORDER — ONDANSETRON 4 MG/1
1 TABLET, FILM COATED ORAL EVERY 6 HOURS PRN
COMMUNITY

## 2024-08-12 ASSESSMENT — VISUAL ACUITY: OU: 1

## 2024-08-12 ASSESSMENT — PAIN - FUNCTIONAL ASSESSMENT: PAIN_FUNCTIONAL_ASSESSMENT: 0-10

## 2024-08-12 ASSESSMENT — PAIN SCALES - GENERAL
PAINLEVEL_OUTOF10: 7
PAINLEVEL_OUTOF10: 0 - NO PAIN

## 2024-08-12 ASSESSMENT — COLUMBIA-SUICIDE SEVERITY RATING SCALE - C-SSRS
1. IN THE PAST MONTH, HAVE YOU WISHED YOU WERE DEAD OR WISHED YOU COULD GO TO SLEEP AND NOT WAKE UP?: NO
6. HAVE YOU EVER DONE ANYTHING, STARTED TO DO ANYTHING, OR PREPARED TO DO ANYTHING TO END YOUR LIFE?: NO
2. HAVE YOU ACTUALLY HAD ANY THOUGHTS OF KILLING YOURSELF?: NO

## 2024-08-12 NOTE — ED PROVIDER NOTES
Abdominal pain.  This 64-year-old white male with history of MS and suprapubic catheter presents to the ED with complaints of abdominal pain symptoms located in the lower quadrants that began 3 hours prior to arrival to the ED.  Patient states that he has some associated nausea without vomiting.  Patient states that he was concerned initially that his suprapubic catheter was not draining and was causing his discomfort.  He states that since onset of symptoms is symptoms have improved.  States that nothing made his symptoms better or worse.  He denies any fever, chills or urinary symptoms.      History provided by:  Patient   used: No         Physical Exam  Vitals and nursing note reviewed.   Constitutional:       General: He is awake.      Appearance: Normal appearance. He is normal weight.   HENT:      Head: Normocephalic and atraumatic.      Right Ear: Hearing and external ear normal.      Left Ear: Hearing and external ear normal.      Nose: Nose normal. No congestion or rhinorrhea.      Mouth/Throat:      Lips: Pink.      Mouth: Mucous membranes are moist.      Pharynx: Oropharynx is clear. Uvula midline. No oropharyngeal exudate or posterior oropharyngeal erythema.   Eyes:      General: Lids are normal. Vision grossly intact.         Right eye: No discharge.         Left eye: No discharge.      Extraocular Movements: Extraocular movements intact.      Conjunctiva/sclera: Conjunctivae normal.      Pupils: Pupils are equal, round, and reactive to light.   Cardiovascular:      Rate and Rhythm: Normal rate and regular rhythm.      Pulses: Normal pulses.      Heart sounds: Normal heart sounds. No murmur heard.     No friction rub. No gallop.   Pulmonary:      Effort: Pulmonary effort is normal. No respiratory distress.      Breath sounds: Normal breath sounds. No stridor. No wheezing, rhonchi or rales.   Chest:      Chest wall: No tenderness.   Abdominal:      General: Abdomen is protuberant.  Bowel sounds are normal. There is no distension.      Palpations: Abdomen is soft. There is no mass.      Tenderness: There is abdominal tenderness in the right lower quadrant, suprapubic area and left lower quadrant. There is no right CVA tenderness, left CVA tenderness, guarding or rebound.      Hernia: No hernia is present.      Comments: Patient has a suprapubic catheter in place that appears to be draining well.  There is no evidence of surrounding secondary infection.   Musculoskeletal:         General: No swelling, tenderness, deformity or signs of injury. Normal range of motion.      Cervical back: Full passive range of motion without pain, normal range of motion and neck supple.      Right lower le+ Edema present.      Left lower le+ Edema present.   Skin:     General: Skin is warm and dry.      Capillary Refill: Capillary refill takes less than 2 seconds.      Coloration: Skin is not jaundiced or pale.      Findings: No bruising, erythema, lesion or rash.   Neurological:      General: No focal deficit present.      Mental Status: He is alert and oriented to person, place, and time.      GCS: GCS eye subscore is 4. GCS verbal subscore is 5. GCS motor subscore is 6.      Cranial Nerves: No cranial nerve deficit.      Sensory: No sensory deficit.      Motor: Weakness present.      Coordination: Coordination normal.      Deep Tendon Reflexes: Reflexes normal.      Comments: Patient has weakness of both lower extremities with atrophy due to history of MS.   Psychiatric:         Attention and Perception: Attention and perception normal.         Mood and Affect: Mood and affect normal.         Speech: Speech normal.         Behavior: Behavior normal. Behavior is cooperative.         Thought Content: Thought content normal.         Judgment: Judgment normal.          Labs Reviewed   COMPREHENSIVE METABOLIC PANEL - Abnormal       Result Value    Glucose 94      Sodium 131 (*)     Potassium 3.8      Chloride  99      Bicarbonate 26      Anion Gap 10      Urea Nitrogen 13      Creatinine 0.41 (*)     eGFR >90      Calcium 8.8      Albumin 3.7      Alkaline Phosphatase 115      Total Protein 6.7      AST 23      Bilirubin, Total 0.3      ALT 31     CBC WITH AUTO DIFFERENTIAL - Abnormal    WBC 9.7      nRBC 0.0      RBC 4.22 (*)     Hemoglobin 13.1 (*)     Hematocrit 39.5 (*)     MCV 94      MCH 31.0      MCHC 33.2      RDW 13.3      Platelets 331      Neutrophils % 60.2      Immature Granulocytes %, Automated 0.3      Lymphocytes % 20.0      Monocytes % 10.1      Eosinophils % 8.6      Basophils % 0.8      Neutrophils Absolute 5.82      Immature Granulocytes Absolute, Automated 0.03      Lymphocytes Absolute 1.94      Monocytes Absolute 0.98      Eosinophils Absolute 0.83 (*)     Basophils Absolute 0.08     URINALYSIS WITH REFLEX CULTURE AND MICROSCOPIC - Abnormal    Color, Urine Colorless (*)     Appearance, Urine Clear      Specific Gravity, Urine 1.009      pH, Urine 7.5      Protein, Urine NEGATIVE      Glucose, Urine Normal      Blood, Urine NEGATIVE      Ketones, Urine NEGATIVE      Bilirubin, Urine NEGATIVE      Urobilinogen, Urine Normal      Nitrite, Urine NEGATIVE      Leukocyte Esterase, Urine 500 Silvestre/µL (*)    MICROSCOPIC ONLY, URINE - Abnormal    WBC, Urine 21-50 (*)     WBC Clumps, Urine RARE      RBC, Urine 1-2      Bacteria, Urine 1+ (*)     Mucus, Urine FEW     LACTATE - Normal    Lactate 1.1      Narrative:     Venipuncture immediately after or during the administration of Metamizole may lead to falsely low results. Testing should be performed immediately  prior to Metamizole dosing.   LIPASE - Normal    Lipase 15      Narrative:     Venipuncture immediately after or during the administration of Metamizole may lead to falsely low results. Testing should be performed immediately prior to Metamizole dosing.   URINE CULTURE   URINALYSIS WITH REFLEX CULTURE AND MICROSCOPIC    Narrative:     The following  orders were created for panel order Urinalysis with Reflex Culture and Microscopic.  Procedure                               Abnormality         Status                     ---------                               -----------         ------                     Urinalysis with Reflex C...[[id number]]  Abnormal            Final result               Extra Urine Gray Tube[[id number]]                            In process                   Please view results for these tests on the individual orders.   EXTRA URINE GRAY TUBE        CT abdomen pelvis w IV contrast    (Results Pending)        Procedures     Medical Decision Making  Patient was seen and evaluated due to complaints of abdominal pain.  Patient was concerned as suprapubic catheter was not draining or about once he arrived to the ED it was noted the patient's suprapubic catheter was draining appropriately.  Patient was ordered blood work, urinalysis and a CT scan of the abdomen pelvis.  Urinalysis is not truly indicative of an infection white cell count was normal at 9.7 hemoglobin slightly low at 13.1.  CMP revealed a sodium of 131.  CT scan imaging of the abdomen pelvis is still pending when patient care was turned over to the oncoming ED attending at 7 AM.                          Pillo Conner,   08/12/24 0725

## 2024-08-12 NOTE — PROGRESS NOTES
Emergency Medicine Transition of Care Note.    I received Raheem Hilton in signout from Dr. PADILLA.  Please see the previous ED provider note for all HPI, PE and MDM up to the time of signout at 7AM. This is in addition to the primary record.    In brief Raheem Hilton is an 64 y.o. male presenting for   Chief Complaint   Patient presents with    Abdominal Pain     Pt here via EMS from [facility], pt states he started having lower abdominal pain 3 hours ago. Has an SP tube, currently draining without issues. Denies changes to his bowels.      At the time of signout we were awaiting: CT SCAN    Diagnoses as of 08/12/24 0844   UTI (urinary tract infection), bacterial   Cystitis   Renal calculi       Medical Decision Making  The patient signed out to me by Dr. Padilla awaiting CT scan results.  His urinalysis shows significant UTI with 21-50 white cells per high-powered field with 1+ bacteria.  His metabolic panel and CBC were otherwise unremarkable.  CT scan shows renal calculi but otherwise a suprapubic catheter with what appears to be cystitis.  He has some chronic changes in the bony structure of his buttocks and sacral areas.  He received Cipro 500 mg intravenously will be started on Cipro and Pyridium for his UTI cystitis and will be followed up by Dr. Stubbs his primary care physician        Final diagnoses:   [N39.0, A49.9] UTI (urinary tract infection), bacterial   [N30.90] Cystitis   [N20.0] Renal calculi           Procedure  Procedures    Jovany Moralez MD

## 2024-08-13 LAB — BACTERIA UR CULT: ABNORMAL

## 2024-08-20 ENCOUNTER — ANESTHESIA (OUTPATIENT)
Dept: OPERATING ROOM | Facility: HOSPITAL | Age: 64
End: 2024-08-20
Payer: MEDICARE

## 2024-08-20 ENCOUNTER — ANESTHESIA EVENT (OUTPATIENT)
Dept: OPERATING ROOM | Facility: HOSPITAL | Age: 64
End: 2024-08-20
Payer: MEDICARE

## 2024-08-20 ENCOUNTER — HOSPITAL ENCOUNTER (OUTPATIENT)
Facility: HOSPITAL | Age: 64
Setting detail: OUTPATIENT SURGERY
Discharge: NURSING, REHAB OR LONG-TERM CARE | End: 2024-08-20
Attending: UROLOGY | Admitting: UROLOGY
Payer: MEDICARE

## 2024-08-20 VITALS
BODY MASS INDEX: 22.53 KG/M2 | DIASTOLIC BLOOD PRESSURE: 81 MMHG | RESPIRATION RATE: 16 BRPM | HEIGHT: 73 IN | OXYGEN SATURATION: 97 % | WEIGHT: 170 LBS | SYSTOLIC BLOOD PRESSURE: 142 MMHG | TEMPERATURE: 96.9 F | HEART RATE: 57 BPM

## 2024-08-20 DIAGNOSIS — R97.20 ELEVATED PSA: ICD-10-CM

## 2024-08-20 PROCEDURE — 2500000005 HC RX 250 GENERAL PHARMACY W/O HCPCS: Performed by: ANESTHESIOLOGY

## 2024-08-20 PROCEDURE — C1889 IMPLANT/INSERT DEVICE, NOC: HCPCS | Performed by: UROLOGY

## 2024-08-20 PROCEDURE — 3600000009 HC OR TIME - EACH INCREMENTAL 1 MINUTE - PROCEDURE LEVEL FOUR: Performed by: UROLOGY

## 2024-08-20 PROCEDURE — C1819 TISSUE LOCALIZATION-EXCISION: HCPCS | Performed by: UROLOGY

## 2024-08-20 PROCEDURE — 7100000009 HC PHASE TWO TIME - INITIAL BASE CHARGE: Performed by: UROLOGY

## 2024-08-20 PROCEDURE — 3700000002 HC GENERAL ANESTHESIA TIME - EACH INCREMENTAL 1 MINUTE: Performed by: UROLOGY

## 2024-08-20 PROCEDURE — 2500000004 HC RX 250 GENERAL PHARMACY W/ HCPCS (ALT 636 FOR OP/ED): Performed by: ANESTHESIOLOGY

## 2024-08-20 PROCEDURE — 3600000004 HC OR TIME - INITIAL BASE CHARGE - PROCEDURE LEVEL FOUR: Performed by: UROLOGY

## 2024-08-20 PROCEDURE — 2720000007 HC OR 272 NO HCPCS: Performed by: UROLOGY

## 2024-08-20 PROCEDURE — 7100000010 HC PHASE TWO TIME - EACH INCREMENTAL 1 MINUTE: Performed by: UROLOGY

## 2024-08-20 PROCEDURE — 2780000003 HC OR 278 NO HCPCS: Performed by: UROLOGY

## 2024-08-20 PROCEDURE — 76872 US TRANSRECTAL: CPT | Performed by: UROLOGY

## 2024-08-20 PROCEDURE — 3700000001 HC GENERAL ANESTHESIA TIME - INITIAL BASE CHARGE: Performed by: UROLOGY

## 2024-08-20 RX ORDER — MORPHINE SULFATE 4 MG/ML
4 INJECTION, SOLUTION INTRAMUSCULAR; INTRAVENOUS EVERY 5 MIN PRN
Status: CANCELLED | OUTPATIENT
Start: 2024-08-20

## 2024-08-20 RX ORDER — SODIUM CHLORIDE, SODIUM LACTATE, POTASSIUM CHLORIDE, CALCIUM CHLORIDE 600; 310; 30; 20 MG/100ML; MG/100ML; MG/100ML; MG/100ML
20 INJECTION, SOLUTION INTRAVENOUS CONTINUOUS
Status: DISCONTINUED | OUTPATIENT
Start: 2024-08-20 | End: 2024-08-20 | Stop reason: HOSPADM

## 2024-08-20 RX ORDER — ONDANSETRON HYDROCHLORIDE 2 MG/ML
4 INJECTION, SOLUTION INTRAVENOUS ONCE AS NEEDED
Status: CANCELLED | OUTPATIENT
Start: 2024-08-20

## 2024-08-20 RX ORDER — MORPHINE SULFATE 4 MG/ML
2 INJECTION, SOLUTION INTRAMUSCULAR; INTRAVENOUS EVERY 5 MIN PRN
Status: CANCELLED | OUTPATIENT
Start: 2024-08-20

## 2024-08-20 RX ORDER — OXYCODONE HYDROCHLORIDE 5 MG/1
5 TABLET ORAL EVERY 4 HOURS PRN
Status: CANCELLED | OUTPATIENT
Start: 2024-08-20

## 2024-08-20 RX ORDER — SODIUM CHLORIDE, SODIUM LACTATE, POTASSIUM CHLORIDE, CALCIUM CHLORIDE 600; 310; 30; 20 MG/100ML; MG/100ML; MG/100ML; MG/100ML
100 INJECTION, SOLUTION INTRAVENOUS CONTINUOUS
Status: CANCELLED | OUTPATIENT
Start: 2024-08-20

## 2024-08-20 RX ORDER — LABETALOL HYDROCHLORIDE 5 MG/ML
5 INJECTION, SOLUTION INTRAVENOUS ONCE AS NEEDED
Status: CANCELLED | OUTPATIENT
Start: 2024-08-20

## 2024-08-20 RX ORDER — PROPOFOL 10 MG/ML
INJECTION, EMULSION INTRAVENOUS CONTINUOUS PRN
Status: DISCONTINUED | OUTPATIENT
Start: 2024-08-20 | End: 2024-08-20

## 2024-08-20 RX ORDER — ACETAMINOPHEN 325 MG/1
975 TABLET ORAL ONCE
Status: COMPLETED | OUTPATIENT
Start: 2024-08-20 | End: 2024-08-20

## 2024-08-20 ASSESSMENT — PAIN - FUNCTIONAL ASSESSMENT
PAIN_FUNCTIONAL_ASSESSMENT: 0-10

## 2024-08-20 ASSESSMENT — COLUMBIA-SUICIDE SEVERITY RATING SCALE - C-SSRS
2. HAVE YOU ACTUALLY HAD ANY THOUGHTS OF KILLING YOURSELF?: NO
6. HAVE YOU EVER DONE ANYTHING, STARTED TO DO ANYTHING, OR PREPARED TO DO ANYTHING TO END YOUR LIFE?: NO
1. IN THE PAST MONTH, HAVE YOU WISHED YOU WERE DEAD OR WISHED YOU COULD GO TO SLEEP AND NOT WAKE UP?: NO

## 2024-08-20 ASSESSMENT — PAIN SCALES - GENERAL
PAINLEVEL_OUTOF10: 0 - NO PAIN

## 2024-08-20 NOTE — OP NOTE
Biopsy Prostate with Navigation, Ultrasonography Transrectal Prostate, Ultrasound Guidance for prostate fusion biopsy Operative Note     Date: 2024  OR Location: [facility] OR    Name: Raheem Hilton, : 1960, Age: 64 y.o., MRN: [id number], Sex: male    Diagnosis  Pre-op Diagnosis      * Elevated PSA [R97.20] Post-op Diagnosis     * Elevated PSA [R97.20]     Procedures  Biopsy Prostate with Navigation  14794 - OR PROSTATE NEEDLE BIOPSY ANY APPROACH    Ultrasonography Transrectal Prostate  75047 - CHG US TRANSRECTAL    Ultrasound Guidance for prostate fusion biopsy  11277 - CHG US GUIDANCE NEEDLE PLACEMENT IMG S&I      Surgeons      * Jovany Nielson - Primary    Resident/Fellow/Other Assistant:  Surgeons and Role:  * No surgeons found with a matching role *    Procedure Summary  Anesthesia: Monitor Anesthesia Care  ASA: III  Anesthesia Staff: Anesthesiologist: Luca Mccoy MD  Estimated Blood Loss: 0mL  Intra-op Medications: Administrations occurring from 0900 to 0915 on 24:  * No intraprocedure medications in log *           Anesthesia Record               Intraprocedure I/O Totals          Intake    Propofol Drip 0.00 mL    The total shown is the total volume documented since Anesthesia Start was filed.    Total Intake 0 mL          Specimen: No specimens collected     Staff:   Scrub Person: Phillip  Circulator: Lena  Circulator: Charity         Drains and/or Catheters:   Suprapubic Catheter 18 Fr. (Active)   Dressing Status Clean;Dry 24 0820   Collection Container Standard drainage bag 24 0820       Indications: Raheem Hilton is an 64 y.o. male who is having surgery for Elevated PSA [R97.20].     The patient was seen in the preoperative area. The risks, benefits, complications, treatment options, non-operative alternatives, expected recovery and outcomes were discussed with the patient. The possibilities of reaction to medication, pulmonary aspiration, injury to surrounding structures,  bleeding, recurrent infection, the need for additional procedures, failure to diagnose a condition, and creating a complication requiring transfusion or operation were discussed with the patient. The patient concurred with the proposed plan, giving informed consent.  The site of surgery was properly noted/marked if necessary per policy. The patient has been actively warmed in preoperative area.   Pre Op dx: Elevated PSA and Abnormal MRI of the prostate    Post Op Dx: SAME    Procedure: MRI Guided Fusion Bx of the prostate    Physician: ELIZABETH    Anesthesia: MAC    Estimated Blood Loss: Minimal    Complications: NONE    Indications and Consent: Patient present for prostate Biopsy of lesion found on MRI. After the risks,, benefits, and indications were explained he consented to the procedure.    PROCEDURE: After adequate sedation was obtained the ultrasound probe was inserted into the rectum. Due to large amount of stool, the procedure was terminated and will have patient do a longer bowel prep at the reschedule.    Attending Attestation: I was present and scrubbed for the entire procedure.    Jovany Nielson  Phone Number: [phone]

## 2024-08-20 NOTE — ANESTHESIA PREPROCEDURE EVALUATION
Patient: Raheem Hilton    Procedure Information       Date/Time: 08/20/24 0900    Procedures:       Biopsy Prostate with Navigation      Ultrasonography Transrectal Prostate      Ultrasound Guidance for prostate fusion biopsy    Location: [facility] OR 01 / [facility]    Surgeons: Jovany Nielson MD            Relevant Problems   Anesthesia (within normal limits)      Neuro   (+) Multiple sclerosis (Multi)      /Renal   (+) Urinary tract infection with hematuria, site unspecified      ID   (+) Urinary tract infection with hematuria, site unspecified       Clinical information reviewed:                   NPO Detail:  No data recorded     Physical Exam    Airway  Mallampati: II  TM distance: >3 FB     Cardiovascular   Rhythm: regular  Rate: normal     Dental   (+) upper dentures     Pulmonary    Abdominal            Anesthesia Plan    History of general anesthesia?: yes  History of complications of general anesthesia?: no    ASA 3     MAC     Anesthetic plan and risks discussed with patient.

## 2024-08-20 NOTE — ANESTHESIA POSTPROCEDURE EVALUATION
Patient: Raheem Hilton    Procedure Summary       Date: 08/20/24 Room / Location: [facility] OR 01 / [facility]    Anesthesia Start: 0911 Anesthesia Stop: 0921    Procedures:       Biopsy Prostate with Navigation      Ultrasonography Transrectal Prostate      Ultrasound Guidance for prostate fusion biopsy Diagnosis:       Elevated PSA      (Elevated PSA [R97.20])    Surgeons: Jovany Nielson MD Responsible Provider: Luca Mccoy MD    Anesthesia Type: MAC ASA Status: 3            Anesthesia Type: MAC    Vitals Value Taken Time   Vitals:    08/20/24 0802   BP: (!) 167/94   Pulse: 66   Resp: 24   Temp: 36.5 °C (97.7 °F)   SpO2: 98%       08/20/24 0930     08/20/24 0930     08/20/24 0930     08/20/24 0930     08/20/24 0930       Anesthesia Post Evaluation    Patient location during evaluation: bedside  Patient participation: complete - patient participated  Level of consciousness: sleepy but conscious  Pain management: adequate  Airway patency: patent  Cardiovascular status: acceptable  Respiratory status: acceptable  Hydration status: acceptable  Postoperative Nausea and Vomiting: none      No notable events documented.

## 2024-08-27 ENCOUNTER — LAB REQUISITION (OUTPATIENT)
Dept: LAB | Facility: HOSPITAL | Age: 64
End: 2024-08-27
Payer: MEDICARE

## 2024-08-27 DIAGNOSIS — G35 MULTIPLE SCLEROSIS (MULTI): ICD-10-CM

## 2024-08-27 PROCEDURE — 87185 SC STD ENZYME DETCJ PER NZM: CPT | Mod: OUT,SAMLAB | Performed by: FAMILY MEDICINE

## 2024-08-27 PROCEDURE — 87086 URINE CULTURE/COLONY COUNT: CPT | Mod: OUT,SAMLAB | Performed by: FAMILY MEDICINE

## 2024-08-28 LAB — BACTERIA UR CULT: ABNORMAL

## 2024-08-29 LAB
B-LACTAMASE ORGANISM ISLT: POSITIVE
BACTERIA SPEC CULT: ABNORMAL
GRAM STN SPEC: ABNORMAL
GRAM STN SPEC: ABNORMAL

## 2024-09-05 ENCOUNTER — HOSPITAL ENCOUNTER (OUTPATIENT)
Facility: HOSPITAL | Age: 64
Setting detail: OUTPATIENT SURGERY
End: 2024-09-05
Attending: UROLOGY | Admitting: UROLOGY
Payer: MEDICARE

## 2024-09-05 ENCOUNTER — PREP FOR PROCEDURE (OUTPATIENT)
Dept: UROLOGY | Facility: CLINIC | Age: 64
End: 2024-09-05
Payer: MEDICARE

## 2024-09-05 DIAGNOSIS — R97.20 ELEVATED PSA: ICD-10-CM

## 2024-09-05 RX ORDER — SODIUM CHLORIDE, SODIUM LACTATE, POTASSIUM CHLORIDE, CALCIUM CHLORIDE 600; 310; 30; 20 MG/100ML; MG/100ML; MG/100ML; MG/100ML
20 INJECTION, SOLUTION INTRAVENOUS CONTINUOUS
OUTPATIENT
Start: 2024-09-05

## 2024-09-09 ENCOUNTER — HOSPITAL ENCOUNTER (INPATIENT)
Facility: HOSPITAL | Age: 64
LOS: 6 days | Discharge: HOME | End: 2024-09-15
Attending: HOSPITALIST | Admitting: HOSPITALIST
Payer: MEDICARE

## 2024-09-09 DIAGNOSIS — N39.0 UTI (URINARY TRACT INFECTION): ICD-10-CM

## 2024-09-09 DIAGNOSIS — L89.154 PRESSURE INJURY OF SACRAL REGION, STAGE 4 (MULTI): Primary | ICD-10-CM

## 2024-09-09 LAB
APPEARANCE UR: ABNORMAL
BACTERIA #/AREA URNS AUTO: ABNORMAL /HPF
BILIRUB UR STRIP.AUTO-MCNC: NEGATIVE MG/DL
COLOR UR: ABNORMAL
GLUCOSE UR STRIP.AUTO-MCNC: NORMAL MG/DL
KETONES UR STRIP.AUTO-MCNC: NEGATIVE MG/DL
LACTATE SERPL-SCNC: 1 MMOL/L (ref 0.4–2)
LEUKOCYTE ESTERASE UR QL STRIP.AUTO: ABNORMAL
MUCOUS THREADS #/AREA URNS AUTO: ABNORMAL /LPF
NITRITE UR QL STRIP.AUTO: NEGATIVE
PH UR STRIP.AUTO: 6.5 [PH]
PROT UR STRIP.AUTO-MCNC: ABNORMAL MG/DL
RBC # UR STRIP.AUTO: ABNORMAL /UL
RBC #/AREA URNS AUTO: ABNORMAL /HPF
SP GR UR STRIP.AUTO: 1.01
UROBILINOGEN UR STRIP.AUTO-MCNC: NORMAL MG/DL
WBC #/AREA URNS AUTO: >50 /HPF
WBC CLUMPS #/AREA URNS AUTO: ABNORMAL /HPF

## 2024-09-09 PROCEDURE — 36415 COLL VENOUS BLD VENIPUNCTURE: CPT | Performed by: HOSPITALIST

## 2024-09-09 PROCEDURE — 87040 BLOOD CULTURE FOR BACTERIA: CPT | Mod: SAMLAB | Performed by: HOSPITALIST

## 2024-09-09 PROCEDURE — 2500000004 HC RX 250 GENERAL PHARMACY W/ HCPCS (ALT 636 FOR OP/ED): Performed by: HOSPITALIST

## 2024-09-09 PROCEDURE — 99223 1ST HOSP IP/OBS HIGH 75: CPT | Performed by: HOSPITALIST

## 2024-09-09 PROCEDURE — 81001 URINALYSIS AUTO W/SCOPE: CPT | Performed by: HOSPITALIST

## 2024-09-09 PROCEDURE — 2500000001 HC RX 250 WO HCPCS SELF ADMINISTERED DRUGS (ALT 637 FOR MEDICARE OP): Performed by: HOSPITALIST

## 2024-09-09 PROCEDURE — 1200000002 HC GENERAL ROOM WITH TELEMETRY DAILY

## 2024-09-09 PROCEDURE — 83605 ASSAY OF LACTIC ACID: CPT | Performed by: HOSPITALIST

## 2024-09-09 RX ORDER — ENOXAPARIN SODIUM 100 MG/ML
40 INJECTION SUBCUTANEOUS DAILY
Status: DISCONTINUED | OUTPATIENT
Start: 2024-09-09 | End: 2024-09-15 | Stop reason: HOSPADM

## 2024-09-09 RX ORDER — METHIMAZOLE 5 MG/1
5 TABLET ORAL 2 TIMES DAILY
Status: DISCONTINUED | OUTPATIENT
Start: 2024-09-09 | End: 2024-09-15 | Stop reason: HOSPADM

## 2024-09-09 RX ORDER — ONDANSETRON HYDROCHLORIDE 2 MG/ML
4 INJECTION, SOLUTION INTRAVENOUS EVERY 8 HOURS PRN
Status: DISCONTINUED | OUTPATIENT
Start: 2024-09-09 | End: 2024-09-15 | Stop reason: HOSPADM

## 2024-09-09 RX ORDER — ATORVASTATIN CALCIUM 80 MG/1
80 TABLET, FILM COATED ORAL NIGHTLY
Status: DISCONTINUED | OUTPATIENT
Start: 2024-09-09 | End: 2024-09-15 | Stop reason: HOSPADM

## 2024-09-09 RX ORDER — HYDROCODONE BITARTRATE AND ACETAMINOPHEN 5; 325 MG/1; MG/1
1 TABLET ORAL EVERY 8 HOURS PRN
Status: DISCONTINUED | OUTPATIENT
Start: 2024-09-09 | End: 2024-09-15 | Stop reason: HOSPADM

## 2024-09-09 RX ORDER — ACETAMINOPHEN 160 MG/5ML
650 SOLUTION ORAL EVERY 4 HOURS PRN
Status: DISCONTINUED | OUTPATIENT
Start: 2024-09-09 | End: 2024-09-15 | Stop reason: HOSPADM

## 2024-09-09 RX ORDER — ACETAMINOPHEN 650 MG/1
650 SUPPOSITORY RECTAL EVERY 4 HOURS PRN
Status: DISCONTINUED | OUTPATIENT
Start: 2024-09-09 | End: 2024-09-15 | Stop reason: HOSPADM

## 2024-09-09 RX ORDER — METOPROLOL SUCCINATE 25 MG/1
25 TABLET, EXTENDED RELEASE ORAL DAILY
Status: DISCONTINUED | OUTPATIENT
Start: 2024-09-09 | End: 2024-09-15 | Stop reason: HOSPADM

## 2024-09-09 RX ORDER — SODIUM CHLORIDE 9 MG/ML
100 INJECTION, SOLUTION INTRAVENOUS CONTINUOUS
Status: DISCONTINUED | OUTPATIENT
Start: 2024-09-09 | End: 2024-09-11

## 2024-09-09 RX ORDER — LOPERAMIDE HYDROCHLORIDE 2 MG/1
2 CAPSULE ORAL AS NEEDED
Status: DISCONTINUED | OUTPATIENT
Start: 2024-09-09 | End: 2024-09-15 | Stop reason: HOSPADM

## 2024-09-09 RX ORDER — POLYETHYLENE GLYCOL 3350 17 G/17G
17 POWDER, FOR SOLUTION ORAL DAILY
Status: DISCONTINUED | OUTPATIENT
Start: 2024-09-09 | End: 2024-09-15 | Stop reason: HOSPADM

## 2024-09-09 RX ORDER — ACETAMINOPHEN 325 MG/1
650 TABLET ORAL EVERY 4 HOURS PRN
Status: DISCONTINUED | OUTPATIENT
Start: 2024-09-09 | End: 2024-09-15 | Stop reason: HOSPADM

## 2024-09-09 RX ORDER — GABAPENTIN 300 MG/1
600 CAPSULE ORAL 3 TIMES DAILY
Status: DISCONTINUED | OUTPATIENT
Start: 2024-09-09 | End: 2024-09-15 | Stop reason: HOSPADM

## 2024-09-09 RX ORDER — BACLOFEN 10 MG/1
10 TABLET ORAL 3 TIMES DAILY
Status: DISCONTINUED | OUTPATIENT
Start: 2024-09-09 | End: 2024-09-15 | Stop reason: HOSPADM

## 2024-09-09 RX ORDER — CARBAMAZEPINE 200 MG/1
200 TABLET ORAL 2 TIMES DAILY
Status: DISCONTINUED | OUTPATIENT
Start: 2024-09-09 | End: 2024-09-15 | Stop reason: HOSPADM

## 2024-09-09 RX ORDER — ONDANSETRON 4 MG/1
4 TABLET, ORALLY DISINTEGRATING ORAL EVERY 8 HOURS PRN
Status: DISCONTINUED | OUTPATIENT
Start: 2024-09-09 | End: 2024-09-15 | Stop reason: HOSPADM

## 2024-09-09 SDOH — SOCIAL STABILITY: SOCIAL INSECURITY: HAVE YOU HAD THOUGHTS OF HARMING ANYONE ELSE?: NO

## 2024-09-09 SDOH — SOCIAL STABILITY: SOCIAL INSECURITY: DO YOU FEEL UNSAFE GOING BACK TO THE PLACE WHERE YOU ARE LIVING?: NO

## 2024-09-09 SDOH — SOCIAL STABILITY: SOCIAL INSECURITY: HAVE YOU HAD ANY THOUGHTS OF HARMING ANYONE ELSE?: NO

## 2024-09-09 SDOH — SOCIAL STABILITY: SOCIAL INSECURITY: HAS ANYONE EVER THREATENED TO HURT YOUR FAMILY OR YOUR PETS?: NO

## 2024-09-09 SDOH — SOCIAL STABILITY: SOCIAL INSECURITY: DOES ANYONE TRY TO KEEP YOU FROM HAVING/CONTACTING OTHER FRIENDS OR DOING THINGS OUTSIDE YOUR HOME?: NO

## 2024-09-09 SDOH — SOCIAL STABILITY: SOCIAL INSECURITY: ARE THERE ANY APPARENT SIGNS OF INJURIES/BEHAVIORS THAT COULD BE RELATED TO ABUSE/NEGLECT?: NO

## 2024-09-09 SDOH — SOCIAL STABILITY: SOCIAL INSECURITY: ARE YOU OR HAVE YOU BEEN THREATENED OR ABUSED PHYSICALLY, EMOTIONALLY, OR SEXUALLY BY ANYONE?: NO

## 2024-09-09 SDOH — SOCIAL STABILITY: SOCIAL INSECURITY: ABUSE: ADULT

## 2024-09-09 SDOH — SOCIAL STABILITY: SOCIAL INSECURITY: DO YOU FEEL ANYONE HAS EXPLOITED OR TAKEN ADVANTAGE OF YOU FINANCIALLY OR OF YOUR PERSONAL PROPERTY?: NO

## 2024-09-09 SDOH — SOCIAL STABILITY: SOCIAL INSECURITY: WERE YOU ABLE TO COMPLETE ALL THE BEHAVIORAL HEALTH SCREENINGS?: YES

## 2024-09-09 ASSESSMENT — ACTIVITIES OF DAILY LIVING (ADL)
DRESSING YOURSELF: DEPENDENT
ASSISTIVE_DEVICE: EYEGLASSES;WHEELCHAIR
ADEQUATE_TO_COMPLETE_ADL: YES
HEARING - LEFT EAR: FUNCTIONAL
FEEDING YOURSELF: DEPENDENT
HEARING - RIGHT EAR: FUNCTIONAL
PATIENT'S MEMORY ADEQUATE TO SAFELY COMPLETE DAILY ACTIVITIES?: YES
GROOMING: DEPENDENT
JUDGMENT_ADEQUATE_SAFELY_COMPLETE_DAILY_ACTIVITIES: YES
TOILETING: DEPENDENT
WALKS IN HOME: DEPENDENT
BATHING: DEPENDENT

## 2024-09-09 ASSESSMENT — LIFESTYLE VARIABLES
AUDIT-C TOTAL SCORE: 0
SUBSTANCE_ABUSE_PAST_12_MONTHS: NO
PRESCIPTION_ABUSE_PAST_12_MONTHS: NO
HOW OFTEN DO YOU HAVE 6 OR MORE DRINKS ON ONE OCCASION: NEVER
AUDIT-C TOTAL SCORE: 0
HOW MANY STANDARD DRINKS CONTAINING ALCOHOL DO YOU HAVE ON A TYPICAL DAY: PATIENT DOES NOT DRINK
HOW OFTEN DO YOU HAVE A DRINK CONTAINING ALCOHOL: NEVER
SKIP TO QUESTIONS 9-10: 1

## 2024-09-09 ASSESSMENT — PAIN SCALES - GENERAL
PAINLEVEL_OUTOF10: 0 - NO PAIN
PAINLEVEL_OUTOF10: 0 - NO PAIN

## 2024-09-09 ASSESSMENT — PATIENT HEALTH QUESTIONNAIRE - PHQ9
2. FEELING DOWN, DEPRESSED OR HOPELESS: NOT AT ALL
1. LITTLE INTEREST OR PLEASURE IN DOING THINGS: NOT AT ALL
SUM OF ALL RESPONSES TO PHQ9 QUESTIONS 1 & 2: 0

## 2024-09-09 ASSESSMENT — PAIN - FUNCTIONAL ASSESSMENT
PAIN_FUNCTIONAL_ASSESSMENT: 0-10
PAIN_FUNCTIONAL_ASSESSMENT: 0-10

## 2024-09-09 ASSESSMENT — COLUMBIA-SUICIDE SEVERITY RATING SCALE - C-SSRS
1. IN THE PAST MONTH, HAVE YOU WISHED YOU WERE DEAD OR WISHED YOU COULD GO TO SLEEP AND NOT WAKE UP?: NO
2. HAVE YOU ACTUALLY HAD ANY THOUGHTS OF KILLING YOURSELF?: NO
6. HAVE YOU EVER DONE ANYTHING, STARTED TO DO ANYTHING, OR PREPARED TO DO ANYTHING TO END YOUR LIFE?: NO

## 2024-09-09 ASSESSMENT — COGNITIVE AND FUNCTIONAL STATUS - GENERAL: PATIENT BASELINE BEDBOUND: YES

## 2024-09-09 NOTE — H&P
History Of Present Illness  Raheem Hilton is a 64 y.o. male presenting with sepsis possible UTI from outside ER facility with suprapubic catheter malfunction, obstruction blockage and directed by urology for admission here.  Patient has history of MS with multiple recurrent UTI.  Change in mental status, lactic acidosis, leukocytosis, abdominal distention, CT abdomen pelvis with constipation fecal impaction, diffuse bladder wall thickening, moderate hydronephrosis bilaterally and nonobstructing stone in right kidney measuring 1.7 cm.  Stone and prostatic segment of urethra measuring 6 mm which is new.  Large area of decubitus ulceration overlying sacrum.  No soft tissue gas or fluid in the area.  Chest x-ray with mild pulmonary vascular congestion.  No effusion no pneumothorax.  CT head negative for acute process.  Patient was given IV fluids and broad-spectrum IV antibiotics vancomycin Zosyn in outside ED.  Chronic lower extremity weakness and discussed with RN.  Patient denies any other complaints.  Able to move arms.  Confused with metabolic encephalopathy.  No fever.  No headache.  No bleeding pus drainage from sacral ulcer.  No blurry vision.  No neck pain.  No chest pain or shortness of breath.  No back pain flank pain.  Patient unable to give proper history at this time with encephalopathy.  Currently hemodynamically stable and maintaining vital saturations.  Admitted for sepsis here.     Past Medical History  Past Medical History:   Diagnosis Date    Anemia     Bowel perforation (Multi)     Buttock wound, unspecified laterality, subsequent encounter     COPD (chronic obstructive pulmonary disease) (Multi)     Extended spectrum beta lactamase (ESBL) resistance     GERD (gastroesophageal reflux disease)     Hyperlipidemia     Hypertension     Hypo-osmolality and hyponatremia     Insomnia     Multiple sclerosis (Multi)     Neurogenic bladder     Neuromuscular dysfunction of bladder     Personal history of  transient ischemic attack (TIA), and cerebral infarction without residual deficits 08/06/2020    History of embolic stroke    Pulmonary embolism (Multi)     Sepsis with encephalopathy (Multi)     Septic shock (Multi)     Simple febrile convulsions (Multi)     Status post administration of tPA (rtPA) in a different facility within the last 24 hours prior to admission to current facility 08/06/2020    Tissue plasminogen activator (tPA) administered at other facility within 24 hours before current admission    UTI (urinary tract infection)    MS with functional paraplegia  Neurogenic bladder  Recurrent UTI  COPD  TIA  Chronic sacral decubitus ulcer  Chronic hyponatremia  Hypertension  Hypothyroidism  Seizures  Hyperlipidemia  COPD      Surgical History  Past Surgical History:   Procedure Laterality Date    MR HEAD ANGIO WO IV CONTRAST  6/29/2020    MR HEAD ANGIO WO IV CONTRAST 6/29/2020 [facility]    MR NECK ANGIO WO IV CONTRAST  6/29/2020    MR NECK ANGIO WO IV CONTRAST 6/29/2020 [facility]        Social History  He reports that he has never smoked. He has never used smokeless tobacco. No history on file for alcohol use and drug use.    Family History  No family history on file.     Allergies  Cefepime, Doxycycline, Heparin, and Sulfamethoxazole-trimethoprim    Review of Systems  All other 12 point review of systems negative except HPI  Physical Exam   General Appearance: AAO x 1  Skin: skin color pink, warm, and dry; no suspicious rashes or lesions  Eyes : PERRL, EOM's intact  ENT: mucous membranes pink and moist  Neck: normocephalic  Respiratory: lungs clear to auscultation anteriorly; no wheezing, rhonchi, or crackles.   Heart: regular rate and rhythm.   Abdomen: Nondistended, positive bowel sounds x4, soft,  nontender  Extremities: no edema   Peripheral pulses: normal x4 extremities  Neuro: Metabolic encephalopathy, confusion, chronic bilateral lower extremity weakness  Last Recorded  Vitals  Blood pressure 121/78, pulse 92, temperature 36.9 °C (98.4 °F), temperature source Temporal, resp. rate 16, weight 83.8 kg (184 lb 11.9 oz), SpO2 99%.    Relevant Results  Scheduled medications  atorvastatin, 80 mg, oral, Nightly  baclofen, 10 mg, oral, TID  carBAMazepine, 200 mg, oral, BID  gabapentin, 600 mg, oral, TID  meropenem, 500 mg, intravenous, q8h  methIMAzole, 5 mg, oral, TID  metoprolol succinate XL, 25 mg, oral, Daily  polyethylene glycol, 17 g, oral, Daily      Continuous medications  sodium chloride 0.9%, 100 mL/hr      PRN medications  PRN medications: acetaminophen **OR** acetaminophen **OR** acetaminophen, acetaminophen **OR** acetaminophen **OR** acetaminophen, HYDROcodone-acetaminophen, loperamide, ondansetron ODT **OR** ondansetron    No results found for this or any previous visit (from the past 24 hour(s)).         Assessment/Plan   Assessment & Plan  UTI (urinary tract infection)      64-year-old male with history of     MS with functional paraplegia  Neurogenic bladder  Recurrent UTI  COPD  TIA  Chronic sacral decubitus ulcer  Chronic hyponatremia  Hypertension  Hypothyroidism  Seizures  Hyperlipidemia  COPD    Presented with  Acute UTI  Acute urine retention  Suprapubic catheter malfunction  Difficult [place] with resistance  Sepsis  Lactic acidosis  Metabolic encephalopathy  Plan  Cardiopulmonary monitoring on telemetry  Normal saline at 100  cc/h, watch volume status  Meropenem IV and follow blood as well urine cultures  Supportive care, trend lactate until normal  Follow urology  Reviewed CT imaging from outside ER facility  Daily CBC BMP  Follow urine output  Follow clinically and monitor vitals  Follow mentation  Nutrition  Fall, aspiration, seizure precautions  Local wound care for sacral decubitus ulcer  Baclofen for muscle spasticity  Watch polypharmacy  Avoid sedatives if possible  On methimazole for hypothyroidism  Follow pressure  Continue home medicines  Atorvastatin 80 mg  daily, carbamazepine 200 mg p.o. daily  Neurontin for neuropathy  Metoprolol 25 mg daily for hypertension  Bowel regimen for constipation prophylaxis  Pain controlled with narcotics carefully if necessary  Neb treatments if needed  Supportive care symptomatic management, antiemetics if required  DNR  Lovenox for DVT prophylaxis  Further management as clinical course evolves        Deondre Hughes MD

## 2024-09-10 LAB
ALBUMIN SERPL BCP-MCNC: 2.9 G/DL (ref 3.4–5)
ALP SERPL-CCNC: 79 U/L (ref 33–136)
ALT SERPL W P-5'-P-CCNC: 12 U/L (ref 10–52)
ANION GAP SERPL CALC-SCNC: 10 MMOL/L (ref 10–20)
AST SERPL W P-5'-P-CCNC: 14 U/L (ref 9–39)
BILIRUB SERPL-MCNC: 0.6 MG/DL (ref 0–1.2)
BUN SERPL-MCNC: 21 MG/DL (ref 6–23)
CALCIUM SERPL-MCNC: 7.6 MG/DL (ref 8.6–10.3)
CHLORIDE SERPL-SCNC: 111 MMOL/L (ref 98–107)
CO2 SERPL-SCNC: 20 MMOL/L (ref 21–32)
CREAT SERPL-MCNC: 0.66 MG/DL (ref 0.5–1.3)
EGFRCR SERPLBLD CKD-EPI 2021: >90 ML/MIN/1.73M*2
ERYTHROCYTE [DISTWIDTH] IN BLOOD BY AUTOMATED COUNT: 13.8 % (ref 11.5–14.5)
GLUCOSE SERPL-MCNC: 84 MG/DL (ref 74–99)
HCT VFR BLD AUTO: 37.2 % (ref 41–52)
HGB BLD-MCNC: 12 G/DL (ref 13.5–17.5)
MCH RBC QN AUTO: 30.6 PG (ref 26–34)
MCHC RBC AUTO-ENTMCNC: 32.3 G/DL (ref 32–36)
MCV RBC AUTO: 95 FL (ref 80–100)
NRBC BLD-RTO: 0 /100 WBCS (ref 0–0)
PLATELET # BLD AUTO: 274 X10*3/UL (ref 150–450)
POTASSIUM SERPL-SCNC: 3.8 MMOL/L (ref 3.5–5.3)
PROT SERPL-MCNC: 5.2 G/DL (ref 6.4–8.2)
RBC # BLD AUTO: 3.92 X10*6/UL (ref 4.5–5.9)
SODIUM SERPL-SCNC: 137 MMOL/L (ref 136–145)
WBC # BLD AUTO: 20.8 X10*3/UL (ref 4.4–11.3)

## 2024-09-10 PROCEDURE — 1200000002 HC GENERAL ROOM WITH TELEMETRY DAILY

## 2024-09-10 PROCEDURE — 85027 COMPLETE CBC AUTOMATED: CPT | Performed by: HOSPITALIST

## 2024-09-10 PROCEDURE — 2500000004 HC RX 250 GENERAL PHARMACY W/ HCPCS (ALT 636 FOR OP/ED): Performed by: HOSPITALIST

## 2024-09-10 PROCEDURE — 99233 SBSQ HOSP IP/OBS HIGH 50: CPT | Performed by: HOSPITALIST

## 2024-09-10 PROCEDURE — 80053 COMPREHEN METABOLIC PANEL: CPT | Performed by: HOSPITALIST

## 2024-09-10 PROCEDURE — 36415 COLL VENOUS BLD VENIPUNCTURE: CPT | Performed by: HOSPITALIST

## 2024-09-10 PROCEDURE — 2500000001 HC RX 250 WO HCPCS SELF ADMINISTERED DRUGS (ALT 637 FOR MEDICARE OP): Performed by: HOSPITALIST

## 2024-09-10 ASSESSMENT — PAIN SCALES - GENERAL
PAINLEVEL_OUTOF10: 0 - NO PAIN

## 2024-09-10 ASSESSMENT — PAIN - FUNCTIONAL ASSESSMENT
PAIN_FUNCTIONAL_ASSESSMENT: 0-10
PAIN_FUNCTIONAL_ASSESSMENT: 0-10

## 2024-09-10 ASSESSMENT — ACTIVITIES OF DAILY LIVING (ADL): LACK_OF_TRANSPORTATION: NO

## 2024-09-10 NOTE — CARE PLAN
The patient's goals for the shift include      The clinical goals for the shift include go home    Goal not met. Unable to go home

## 2024-09-10 NOTE — PROGRESS NOTES
09/10/24 1445   Discharge Planning   Living Arrangements Alone;Other (Comment)  ([facility])   Support Systems Friends/neighbors;Other (Comment)  (SNF staff)   Assistance Needed Complete care   Type of Residence Nursing home/residential care   Do you have animals or pets at home? No   Who is requesting discharge planning? Provider   Home or Post Acute Services Other (Comment)  (SNF long term care)   Expected Discharge Disposition   (long term care facility)   Does the patient need discharge transport arranged? Yes   RoundTrip coordination needed? Yes   Has discharge transport been arranged? No   Financial Resource Strain   How hard is it for you to pay for the very basics like food, housing, medical care, and heating? Not hard   Housing Stability   In the last 12 months, was there a time when you were not able to pay the mortgage or rent on time? N   In the past 12 months, how many times have you moved where you were living? 0   At any time in the past 12 months, were you homeless or living in a shelter (including now)? N   Transportation Needs   In the past 12 months, has lack of transportation kept you from medical appointments or from getting medications? no   In the past 12 months, has lack of transportation kept you from meetings, work, or from getting things needed for daily living? No     Care Transitions: Patient reviewed in care round meeting this AM. ADOD 24-48 hours. Met with patient at bedside. Patient known to care team staff due to previous admissions. Role of TCC explained. Patient resides at [facility] as long term care. States he will return to [facility] when medically ready for discharge. No needs identified at this time. Will send a return referral with chart notes to [facility]. Care team to follow. Carlotta Zamora RN/TCC     Yes

## 2024-09-10 NOTE — CARE PLAN
Problem: Skin  Goal: Decreased wound size/increased tissue granulation at next dressing change  Flowsheets (Taken 9/10/2024 0526)  Decreased wound size/increased tissue granulation at next dressing change: Promote sleep for wound healing  Goal: Participates in plan/prevention/treatment measures  Flowsheets (Taken 9/10/2024 0526)  Participates in plan/prevention/treatment measures: Elevate heels  Goal: Prevent/manage excess moisture  Flowsheets (Taken 9/10/2024 0526)  Prevent/manage excess moisture: Cleanse incontinence/protect with barrier cream   The patient's goals for the shift include      The clinical goals for the shift include go home

## 2024-09-11 LAB
ANION GAP SERPL CALC-SCNC: 10 MMOL/L (ref 10–20)
BUN SERPL-MCNC: 16 MG/DL (ref 6–23)
CALCIUM SERPL-MCNC: 7.8 MG/DL (ref 8.6–10.3)
CHLORIDE SERPL-SCNC: 108 MMOL/L (ref 98–107)
CO2 SERPL-SCNC: 20 MMOL/L (ref 21–32)
CREAT SERPL-MCNC: 0.46 MG/DL (ref 0.5–1.3)
EGFRCR SERPLBLD CKD-EPI 2021: >90 ML/MIN/1.73M*2
ERYTHROCYTE [DISTWIDTH] IN BLOOD BY AUTOMATED COUNT: 13.9 % (ref 11.5–14.5)
GLUCOSE SERPL-MCNC: 91 MG/DL (ref 74–99)
HCT VFR BLD AUTO: 35.5 % (ref 41–52)
HGB BLD-MCNC: 11.3 G/DL (ref 13.5–17.5)
MCH RBC QN AUTO: 30.9 PG (ref 26–34)
MCHC RBC AUTO-ENTMCNC: 31.8 G/DL (ref 32–36)
MCV RBC AUTO: 97 FL (ref 80–100)
NRBC BLD-RTO: 0 /100 WBCS (ref 0–0)
PLATELET # BLD AUTO: 256 X10*3/UL (ref 150–450)
POTASSIUM SERPL-SCNC: 3.3 MMOL/L (ref 3.5–5.3)
RBC # BLD AUTO: 3.66 X10*6/UL (ref 4.5–5.9)
SODIUM SERPL-SCNC: 135 MMOL/L (ref 136–145)
WBC # BLD AUTO: 14.2 X10*3/UL (ref 4.4–11.3)

## 2024-09-11 PROCEDURE — 80048 BASIC METABOLIC PNL TOTAL CA: CPT | Performed by: HOSPITALIST

## 2024-09-11 PROCEDURE — 94664 DEMO&/EVAL PT USE INHALER: CPT

## 2024-09-11 PROCEDURE — 2500000004 HC RX 250 GENERAL PHARMACY W/ HCPCS (ALT 636 FOR OP/ED): Performed by: HOSPITALIST

## 2024-09-11 PROCEDURE — 94640 AIRWAY INHALATION TREATMENT: CPT

## 2024-09-11 PROCEDURE — 1200000002 HC GENERAL ROOM WITH TELEMETRY DAILY

## 2024-09-11 PROCEDURE — 2500000001 HC RX 250 WO HCPCS SELF ADMINISTERED DRUGS (ALT 637 FOR MEDICARE OP): Performed by: HOSPITALIST

## 2024-09-11 PROCEDURE — 2500000002 HC RX 250 W HCPCS SELF ADMINISTERED DRUGS (ALT 637 FOR MEDICARE OP, ALT 636 FOR OP/ED): Performed by: HOSPITALIST

## 2024-09-11 PROCEDURE — 9420000001 HC RT PATIENT EDUCATION 5 MIN

## 2024-09-11 PROCEDURE — 85027 COMPLETE CBC AUTOMATED: CPT | Performed by: HOSPITALIST

## 2024-09-11 PROCEDURE — 36415 COLL VENOUS BLD VENIPUNCTURE: CPT | Performed by: HOSPITALIST

## 2024-09-11 RX ORDER — POTASSIUM CHLORIDE 20 MEQ/1
40 TABLET, EXTENDED RELEASE ORAL ONCE
Status: COMPLETED | OUTPATIENT
Start: 2024-09-11 | End: 2024-09-11

## 2024-09-11 RX ORDER — ALBUTEROL SULFATE 0.83 MG/ML
2.5 SOLUTION RESPIRATORY (INHALATION) 3 TIMES DAILY PRN
Status: DISCONTINUED | OUTPATIENT
Start: 2024-09-11 | End: 2024-09-15 | Stop reason: HOSPADM

## 2024-09-11 ASSESSMENT — PAIN SCALES - GENERAL
PAINLEVEL_OUTOF10: 8
PAINLEVEL_OUTOF10: 0 - NO PAIN
PAINLEVEL_OUTOF10: 0 - NO PAIN
PAINLEVEL_OUTOF10: 4
PAINLEVEL_OUTOF10: 0 - NO PAIN

## 2024-09-11 ASSESSMENT — PAIN DESCRIPTION - LOCATION: LOCATION: LEG

## 2024-09-11 ASSESSMENT — PAIN DESCRIPTION - ORIENTATION: ORIENTATION: LEFT;RIGHT

## 2024-09-11 ASSESSMENT — COGNITIVE AND FUNCTIONAL STATUS - GENERAL
STANDING UP FROM CHAIR USING ARMS: TOTAL
WALKING IN HOSPITAL ROOM: TOTAL
TURNING FROM BACK TO SIDE WHILE IN FLAT BAD: A LOT
MOVING FROM LYING ON BACK TO SITTING ON SIDE OF FLAT BED WITH BEDRAILS: A LITTLE
MOVING TO AND FROM BED TO CHAIR: TOTAL
MOBILITY SCORE: 9
CLIMB 3 TO 5 STEPS WITH RAILING: TOTAL

## 2024-09-11 ASSESSMENT — PAIN - FUNCTIONAL ASSESSMENT
PAIN_FUNCTIONAL_ASSESSMENT: 0-10

## 2024-09-11 NOTE — CARE PLAN
Problem: Nutrition  Goal: Adequate PO fluid intake  Outcome: Met     Problem: Skin  Goal: Decreased wound size/increased tissue granulation at next dressing change  Flowsheets (Taken 9/11/2024 0512)  Decreased wound size/increased tissue granulation at next dressing change: Promote sleep for wound healing  Goal: Promote/optimize nutrition  Flowsheets (Taken 9/11/2024 0512)  Promote/optimize nutrition: Consume > 50% meals/supplements  Goal: Promote skin healing  Flowsheets (Taken 9/11/2024 0512)  Promote skin healing: Turn/reposition every 2 hours/use positioning/transfer devices   The patient's goals for the shift include      The clinical goals for the shift include decrease WBC. improve oritentation  Goal met

## 2024-09-11 NOTE — PROGRESS NOTES
"Raheem Hilton is a 64 y.o. male on day 1 of admission presenting with UTI (urinary tract infection).    Subjective      Patient seems to be more awake  No bleeding  Denies fever chills  No significant pain      Objective     Physical Exam  General Appearance: AAO x 2  Skin: skin color pink, warm, and dry; no suspicious rashes or lesions  Eyes : PERRL, EOM's intact  ENT: mucous membranes pink and moist  Neck: normocephalic  Respiratory: lungs clear to auscultation anteriorly; no wheezing, rhonchi, or crackles.   Heart: regular rate and rhythm.   Abdomen: Nondistended, positive bowel sounds x4, soft,  nontender  Extremities: no edema   Peripheral pulses: normal x4 extremities  Neuro:  chronic bilateral lower extremity weakness  Last Recorded Vitals  Blood pressure 168/80, pulse 78, temperature 37.5 °C (99.5 °F), temperature source Temporal, resp. rate 16, height 1.676 m (5' 5.98\"), weight 80 kg (176 lb 5.9 oz), SpO2 94%.  Intake/Output last 3 Shifts:  I/O last 3 completed shifts:  In: 2575.7 (32.2 mL/kg) [P.O.:340; I.V.:2035.7 (25.4 mL/kg); IV Piggyback:200]  Out: 4800 (60 mL/kg) [Urine:4800 (1.7 mL/kg/hr)]  Weight: 80 kg     Relevant Results    Scheduled medications  atorvastatin, 80 mg, oral, Nightly  baclofen, 10 mg, oral, TID  carBAMazepine, 200 mg, oral, BID  enoxaparin, 40 mg, subcutaneous, Daily  gabapentin, 600 mg, oral, TID  meropenem, 1 g, intravenous, q8h  methIMAzole, 5 mg, oral, BID  metoprolol succinate XL, 25 mg, oral, Daily  polyethylene glycol, 17 g, oral, Daily      Continuous medications  sodium chloride 0.9%, 100 mL/hr, Last Rate: 100 mL/hr (09/10/24 2038)      PRN medications  PRN medications: acetaminophen **OR** acetaminophen **OR** acetaminophen, acetaminophen **OR** acetaminophen **OR** acetaminophen, HYDROcodone-acetaminophen, loperamide, ondansetron ODT **OR** ondansetron    Results for orders placed or performed during the hospital encounter of 09/09/24 (from the past 24 hour(s))   Lactate "   Result Value Ref Range    Lactate 1.0 0.4 - 2.0 mmol/L   Blood Culture    Specimen: Peripheral Venipuncture; Blood culture   Result Value Ref Range    Blood Culture Loaded on Instrument - Culture in progress    CBC   Result Value Ref Range    WBC 20.8 (H) 4.4 - 11.3 x10*3/uL    nRBC 0.0 0.0 - 0.0 /100 WBCs    RBC 3.92 (L) 4.50 - 5.90 x10*6/uL    Hemoglobin 12.0 (L) 13.5 - 17.5 g/dL    Hematocrit 37.2 (L) 41.0 - 52.0 %    MCV 95 80 - 100 fL    MCH 30.6 26.0 - 34.0 pg    MCHC 32.3 32.0 - 36.0 g/dL    RDW 13.8 11.5 - 14.5 %    Platelets 274 150 - 450 x10*3/uL   Comprehensive metabolic panel   Result Value Ref Range    Glucose 84 74 - 99 mg/dL    Sodium 137 136 - 145 mmol/L    Potassium 3.8 3.5 - 5.3 mmol/L    Chloride 111 (H) 98 - 107 mmol/L    Bicarbonate 20 (L) 21 - 32 mmol/L    Anion Gap 10 10 - 20 mmol/L    Urea Nitrogen 21 6 - 23 mg/dL    Creatinine 0.66 0.50 - 1.30 mg/dL    eGFR >90 >60 mL/min/1.73m*2    Calcium 7.6 (L) 8.6 - 10.3 mg/dL    Albumin 2.9 (L) 3.4 - 5.0 g/dL    Alkaline Phosphatase 79 33 - 136 U/L    Total Protein 5.2 (L) 6.4 - 8.2 g/dL    AST 14 9 - 39 U/L    Bilirubin, Total 0.6 0.0 - 1.2 mg/dL    ALT 12 10 - 52 U/L       CT head wo IV contrast    Result Date: 9/9/2024  EXAMINATION: CT HEAD OR BRAIN WITHOUT CONTRAST HISTORY: ORDERING SYSTEM PROVIDED HISTORY:  ams,  TECHNOLOGIST PROVIDED HISTORY:  Illness/Other Reason for exam: [facility] otherwise Encounter Type: Initial Additional signs and symptoms: na ORDERING SYSTEM PROVIDED DIAGNOSIS CODES: N39.0 Acute UTI G93.40 Encephalopathy A41.9 Sepsis, due to unspecified organism, unspecified whether acute organ dysfunction present (HCC) COMPARISON: None. TECHNIQUE: CT examination of the head without IV contrast. Dose reduction techniques were achieved by using automated exposure control and/or adjustment of mA and/or kV according to patient size and/or use of iterative reconstruction technique. FINDINGS: Mild mucosal thickening  in the ethmoid air cells.  Middle ear cavities and mastoid air cells clear.  Skull base intact.  No skull lesion.  Nasopharynx normal.   spaces normal.  Orbital contents unremarkable. Moderate brain atrophy.  No hydrocephalus.  No extraaxial fluid collection.  No mass effect.  No shift of midline.  Gray matter and white matter differentiation intact.  No acute intracranial hemorrhage.  No masses.    1. No acute findings.  No intracranial hemorrhage.  No masses. 2. Brain atrophy.  No hydrocephalus. [facility] Workstation ID:   [id number]    CT abdomen pelvis w IV contrast    Result Date: 9/9/2024  EXAMINATION: CT ABDOMEN PELVIS WITH IV CONTRAST ONLY HISTORY: ORDERING SYSTEM PROVIDED HISTORY:  Abdominal distention,  TECHNOLOGIST PROVIDED HISTORY:  Illness/Other Reason for exam: AMS per [facility] abd distention upon arrival to ED, abd lab work, superpubic catheter in place Encounter Type: Initial Additional signs and symptoms: na ORDERING SYSTEM PROVIDED DIAGNOSIS CODES: N39.0 Acute UTI G93.40 Encephalopathy A41.9 Sepsis, due to unspecified organism, unspecified whether acute organ dysfunction present (HCC) COMPARISON: CT abdomen and pelvis, 07/27/2022. TECHNIQUE: Dose reduction techniques were achieved by using automated exposure control and/or adjustment of mA and/or kV according to patient size and/or use of iterative reconstruction technique. Postcontrast axial CT images obtained through the abdomen and pelvis.  Reconstructions obtained in the sagittal and coronal planes. CONTRAST: IOPAMIDOL 370 MG IODINE/ML (76 %) INTRAVENOUS SOLUTION  -  75 mL, FINDINGS: Lung bases clear.  No pleural effusion.  No pericardial effusion. Liver unremarkable.  Gallbladder normal.  No abnormal bile duct dilatation.  Pancreas normal.  Spleen is absent.  Adrenal glands normal.  There is a large nonobstructing stone posteriorly in the right kidney measuring 1.7 cm.  This is new from the prior.  No kidney stones on the left.  There is  moderate hydronephrosis bilaterally.  Both ureters are abnormally dilated.  No stones in the ureters. No gastric wall thickening.  Duodenum appears normal.  There is some fluid mildly distending the small bowel without small bowel obstruction.  Normal appendix.  Large amount of stool in the ascending colon.  Large amount of gas and stool throughout the transverse colon.  There is a large amount of stool in the descending colon.  There has been a prior sigmoid colon resection and anastomosis.  There is a large amount of stool distending the rectum up to approximately 8.5 cm in diameter. Calcification of the abdominal aorta without aneurysmal enlargement.  Inferior vena cava normal in size.  No free air.  No lymphadenopathy.  No ascites. In the pelvis, there is a suprapubic catheter extending into the bladder.  There is diffuse bladder wall thickening.  The bladder is distended with urine.  The prostatic urethra is noted to be distended.  There is a stone in the prostatic segment of the urethra measuring 6 mm in diameter.  This is new.  Rectum is significantly distended with large amount of stool.  No free fluid in the pelvis. There is a large decubitus ulcer overlying the sacrum.  This extends down to the bone.  There is significant erosion of the coccyx and lower sacrum.  These findings are similar to the prior. There are a few chronic compression fractures in the lower thoracic spine and lumbar spine which are stable.    1. Large amount of stool diffusely throughout the colon and rectum suggesting constipation with possible fecal impaction in the rectum. 2. The patient is a suprapubic bladder catheter.  However, the bladder is significantly distended with urine.  There is diffuse bladder wall thickening.  The distended urinary bladder results in moderate hydronephrosis bilaterally. 3. Nonobstructing stone in the right kidney measures 1.7 cm.  There is a stone in the prostatic segment of the urethra measuring 6 mm  which is new. 4. Large area of decubitus ulceration overlying the sacrum.  There is erosion of the lower sacrum and coccyx.  These findings are also seen on the comparison study and do not appear significantly changed.  No soft tissue gas or fluid collection in this area. [facility] Workstation ID:   [id number]    XR chest 1 view    Result Date: 9/9/2024  EXAMINATION: XR CHEST PA/AP HISTORY: ams COMPARISON: 02/07/2024    FINDINGS/ 1. Mild pulmonary vascular congestion. 2. No pneumothorax.  No pleural effusion. 3. Heart size and mediastinal contours are normal. 4. No acute osseous abnormality. Elevation of the right hemidiaphragm. Workstation ID:   [id number]     All data reviewed by me independently         Assessment/Plan   Assessment & Plan  UTI (urinary tract infection)      64-year-old male with history of  MS multiple sclerosis with functional paraplegia  Neurogenic bladder  Recurrent UTI  COPD  TIA  Chronic sacral decubitus ulcer  Chronic hyponatremia  Hypertension  Hypothyroidism  Seizures  Hyperlipidemia/COPD  Presented with  Acute UTI  Acute urinary retention  Suprapubic catheter malfunction  Difficult [place] with resistance  Sepsis  Lactic acidosis  Metabolic encephalopathy  Plan  Seems more awake  Continue cardiopulmonary monitoring  Normal saline at 100 cc/h  Watch volume status  Monitor urine output and daily BMP  Daily CBC  Meropenem IV and follow blood as well urine cultures  Follow urology  Supportive care  Lactate better  Nutrition  Fall, aspiration, seizure precautions  Local wound care  Baclofen for muscle spasticity  Watch polypharmacy  Avoid sedatives  On methimazole for hyper thyroidism  To follow pressure  Continue home medicines  Atorvastatin 80 mg daily,  Carbamazepine 200 mg daily  Neurontin for neuropathy  On metoprolol 25 mg daily for hypertension  Next treatments  Supportive care  Antiemetics if required  DNR  Lovenox for DVT prophylaxis  Continue current medicines                       Deondre MARIE  MD Saul

## 2024-09-11 NOTE — PROGRESS NOTES
Pt reviewed during Care Rounds today and he is not ready for discharge; ADOD is 48-72 hours for IV atb's.  The plan will be for pt to return to [facility] where he will return for long-term care. Updates attached in [facility]. Care Transitions will continue to follow.       CEDRIC Jain

## 2024-09-11 NOTE — CONSULTS
"Nutrition Initial Assessment:   Nutrition Assessment    Reason for Assessment: Admission nursing screening    Patient is a 64 y.o. male presenting with sepsis and UTI. Consulted on pt for wt loss without trying, pt had wt gain over the past month. No wt loss noted in chart. Spoke with pt who seemed sleepy stated he has lost a few lbs. No complaints of N/V/D/C at this time. PO intakes vary-rec ensure plus HP 1x daily providing 350 kcals and 20g of protein.       Nutrition History:  Energy Intake: Fair 50-75 %, Poor < 50 %  Food and Nutrient History:  (Pt admits to poor PO intake over the past few days but appetite is coming back. Pt ate 75% today at breakfast. Other intakes below 50% of meals.)  Food Allergies/Intolerances:  None  GI Symptoms: None  Oral Problems: None       Anthropometrics:  Height: 167.6 cm (5' 5.98\")   Weight: 80 kg (176 lb 5.9 oz)   BMI (Calculated): 28.48    Weight Change %:  Weight History / % Weight Change:  (wt gain of 2.3% in one month. Not significant.)  Significant Weight Loss: No    Nutrition Focused Physical Exam Findings:    Subcutaneous Fat Loss:   Orbital Fat Pads: Well nourished (slightly bulging fat pads)  Buccal Fat Pads: Well nourished (full, rounded cheeks)  Muscle Wasting:  Temporalis: Well nourished (well-defined muscle)  Pectoralis (Clavicular Region): Well nourished (clavicle not visible)  Edema:  Edema: none  Physical Findings:  Hair: Negative  Eyes: Negative  Mouth: Negative  Skin: Negative (Skin noting large ulceration to sacrum)    Nutrition Significant Labs:  CBC Trend:   Results from last 7 days   Lab Units 09/11/24  0415 09/10/24  0411   WBC AUTO x10*3/uL 14.2* 20.8*   RBC AUTO x10*6/uL 3.66* 3.92*   HEMOGLOBIN g/dL 11.3* 12.0*   HEMATOCRIT % 35.5* 37.2*   MCV fL 97 95   PLATELETS AUTO x10*3/uL 256 274    , BMP Trend:   Results from last 7 days   Lab Units 09/11/24 0415 09/10/24  0411   GLUCOSE mg/dL 91 84   CALCIUM mg/dL 7.8* 7.6*   SODIUM mmol/L 135* 137   POTASSIUM " mmol/L 3.3* 3.8   CO2 mmol/L 20* 20*   CHLORIDE mmol/L 108* 111*   BUN mg/dL 16 21   CREATININE mg/dL 0.46* 0.66    , A1C:  Lab Results   Component Value Date    HGBA1C 6.2 (H) 12/28/2021       Nutrition Specific Medications:  atorvastatin, 80 mg, oral, Nightly  baclofen, 10 mg, oral, TID  carBAMazepine, 200 mg, oral, BID  enoxaparin, 40 mg, subcutaneous, Daily  gabapentin, 600 mg, oral, TID  meropenem, 1 g, intravenous, q8h  methIMAzole, 5 mg, oral, BID  metoprolol succinate XL, 25 mg, oral, Daily  polyethylene glycol, 17 g, oral, Daily         I/O:   Last BM Date: 09/11/24; Stool Appearance: Soft (09/11/24 0200)    Dietary Orders (From admission, onward)       Start     Ordered    09/09/24 1831  Adult diet Cardiac; 70 gm fat; 2 - 3 grams Sodium  Diet effective now        Question Answer Comment   Diet type Cardiac    Fat restriction: 70 gm fat    Sodium restriction: 2 - 3 grams Sodium        09/09/24 1831 09/09/24 1748  May Participate in Room Service  Once        Question:  .  Answer:  Yes    09/09/24 1750                     Estimated Needs:   Total Energy Estimated Needs (kCal): 2370 kCal  Method for Estimating Needs: 30-32kcals/kg 7942-8900  Total Protein Estimated Needs (g): 103 g  Method for Estimating Needs: 1.2-1.5g/kg   Total Fluid Estimated Needs (mL): 2370 mL  Method for Estimating Needs: 1 ml/kcal        Nutrition Diagnosis   Malnutrition Diagnosis  Patient has Malnutrition Diagnosis: No    Nutrition Diagnosis  Patient has Nutrition Diagnosis: Yes  Diagnosis Status (1): New  Nutrition Diagnosis 1: Increased nutrient needs  Related to (1): healing process  As Evidenced by (1): large pressure injury on sacrum       Nutrition Interventions/Recommendations         Nutrition Prescription:  Individualized Nutrition Prescription Provided for :  (Individual nutrtition prescription providing 2370 kcals and 103g to be provided with current diet order. (Cardiac diet, 70g fat, 2-3g Na))        Nutrition  Interventions:   Interventions: Meals and snacks, Medical food supplement  Meals and Snacks: Mineral-modified diet, Fat-modified diet  Medical Food Supplement: Commercial beverage  Goal:  (ensure plus HP 1x daily, providing 350 kcals and 20g of protein.)        Nutrition Monitoring and Evaluation   Food/Nutrient Related History Monitoring  Monitoring and Evaluation Plan: Energy intake  Energy Intake: Estimated energy intake  Criteria: Continue with PO intkaes, goal of >50% of all meals.  Additional Plans: Ensure plus HP 1x daily to provide an additional 350 kcals and 20g of protein.      Time Spent (min): 60 minutes

## 2024-09-12 LAB
ANION GAP SERPL CALC-SCNC: 10 MMOL/L (ref 10–20)
BUN SERPL-MCNC: 10 MG/DL (ref 6–23)
CALCIUM SERPL-MCNC: 8.4 MG/DL (ref 8.6–10.3)
CHLORIDE SERPL-SCNC: 104 MMOL/L (ref 98–107)
CO2 SERPL-SCNC: 24 MMOL/L (ref 21–32)
CREAT SERPL-MCNC: 0.43 MG/DL (ref 0.5–1.3)
EGFRCR SERPLBLD CKD-EPI 2021: >90 ML/MIN/1.73M*2
ERYTHROCYTE [DISTWIDTH] IN BLOOD BY AUTOMATED COUNT: 13.7 % (ref 11.5–14.5)
GLUCOSE SERPL-MCNC: 96 MG/DL (ref 74–99)
HCT VFR BLD AUTO: 37.4 % (ref 41–52)
HGB BLD-MCNC: 11.9 G/DL (ref 13.5–17.5)
HOLD SPECIMEN: NORMAL
MCH RBC QN AUTO: 30.2 PG (ref 26–34)
MCHC RBC AUTO-ENTMCNC: 31.8 G/DL (ref 32–36)
MCV RBC AUTO: 95 FL (ref 80–100)
NRBC BLD-RTO: 0 /100 WBCS (ref 0–0)
PLATELET # BLD AUTO: 242 X10*3/UL (ref 150–450)
POTASSIUM SERPL-SCNC: 3.5 MMOL/L (ref 3.5–5.3)
RBC # BLD AUTO: 3.94 X10*6/UL (ref 4.5–5.9)
SODIUM SERPL-SCNC: 134 MMOL/L (ref 136–145)
WBC # BLD AUTO: 10.4 X10*3/UL (ref 4.4–11.3)

## 2024-09-12 PROCEDURE — 36415 COLL VENOUS BLD VENIPUNCTURE: CPT | Performed by: HOSPITALIST

## 2024-09-12 PROCEDURE — 2500000001 HC RX 250 WO HCPCS SELF ADMINISTERED DRUGS (ALT 637 FOR MEDICARE OP): Performed by: HOSPITALIST

## 2024-09-12 PROCEDURE — 85027 COMPLETE CBC AUTOMATED: CPT | Performed by: HOSPITALIST

## 2024-09-12 PROCEDURE — 80048 BASIC METABOLIC PNL TOTAL CA: CPT | Performed by: HOSPITALIST

## 2024-09-12 PROCEDURE — 99232 SBSQ HOSP IP/OBS MODERATE 35: CPT | Performed by: HOSPITALIST

## 2024-09-12 PROCEDURE — 1200000002 HC GENERAL ROOM WITH TELEMETRY DAILY

## 2024-09-12 PROCEDURE — 94640 AIRWAY INHALATION TREATMENT: CPT

## 2024-09-12 PROCEDURE — 2500000004 HC RX 250 GENERAL PHARMACY W/ HCPCS (ALT 636 FOR OP/ED): Performed by: HOSPITALIST

## 2024-09-12 PROCEDURE — 2500000002 HC RX 250 W HCPCS SELF ADMINISTERED DRUGS (ALT 637 FOR MEDICARE OP, ALT 636 FOR OP/ED): Performed by: HOSPITALIST

## 2024-09-12 RX ORDER — CEFTRIAXONE 2 G/50ML
2 INJECTION, SOLUTION INTRAVENOUS EVERY 24 HOURS
Status: DISCONTINUED | OUTPATIENT
Start: 2024-09-12 | End: 2024-09-15 | Stop reason: HOSPADM

## 2024-09-12 ASSESSMENT — COGNITIVE AND FUNCTIONAL STATUS - GENERAL
TOILETING: TOTAL
DRESSING REGULAR LOWER BODY CLOTHING: A LOT
CLIMB 3 TO 5 STEPS WITH RAILING: TOTAL
WALKING IN HOSPITAL ROOM: TOTAL
HELP NEEDED FOR BATHING: A LOT
DRESSING REGULAR UPPER BODY CLOTHING: A LOT
EATING MEALS: A LITTLE
MOVING FROM LYING ON BACK TO SITTING ON SIDE OF FLAT BED WITH BEDRAILS: A LITTLE
PERSONAL GROOMING: A LITTLE
TURNING FROM BACK TO SIDE WHILE IN FLAT BAD: A LOT
MOBILITY SCORE: 9
MOVING TO AND FROM BED TO CHAIR: TOTAL
DAILY ACTIVITIY SCORE: 13
STANDING UP FROM CHAIR USING ARMS: TOTAL

## 2024-09-12 ASSESSMENT — PAIN SCALES - GENERAL
PAINLEVEL_OUTOF10: 0 - NO PAIN
PAINLEVEL_OUTOF10: 0 - NO PAIN

## 2024-09-12 ASSESSMENT — PAIN - FUNCTIONAL ASSESSMENT: PAIN_FUNCTIONAL_ASSESSMENT: 0-10

## 2024-09-12 NOTE — PROGRESS NOTES
Care Transitions: Patient reviewed in morning care round meeting and is not medically ready for discharge today. Discharge plan remains to return to [facility] where he received long term care. No needs anticipated at this time. Updates sent to [facility] via [employer]. Care team to follow. Carlotta Zamora RN/TCC

## 2024-09-12 NOTE — PROGRESS NOTES
"Raheem Hilton is a 64 y.o. male on day 2 of admission presenting with UTI (urinary tract infection).    Subjective   Patient more awake alert  Denies any complaints  Denies any nausea vomiting fever chills  Denies any bleeding from anywhere  Denies any discomfort at rest         Objective     Physical Exam  General Appearance: AAO x 2-3  Skin: skin color pink, warm, and dry; no suspicious rashes or lesions  Eyes : PERRL, EOM's intact  ENT: mucous membranes pink and moist  Neck: normocephalic  Respiratory: lungs clear to auscultation anteriorly; no wheezing, rhonchi, or crackles.   Heart: regular rate and rhythm.   Abdomen: Nondistended, positive bowel sounds x4, soft,  nontender  Extremities: no edema   Peripheral pulses: normal x4 extremities  Neuro:  chronic bilateral lower extremity weakness  Last Recorded Vitals  Blood pressure (!) 163/93, pulse 66, temperature 36.5 °C (97.7 °F), temperature source Temporal, resp. rate 17, height 1.676 m (5' 5.98\"), weight 80 kg (176 lb 5.9 oz), SpO2 95%.  Intake/Output last 3 Shifts:  I/O last 3 completed shifts:  In: 4643.3 (58 mL/kg) [P.O.:960; I.V.:3383.3 (42.3 mL/kg); IV Piggyback:300]  Out: 4475 (55.9 mL/kg) [Urine:4475 (1.6 mL/kg/hr)]  Weight: 80 kg     Relevant Results  Scheduled medications  atorvastatin, 80 mg, oral, Nightly  baclofen, 10 mg, oral, TID  carBAMazepine, 200 mg, oral, BID  enoxaparin, 40 mg, subcutaneous, Daily  gabapentin, 600 mg, oral, TID  meropenem, 1 g, intravenous, q8h  methIMAzole, 5 mg, oral, BID  metoprolol succinate XL, 25 mg, oral, Daily  polyethylene glycol, 17 g, oral, Daily      Continuous medications     PRN medications  PRN medications: acetaminophen **OR** acetaminophen **OR** acetaminophen, acetaminophen **OR** acetaminophen **OR** acetaminophen, albuterol, HYDROcodone-acetaminophen, loperamide, ondansetron ODT **OR** ondansetron    Results for orders placed or performed during the hospital encounter of 09/09/24 (from the past 24 " hour(s))   CBC   Result Value Ref Range    WBC 14.2 (H) 4.4 - 11.3 x10*3/uL    nRBC 0.0 0.0 - 0.0 /100 WBCs    RBC 3.66 (L) 4.50 - 5.90 x10*6/uL    Hemoglobin 11.3 (L) 13.5 - 17.5 g/dL    Hematocrit 35.5 (L) 41.0 - 52.0 %    MCV 97 80 - 100 fL    MCH 30.9 26.0 - 34.0 pg    MCHC 31.8 (L) 32.0 - 36.0 g/dL    RDW 13.9 11.5 - 14.5 %    Platelets 256 150 - 450 x10*3/uL   Basic metabolic panel   Result Value Ref Range    Glucose 91 74 - 99 mg/dL    Sodium 135 (L) 136 - 145 mmol/L    Potassium 3.3 (L) 3.5 - 5.3 mmol/L    Chloride 108 (H) 98 - 107 mmol/L    Bicarbonate 20 (L) 21 - 32 mmol/L    Anion Gap 10 10 - 20 mmol/L    Urea Nitrogen 16 6 - 23 mg/dL    Creatinine 0.46 (L) 0.50 - 1.30 mg/dL    eGFR >90 >60 mL/min/1.73m*2    Calcium 7.8 (L) 8.6 - 10.3 mg/dL                      Assessment/Plan   Assessment & Plan  UTI (urinary tract infection)           64-year-old male with history of  Multiple sclerosis with functional paraplegia  Neurogenic bladder  Recurrent UTI  COPD  TIA  Chronic sacral decubitus ulcer  Chronic hyponatremia  Hypertension  Hypothyroidism  Seizures  Hyperlipidemia  COPD  Presented with  Acute UTI  Acute urinary retention  Suprapubic catheter malfunction  Difficult [place] with resistance  Sepsis  Lactic acidosis  Metabolic encephalopathy  Plan  Continue cardiopulmonary monitoring  Normal saline at 100 cc/h, can stop fluids now that patient more awake alert and can take enteral nutrition  Daily CBC BMP, urine output  On meropenem IV and follow urine cultures  Blood culture no growth for 1 day  Follow urology outpatient  Supportive care  Lactate better  Nutrition  Fall, aspiration, seizure precautions  Local wound care  Baclofen for muscle spasticity  Avoid sedatives  On methimazole for hyperthyroidism  To follow pressure  Continue home medicines  On atorvastatin 80 mg daily  Carbamazepine 200 mg daily  Neurontin for neuropathy  On metoprolol 25 mg daily for hypertension  Neb treatments  Supportive  care  Antiemetics if required  DNR  Lovenox for DVT prophylaxis                                        Deondre Hughes MD

## 2024-09-12 NOTE — PROGRESS NOTES
"Raheem Hilton is a 64 y.o. male on day 3 of admission presenting with UTI (urinary tract infection).    Subjective   Patient more awake and seems to be in pleasant mood  No nausea vomiting diarrhea or bleeding  No fever chills  No shortness of breath or cough  Denies muscle spasms  Chronic urine retention with suprapubic cath       Objective     Physical Exam  General Appearance: AAO x 2-3  Skin: skin color pink, warm, and dry; no suspicious rashes or lesions  Eyes : PERRL, EOM's intact  ENT: mucous membranes pink and moist  Neck: normocephalic  Respiratory: lungs clear to auscultation anteriorly; no wheezing, rhonchi, or crackles.   Heart: regular rate and rhythm.   Abdomen: Nondistended, positive bowel sounds x4, soft,  nontender  Extremities: no edema   Peripheral pulses: normal x4 extremities  Neuro:  chronic bilateral lower extremity weakness  Last Recorded Vitals  Blood pressure 173/84, pulse 63, temperature 36.6 °C (97.9 °F), resp. rate 18, height 1.676 m (5' 5.98\"), weight 80 kg (176 lb 5.9 oz), SpO2 95%.  Intake/Output last 3 Shifts:  I/O last 3 completed shifts:  In: 3333.3 (41.7 mL/kg) [P.O.:800; I.V.:2233.3 (27.9 mL/kg); IV Piggyback:300]  Out: 4425 (55.3 mL/kg) [Urine:4425 (1.5 mL/kg/hr)]  Weight: 80 kg     Relevant Results  Scheduled medications  atorvastatin, 80 mg, oral, Nightly  baclofen, 10 mg, oral, TID  carBAMazepine, 200 mg, oral, BID  cefTRIAXone, 2 g, intravenous, q24h  enoxaparin, 40 mg, subcutaneous, Daily  gabapentin, 600 mg, oral, TID  methIMAzole, 5 mg, oral, BID  metoprolol succinate XL, 25 mg, oral, Daily  polyethylene glycol, 17 g, oral, Daily      Continuous medications     PRN medications  PRN medications: acetaminophen **OR** acetaminophen **OR** acetaminophen, acetaminophen **OR** acetaminophen **OR** acetaminophen, albuterol, HYDROcodone-acetaminophen, loperamide, ondansetron ODT **OR** ondansetron    Results for orders placed or performed during the hospital encounter of " 09/09/24 (from the past 24 hour(s))   CBC   Result Value Ref Range    WBC 10.4 4.4 - 11.3 x10*3/uL    nRBC 0.0 0.0 - 0.0 /100 WBCs    RBC 3.94 (L) 4.50 - 5.90 x10*6/uL    Hemoglobin 11.9 (L) 13.5 - 17.5 g/dL    Hematocrit 37.4 (L) 41.0 - 52.0 %    MCV 95 80 - 100 fL    MCH 30.2 26.0 - 34.0 pg    MCHC 31.8 (L) 32.0 - 36.0 g/dL    RDW 13.7 11.5 - 14.5 %    Platelets 242 150 - 450 x10*3/uL   Basic metabolic panel   Result Value Ref Range    Glucose 96 74 - 99 mg/dL    Sodium 134 (L) 136 - 145 mmol/L    Potassium 3.5 3.5 - 5.3 mmol/L    Chloride 104 98 - 107 mmol/L    Bicarbonate 24 21 - 32 mmol/L    Anion Gap 10 10 - 20 mmol/L    Urea Nitrogen 10 6 - 23 mg/dL    Creatinine 0.43 (L) 0.50 - 1.30 mg/dL    eGFR >90 >60 mL/min/1.73m*2    Calcium 8.4 (L) 8.6 - 10.3 mg/dL   SST TOP   Result Value Ref Range    Extra Tube Hold for add-ons.                  Assessment/Plan   Assessment & Plan  UTI (urinary tract infection)      64-year-old male with history of  Multiple sclerosis  Functional paraplegia  Neurogenic bladder  Recurrent UTI  COPD  TIA  Chronic sacral decubitus ulcer  Chronic hyponatremia  Hypertension  Hyper thyroidism  Seizures  Hyperlipidemia  COPD  Presented with  Acute UTI  Acute urinary retention  Suprapubic catheter malfunction,  Difficult [place] with resistance  Sepsis  Lactic acidosis  Metabolic encephalopathy  Gram-negative bacteremia likely from UTI  Plan  Continue monitoring  Patient was given normal saline at 100 cc/h and stopped afterwards since tolerating diet  Improved lactic acidosis  Mentation alert  Daily CBC BMP, urine output  On meropenem IV switched to IV ceftriaxone based on sensitivities from [facility] lab record, discussed with pharmacy  Repeating blood cultures so far negative  Nutrition  Fall, aspiration, seizure precautions  Local wound care  Methimazole for hyperthyroidism  Statins for hyperlipidemia statin carbamazepine 200 mg daily  Neurontin for neuropathy  Metoprolol 25 g daily for  hypertension as necessary  Supportive care  Antiemetics if required  DNR  Lovenox for DVT prophylaxis    Disposition when stable clinically and follow urology outpatient                      Deondre Hughes MD

## 2024-09-12 NOTE — CARE PLAN
The patient's goals for the shift include      The clinical goals for the shift include VS WNL    Over the shift, the patient did not make progress toward the following goals. Barriers to progression include ***. Recommendations to address these barriers include ***.

## 2024-09-13 LAB
ANION GAP SERPL CALC-SCNC: 10 MMOL/L (ref 10–20)
BUN SERPL-MCNC: 9 MG/DL (ref 6–23)
CALCIUM SERPL-MCNC: 8.6 MG/DL (ref 8.6–10.3)
CHLORIDE SERPL-SCNC: 102 MMOL/L (ref 98–107)
CO2 SERPL-SCNC: 24 MMOL/L (ref 21–32)
CREAT SERPL-MCNC: 0.44 MG/DL (ref 0.5–1.3)
EGFRCR SERPLBLD CKD-EPI 2021: >90 ML/MIN/1.73M*2
ERYTHROCYTE [DISTWIDTH] IN BLOOD BY AUTOMATED COUNT: 13.7 % (ref 11.5–14.5)
GLUCOSE SERPL-MCNC: 88 MG/DL (ref 74–99)
HCT VFR BLD AUTO: 40.3 % (ref 41–52)
HGB BLD-MCNC: 13.1 G/DL (ref 13.5–17.5)
MCH RBC QN AUTO: 30.5 PG (ref 26–34)
MCHC RBC AUTO-ENTMCNC: 32.5 G/DL (ref 32–36)
MCV RBC AUTO: 94 FL (ref 80–100)
NRBC BLD-RTO: 0 /100 WBCS (ref 0–0)
PLATELET # BLD AUTO: 304 X10*3/UL (ref 150–450)
POTASSIUM SERPL-SCNC: 3.3 MMOL/L (ref 3.5–5.3)
RBC # BLD AUTO: 4.3 X10*6/UL (ref 4.5–5.9)
SODIUM SERPL-SCNC: 133 MMOL/L (ref 136–145)
WBC # BLD AUTO: 8.8 X10*3/UL (ref 4.4–11.3)

## 2024-09-13 PROCEDURE — 36415 COLL VENOUS BLD VENIPUNCTURE: CPT | Performed by: HOSPITALIST

## 2024-09-13 PROCEDURE — 2500000004 HC RX 250 GENERAL PHARMACY W/ HCPCS (ALT 636 FOR OP/ED): Performed by: HOSPITALIST

## 2024-09-13 PROCEDURE — 94640 AIRWAY INHALATION TREATMENT: CPT

## 2024-09-13 PROCEDURE — 85027 COMPLETE CBC AUTOMATED: CPT | Performed by: HOSPITALIST

## 2024-09-13 PROCEDURE — 2500000001 HC RX 250 WO HCPCS SELF ADMINISTERED DRUGS (ALT 637 FOR MEDICARE OP): Performed by: HOSPITALIST

## 2024-09-13 PROCEDURE — 99232 SBSQ HOSP IP/OBS MODERATE 35: CPT | Performed by: HOSPITALIST

## 2024-09-13 PROCEDURE — 82374 ASSAY BLOOD CARBON DIOXIDE: CPT | Performed by: HOSPITALIST

## 2024-09-13 PROCEDURE — 1200000002 HC GENERAL ROOM WITH TELEMETRY DAILY

## 2024-09-13 PROCEDURE — 2500000002 HC RX 250 W HCPCS SELF ADMINISTERED DRUGS (ALT 637 FOR MEDICARE OP, ALT 636 FOR OP/ED): Performed by: HOSPITALIST

## 2024-09-13 RX ORDER — POTASSIUM CHLORIDE 20 MEQ/1
40 TABLET, EXTENDED RELEASE ORAL ONCE
Status: COMPLETED | OUTPATIENT
Start: 2024-09-13 | End: 2024-09-13

## 2024-09-13 RX ORDER — LIDOCAINE HYDROCHLORIDE 10 MG/ML
5 INJECTION, SOLUTION EPIDURAL; INFILTRATION; INTRACAUDAL; PERINEURAL ONCE
Status: DISCONTINUED | OUTPATIENT
Start: 2024-09-13 | End: 2024-09-15 | Stop reason: HOSPADM

## 2024-09-13 ASSESSMENT — PAIN DESCRIPTION - LOCATION: LOCATION: LEG

## 2024-09-13 ASSESSMENT — PAIN SCALES - GENERAL
PAINLEVEL_OUTOF10: 0 - NO PAIN
PAINLEVEL_OUTOF10: 4

## 2024-09-13 ASSESSMENT — COGNITIVE AND FUNCTIONAL STATUS - GENERAL
MOVING TO AND FROM BED TO CHAIR: TOTAL
TURNING FROM BACK TO SIDE WHILE IN FLAT BAD: A LOT
TOILETING: TOTAL
PERSONAL GROOMING: A LOT
DAILY ACTIVITIY SCORE: 10
DRESSING REGULAR LOWER BODY CLOTHING: TOTAL
WALKING IN HOSPITAL ROOM: TOTAL
MOVING FROM LYING ON BACK TO SITTING ON SIDE OF FLAT BED WITH BEDRAILS: A LOT
MOBILITY SCORE: 8
HELP NEEDED FOR BATHING: TOTAL
STANDING UP FROM CHAIR USING ARMS: TOTAL
CLIMB 3 TO 5 STEPS WITH RAILING: TOTAL
DRESSING REGULAR UPPER BODY CLOTHING: TOTAL

## 2024-09-13 ASSESSMENT — PAIN - FUNCTIONAL ASSESSMENT
PAIN_FUNCTIONAL_ASSESSMENT: 0-10
PAIN_FUNCTIONAL_ASSESSMENT: 0-10

## 2024-09-13 ASSESSMENT — PAIN DESCRIPTION - ORIENTATION: ORIENTATION: RIGHT;LEFT

## 2024-09-13 NOTE — CARE PLAN
Problem: Nutrition  Goal: Less than 5 days NPO/clear liquids  Outcome: Progressing  Goal: Consume prescribed supplement  Outcome: Progressing  Goal: Nutrition support goals are met within 48 hrs  Outcome: Progressing  Goal: Nutrition support is meeting 75% of nutrient needs  Outcome: Progressing  Goal: BG  mg/dL  Outcome: Progressing  Goal: Lab values WNL  Outcome: Progressing  Goal: Electrolytes WNL  Outcome: Progressing  Goal: Promote healing  Outcome: Progressing  Goal: Maintain stable weight  Outcome: Progressing  Goal: Reduce weight from edema/fluid  Outcome: Progressing     Problem: Discharge Planning  Goal: Discharge to home or other facility with appropriate resources  Outcome: Progressing     Problem: Chronic Conditions and Co-morbidities  Goal: Patient's chronic conditions and co-morbidity symptoms are monitored and maintained or improved  Outcome: Progressing     Problem: Skin  Goal: Decreased wound size/increased tissue granulation at next dressing change  Outcome: Progressing  Goal: Participates in plan/prevention/treatment measures  Outcome: Progressing  Flowsheets (Taken 9/13/2024 0402)  Participates in plan/prevention/treatment measures: Elevate heels  Goal: Prevent/manage excess moisture  Outcome: Progressing  Goal: Prevent/minimize sheer/friction injuries  Outcome: Progressing  Goal: Promote/optimize nutrition  Outcome: Progressing  Goal: Promote skin healing  Outcome: Progressing   The patient's goals for the shift include      The clinical goals for the shift include Patient will have adequate output this shift.

## 2024-09-13 NOTE — PROGRESS NOTES
Per medical team, patient is not yet medically appropriate for discharge today; Patient to have midline placed for the administration of IV Ceftriaxone to treat patient's bacteremia; Patient will likely require another 24 hours in the hospital. /DSC to continue to send updated notes to EUGENE via [employer] as notes available. Plan for patient is to return to [facility] for resumption of long-term care when patient is medically ready, pending Midline placement and confirmation of IV antibiotics needs. Care Transitions to follow and assist. BELLE Wilburn

## 2024-09-13 NOTE — PROGRESS NOTES
"Music Therapy Note    Raheem Hilton was referred by Marium Milner, RN.     Therapy Session  Referral Type: Referral from previous admission  Visit Type: New visit  Session Start Time: 1535  Session End Time: 1535  Intervention Delivery: In-person  Conflict of Service: None  Family Present for Session: None     Pre-assessment  Unable to Assess Reason: Outcomes not assessed  Mood/Affect: Calm, Appropriate         Treatment/Interventions  Music Therapy Interventions: Assessment    Post-assessment  Unable to Assess Reason: Did not provide expressive therapy intervention  Mood/Affect: Calm, Appropriate  Total Session Time (min): 0 minutes    Narrative  Assessment Detail: Pt found resting in bed, awake/alert, upon arrival of music therapist. Pt expressed doing \"good\" today. Pt recalled MT from previous admissions. Pt expressed no other concerns at time and declined services.  Follow-up: No plan to follow-up at this time.    Education Documentation  No documentation found.        Expressive Therapies Note  "

## 2024-09-13 NOTE — CARE PLAN
Problem: Nutrition  Goal: Less than 5 days NPO/clear liquids  Outcome: Progressing  Goal: Consume prescribed supplement  Outcome: Progressing  Goal: Nutrition support goals are met within 48 hrs  Outcome: Progressing  Goal: Nutrition support is meeting 75% of nutrient needs  Outcome: Progressing  Goal: BG  mg/dL  Outcome: Progressing  Goal: Lab values WNL  Outcome: Progressing  Goal: Electrolytes WNL  Outcome: Progressing  Goal: Promote healing  Outcome: Progressing  Goal: Maintain stable weight  Outcome: Progressing  Goal: Reduce weight from edema/fluid  Outcome: Progressing     Problem: Discharge Planning  Goal: Discharge to home or other facility with appropriate resources  Outcome: Progressing     Problem: Chronic Conditions and Co-morbidities  Goal: Patient's chronic conditions and co-morbidity symptoms are monitored and maintained or improved  Outcome: Progressing     Problem: Skin  Goal: Decreased wound size/increased tissue granulation at next dressing change  Outcome: Progressing  Flowsheets (Taken 9/13/2024 0810)  Decreased wound size/increased tissue granulation at next dressing change: Promote sleep for wound healing  Goal: Participates in plan/prevention/treatment measures  Outcome: Progressing  Flowsheets (Taken 9/13/2024 0810)  Participates in plan/prevention/treatment measures: Increase activity/out of bed for meals  Goal: Prevent/manage excess moisture  Outcome: Progressing  Flowsheets (Taken 9/13/2024 0810)  Prevent/manage excess moisture: Moisturize dry skin  Goal: Prevent/minimize sheer/friction injuries  Outcome: Progressing  Flowsheets (Taken 9/13/2024 0810)  Prevent/minimize sheer/friction injuries: Use pull sheet  Goal: Promote/optimize nutrition  Outcome: Progressing  Flowsheets (Taken 9/13/2024 0810)  Promote/optimize nutrition: Consume > 50% meals/supplements  Goal: Promote skin healing  Outcome: Progressing  Flowsheets (Taken 9/13/2024 0810)  Promote skin healing: Turn/reposition  every 2 hours/use positioning/transfer devices   The patient's goals for the shift include      The clinical goals for the shift include Patient will have adequate output this shift.    Over the shift, the patient did not make progress toward the following goals. Barriers to progression include . Recommendations to address these barriers include .

## 2024-09-13 NOTE — PROGRESS NOTES
"Raheem Hilton is a 64 y.o. male on day 4 of admission presenting with UTI (urinary tract infection).    Subjective   Patient denies any complaints  No fever chills nausea vomiting or bleeding per the patient         Objective     Physical Exam  General Appearance: AAO x 3  Skin: skin color pink, warm, and dry; no suspicious rashes or lesions  Eyes : PERRL, EOM's intact  ENT: mucous membranes pink and moist  Neck: normocephalic  Respiratory: lungs clear to auscultation anteriorly; no wheezing, rhonchi, or crackles.   Heart: regular rate and rhythm.   Abdomen: Nondistended, positive bowel sounds x4, soft,  nontender  Extremities: no edema   Peripheral pulses: normal x4 extremities  Neuro:  chronic bilateral lower extremity weakness  Last Recorded Vitals  Blood pressure (!) 154/97, pulse 101, temperature 36.6 °C (97.9 °F), temperature source Temporal, resp. rate 18, height 1.676 m (5' 5.98\"), weight 80 kg (176 lb 5.9 oz), SpO2 96%.  Intake/Output last 3 Shifts:  I/O last 3 completed shifts:  In: 320 (4 mL/kg) [P.O.:320]  Out: 4650 (58.1 mL/kg) [Urine:4650 (1.6 mL/kg/hr)]  Weight: 80 kg     Relevant Results   Scheduled medications  atorvastatin, 80 mg, oral, Nightly  baclofen, 10 mg, oral, TID  carBAMazepine, 200 mg, oral, BID  cefTRIAXone, 2 g, intravenous, q24h  enoxaparin, 40 mg, subcutaneous, Daily  gabapentin, 600 mg, oral, TID  lidocaine, 5 mL, infiltration, Once  methIMAzole, 5 mg, oral, BID  metoprolol succinate XL, 25 mg, oral, Daily  polyethylene glycol, 17 g, oral, Daily      Continuous medications     PRN medications  PRN medications: acetaminophen **OR** acetaminophen **OR** acetaminophen, acetaminophen **OR** acetaminophen **OR** acetaminophen, albuterol, HYDROcodone-acetaminophen, loperamide, ondansetron ODT **OR** ondansetron    Results for orders placed or performed during the hospital encounter of 09/09/24 (from the past 24 hour(s))   CBC   Result Value Ref Range    WBC 8.8 4.4 - 11.3 x10*3/uL    nRBC " 0.0 0.0 - 0.0 /100 WBCs    RBC 4.30 (L) 4.50 - 5.90 x10*6/uL    Hemoglobin 13.1 (L) 13.5 - 17.5 g/dL    Hematocrit 40.3 (L) 41.0 - 52.0 %    MCV 94 80 - 100 fL    MCH 30.5 26.0 - 34.0 pg    MCHC 32.5 32.0 - 36.0 g/dL    RDW 13.7 11.5 - 14.5 %    Platelets 304 150 - 450 x10*3/uL   Basic metabolic panel   Result Value Ref Range    Glucose 88 74 - 99 mg/dL    Sodium 133 (L) 136 - 145 mmol/L    Potassium 3.3 (L) 3.5 - 5.3 mmol/L    Chloride 102 98 - 107 mmol/L    Bicarbonate 24 21 - 32 mmol/L    Anion Gap 10 10 - 20 mmol/L    Urea Nitrogen 9 6 - 23 mg/dL    Creatinine 0.44 (L) 0.50 - 1.30 mg/dL    eGFR >90 >60 mL/min/1.73m*2    Calcium 8.6 8.6 - 10.3 mg/dL             Assessment/Plan   Assessment & Plan  UTI (urinary tract infection)           64-year-old male with history of  Multiple sclerosis  Functional paraplegia  Neurogenic bladder  Recurrent UTI  COPD  TIA  Chronic sacral decubitus ulcer  Chronic hyponatremia  Hypertension  Hyper thyroidism  Seizures  Hyperlipidemia  Presented with  Acute UTI  Acute urinary retention  Suprapubic catheter site, slight bleeding present on admission  Sepsis  Lactic acidosis  Metabolic encephalopathy  Gram-negative bacteremia likely from UTI, from outside ER facility  Plan  Patient was given normal saline at 100 cc/h initially, stopped afterwards since tolerating diet  Improved lactic acidosis  Mentation alert  Daily CBC BMP, monitor urine output  Initially on meropenem IV now switched to ceftriaxone IV based on sensitivities from [facility] lab record, discussed with pharmacy  May need midline, although patient is requesting p.o. alternative.  Explained to him that for treatment of current bacteremia, best to use IV route but can check with pharmacy if patient adamant  Try to convince him, did not make any decision yet but hopefully can follow my advice for continuing IV antibiotics to complete the course for 14 days total, from admission  Repeat blood cultures negative so  far  Nutrition  Fall, aspiration, seizure precautions  Local wound care  Methimazole for hyperthyroidism  Statins  Continue carbamazepine 200 mg daily  Neurontin for neuropathy  On metoprolol 25 mg daily for hypertension  Supportive care  Antiemetics if required  DNR  Lovenox for DVT prophylaxis                                        Deondre Hughes MD

## 2024-09-14 LAB
ANION GAP SERPL CALC-SCNC: 11 MMOL/L (ref 10–20)
BACTERIA BLD CULT: NORMAL
BUN SERPL-MCNC: 13 MG/DL (ref 6–23)
CALCIUM SERPL-MCNC: 8.5 MG/DL (ref 8.6–10.3)
CHLORIDE SERPL-SCNC: 103 MMOL/L (ref 98–107)
CO2 SERPL-SCNC: 24 MMOL/L (ref 21–32)
CREAT SERPL-MCNC: 0.59 MG/DL (ref 0.5–1.3)
EGFRCR SERPLBLD CKD-EPI 2021: >90 ML/MIN/1.73M*2
ERYTHROCYTE [DISTWIDTH] IN BLOOD BY AUTOMATED COUNT: 13.5 % (ref 11.5–14.5)
GLUCOSE SERPL-MCNC: 94 MG/DL (ref 74–99)
HCT VFR BLD AUTO: 39.4 % (ref 41–52)
HGB BLD-MCNC: 12.9 G/DL (ref 13.5–17.5)
MCH RBC QN AUTO: 30.6 PG (ref 26–34)
MCHC RBC AUTO-ENTMCNC: 32.7 G/DL (ref 32–36)
MCV RBC AUTO: 94 FL (ref 80–100)
NRBC BLD-RTO: 0 /100 WBCS (ref 0–0)
PLATELET # BLD AUTO: 318 X10*3/UL (ref 150–450)
POTASSIUM SERPL-SCNC: 3.8 MMOL/L (ref 3.5–5.3)
RBC # BLD AUTO: 4.21 X10*6/UL (ref 4.5–5.9)
SODIUM SERPL-SCNC: 134 MMOL/L (ref 136–145)
WBC # BLD AUTO: 8.5 X10*3/UL (ref 4.4–11.3)

## 2024-09-14 PROCEDURE — 36415 COLL VENOUS BLD VENIPUNCTURE: CPT | Performed by: HOSPITALIST

## 2024-09-14 PROCEDURE — 2500000002 HC RX 250 W HCPCS SELF ADMINISTERED DRUGS (ALT 637 FOR MEDICARE OP, ALT 636 FOR OP/ED): Performed by: HOSPITALIST

## 2024-09-14 PROCEDURE — 2500000001 HC RX 250 WO HCPCS SELF ADMINISTERED DRUGS (ALT 637 FOR MEDICARE OP): Performed by: HOSPITALIST

## 2024-09-14 PROCEDURE — 99232 SBSQ HOSP IP/OBS MODERATE 35: CPT | Performed by: STUDENT IN AN ORGANIZED HEALTH CARE EDUCATION/TRAINING PROGRAM

## 2024-09-14 PROCEDURE — 80048 BASIC METABOLIC PNL TOTAL CA: CPT | Performed by: HOSPITALIST

## 2024-09-14 PROCEDURE — 9420000001 HC RT PATIENT EDUCATION 5 MIN

## 2024-09-14 PROCEDURE — 94640 AIRWAY INHALATION TREATMENT: CPT

## 2024-09-14 PROCEDURE — 2500000004 HC RX 250 GENERAL PHARMACY W/ HCPCS (ALT 636 FOR OP/ED): Performed by: HOSPITALIST

## 2024-09-14 PROCEDURE — 85027 COMPLETE CBC AUTOMATED: CPT | Performed by: HOSPITALIST

## 2024-09-14 PROCEDURE — 94664 DEMO&/EVAL PT USE INHALER: CPT

## 2024-09-14 PROCEDURE — 1200000002 HC GENERAL ROOM WITH TELEMETRY DAILY

## 2024-09-14 ASSESSMENT — COGNITIVE AND FUNCTIONAL STATUS - GENERAL
DAILY ACTIVITIY SCORE: 10
DRESSING REGULAR UPPER BODY CLOTHING: TOTAL
HELP NEEDED FOR BATHING: TOTAL
TOILETING: TOTAL
CLIMB 3 TO 5 STEPS WITH RAILING: TOTAL
PERSONAL GROOMING: A LOT
TURNING FROM BACK TO SIDE WHILE IN FLAT BAD: A LOT
MOVING TO AND FROM BED TO CHAIR: TOTAL
DRESSING REGULAR LOWER BODY CLOTHING: TOTAL
WALKING IN HOSPITAL ROOM: TOTAL
MOVING FROM LYING ON BACK TO SITTING ON SIDE OF FLAT BED WITH BEDRAILS: A LITTLE
MOBILITY SCORE: 9
STANDING UP FROM CHAIR USING ARMS: TOTAL

## 2024-09-14 ASSESSMENT — PAIN DESCRIPTION - LOCATION: LOCATION: FOOT

## 2024-09-14 ASSESSMENT — PAIN SCALES - GENERAL
PAINLEVEL_OUTOF10: 3
PAINLEVEL_OUTOF10: 7
PAINLEVEL_OUTOF10: 0 - NO PAIN

## 2024-09-14 ASSESSMENT — PAIN DESCRIPTION - ORIENTATION: ORIENTATION: RIGHT;LEFT

## 2024-09-14 ASSESSMENT — PAIN - FUNCTIONAL ASSESSMENT: PAIN_FUNCTIONAL_ASSESSMENT: 0-10

## 2024-09-14 NOTE — CARE PLAN
The patient's goals for the shift include no falls    The clinical goals for the shift include Patient will have adequate output this shift.    Problem: Nutrition  Goal: Less than 5 days NPO/clear liquids  Outcome: Progressing  Goal: Consume prescribed supplement  Outcome: Progressing  Goal: Nutrition support goals are met within 48 hrs  Outcome: Progressing  Goal: Nutrition support is meeting 75% of nutrient needs  Outcome: Progressing  Goal: BG  mg/dL  Outcome: Progressing  Goal: Lab values WNL  Outcome: Progressing  Goal: Electrolytes WNL  Outcome: Progressing  Goal: Promote healing  Outcome: Progressing  Goal: Maintain stable weight  Outcome: Progressing  Goal: Reduce weight from edema/fluid  Outcome: Progressing     Problem: Discharge Planning  Goal: Discharge to home or other facility with appropriate resources  Outcome: Progressing     Problem: Chronic Conditions and Co-morbidities  Goal: Patient's chronic conditions and co-morbidity symptoms are monitored and maintained or improved  Outcome: Progressing     Problem: Skin  Goal: Decreased wound size/increased tissue granulation at next dressing change  Outcome: Progressing  Goal: Participates in plan/prevention/treatment measures  Outcome: Progressing  Goal: Prevent/manage excess moisture  Outcome: Progressing  Goal: Prevent/minimize sheer/friction injuries  Outcome: Progressing  Goal: Promote/optimize nutrition  Outcome: Progressing  Goal: Promote skin healing  Outcome: Progressing  Flowsheets (Taken 9/13/2024 2227)  Promote skin healing: Turn/reposition every 2 hours/use positioning/transfer devices  Note: Mepilex present to coccyx, removed for BM. This nurse applies new mepilex and makes Hospitalist aware of need for wound care orders to [facility]. Patient turned and offloaded on pillows for comfort and wound progression prevention.

## 2024-09-14 NOTE — CARE PLAN
The patient's goals for the shift include no falls keep feeling better    The clinical goals for the shift include Patient will have adequate output this shift. No falls, labs and vs wdl, adequate urine output this shift, promote healing for wounds

## 2024-09-14 NOTE — PROGRESS NOTES
Raheem Hilton is a 64 y.o. male on day 5 of admission presenting with UTI (urinary tract infection).      Subjective   Patient seen and examined he is doing well.  No issues.       Objective     Last Recorded Vitals  BP (!) 149/92 (BP Location: Left arm, Patient Position: Sitting)   Pulse 79   Temp 36.7 °C (98.1 °F) (Temporal)   Resp 16   Wt 80 kg (176 lb 5.9 oz)   SpO2 97%   Intake/Output last 3 Shifts:    Intake/Output Summary (Last 24 hours) at 9/14/2024 1648  Last data filed at 9/14/2024 1300  Gross per 24 hour   Intake 560 ml   Output 1350 ml   Net -790 ml       Admission Weight  Weight: 83.8 kg (184 lb 11.9 oz) (09/09/24 1738)    Daily Weight  09/10/24 : 80 kg (176 lb 5.9 oz)    Image Results  CT abdomen pelvis w IV contrast  Narrative: STUDY:  CT Abdomen and Pelvis with IV Contrast; 8/12/2024 at 5:44 AM.  INDICATION:  Lower abdominal pain.  COMPARISON:  CT AP 1/8/2024  ACCESSION NUMBER(S):  [id number]  ORDERING CLINICIAN:  TYLER CUNNINGHAM  TECHNIQUE:  CT of the abdomen and pelvis was performed.  Contiguous axial images  were obtained at 3 mm slice thickness through the abdomen and pelvis.   Coronal and sagittal reconstructions at 3 mm slice thickness were  performed.  Omnipaque 350 69 mL was administered intravenously.    FINDINGS:  LOWER CHEST:  No cardiomegaly.  No pericardial effusion.  Volume loss and  atelectasis right lower lobe     ABDOMEN:     LIVER:  No hepatomegaly.  Smooth surface contour.  Normal attenuation.     BILE DUCTS:  No intrahepatic or extrahepatic biliary ductal dilatation.     GALLBLADDER:  The gallbladder is negative.  STOMACH:  No abnormalities identified.     PANCREAS:  No masses or ductal dilatation.     SPLEEN:  Absent     ADRENAL GLANDS:  No thickening or nodules.     KIDNEYS AND URETERS:  Kidneys are normal in size and location.  0.6 cm stone within the mid  right kidney.  1.7 cm stone within the right renal pelvis     PELVIS:     BLADDER:  Diffuse bladder wall  thickening with suprapubic catheter     REPRODUCTIVE ORGANS:  No abnormalities identified.     BOWEL:  Normal appendix.  Constipation and postsurgical changes within the  sigmoid colon.  Negative for bowel obstruction     VESSELS:  No abnormalities identified.  Abdominal aorta is normal in caliber.   Atherosclerotic changes within the abdominal aorta     PERITONEUM/RETROPERITONEUM/LYMPH NODES:  No free fluid.  No pneumoperitoneum.  Multiple small inguinal and pelvic lymph nodes are similar to prior     ABDOMINAL WALL:  No abnormalities identified.  SOFT TISSUES:   Sacral and bilateral buttock decubitus ulcers     BONES:  Sclerosis along the posterior right ischium and L3 compression  fracture.  Absence of the coccyx and lower sacrum  Impression: Constipation without bowel obstruction.  Normal appendix.  1.7 cm nonobstructing stone right renal pelvis has increased in size  compared to CT scan 1/8/2024.  0.6 cm nonobstructing stone mid right kidney is new.  A suprapubic bladder catheter with diffuse bladder wall  thickening/cystitis.  Chronic sacral and bilateral buttock decubitus ulcers with chronic  sclerosis/osteomyelitis right ischium.  Absence of the coccyx and  distal sacrum may represent chronic erosion or postsurgical changes  No CT findings of soft tissue abscess or drainable fluid collection  Old L3 compression fracture.  Signed by Aurelio Frazier MD      Physical Exam  Constitutional:       Appearance: Normal appearance.   HENT:      Head: Normocephalic and atraumatic.      Nose: Nose normal.   Eyes:      Pupils: Pupils are equal, round, and reactive to light.   Cardiovascular:      Rate and Rhythm: Normal rate and regular rhythm.      Pulses: Normal pulses.      Heart sounds: Normal heart sounds.   Pulmonary:      Effort: Pulmonary effort is normal.   Abdominal:      General: Abdomen is flat.      Palpations: Abdomen is soft.   Genitourinary:     Comments: Suprapubic catheter  Musculoskeletal:      Cervical  back: Normal range of motion.      Comments: Paraplegia   Skin:     General: Skin is dry.      Comments: Sacral ulcer   Neurological:      General: No focal deficit present.      Mental Status: He is alert.         Relevant Results           Scheduled medications  atorvastatin, 80 mg, oral, Nightly  baclofen, 10 mg, oral, TID  carBAMazepine, 200 mg, oral, BID  cefTRIAXone, 2 g, intravenous, q24h  enoxaparin, 40 mg, subcutaneous, Daily  gabapentin, 600 mg, oral, TID  lidocaine, 5 mL, infiltration, Once  methIMAzole, 5 mg, oral, BID  metoprolol succinate XL, 25 mg, oral, Daily  polyethylene glycol, 17 g, oral, Daily      Continuous medications     PRN medications  PRN medications: acetaminophen **OR** acetaminophen **OR** acetaminophen, acetaminophen **OR** acetaminophen **OR** acetaminophen, albuterol, HYDROcodone-acetaminophen, loperamide, ondansetron ODT **OR** ondansetron      Assessment/Plan      64-year-old male with past medical history multiple sclerosis, paraplegia, neurogenic bladder, recurrent UTI with history of multiple drugs, COPD, DM, Chronic Sacral Decubitus Ulcer, Hypertension, Hyperthyroidism, Seizures Presents with Acute UTI.  Patient Was Not Bleeding or Suprapubic Catheter or Admission.  Suprapubic Catheter Was Changed Admission.  Patient Had a Gram-Negative Bacteremia Diagnosed in outside Facility.  He Was Started on Meropenem and Switched to IV Ceftriaxone Based on Sensitivities.    Sensitivities t from [facility] reviewed today.  Per pharmacy recommendation patient can go on Augmentin 875 twice daily per oral for the rest of the 14 days.  Continue other home medication methimazole, carbamazepine, Neurontin  On metoprolol  Frequently positioning for sacral ulcer  With plan for discharge to SNF tomorrow    DVT prophylaxis: Lovenox  GI prophylaxis: None  Full code                     Jose M Joseph MD

## 2024-09-14 NOTE — PROGRESS NOTES
Raheem Hilton is a 64 y.o. male on day 5 of admission presenting with UTI (urinary tract infection).      Subjective          Objective     Last Recorded Vitals  BP (!) 149/92 (BP Location: Left arm, Patient Position: Sitting)   Pulse 79   Temp 36.7 °C (98.1 °F) (Temporal)   Resp 16   Wt 80 kg (176 lb 5.9 oz)   SpO2 97%   Intake/Output last 3 Shifts:    Intake/Output Summary (Last 24 hours) at 9/14/2024 1617  Last data filed at 9/14/2024 1300  Gross per 24 hour   Intake 560 ml   Output 1350 ml   Net -790 ml       Admission Weight  Weight: 83.8 kg (184 lb 11.9 oz) (09/09/24 1738)    Daily Weight  09/10/24 : 80 kg (176 lb 5.9 oz)    Image Results  CT abdomen pelvis w IV contrast  Narrative: STUDY:  CT Abdomen and Pelvis with IV Contrast; 8/12/2024 at 5:44 AM.  INDICATION:  Lower abdominal pain.  COMPARISON:  CT AP 1/8/2024  ACCESSION NUMBER(S):  [id number]  ORDERING CLINICIAN:  TYLER CUNNINGHAM  TECHNIQUE:  CT of the abdomen and pelvis was performed.  Contiguous axial images  were obtained at 3 mm slice thickness through the abdomen and pelvis.   Coronal and sagittal reconstructions at 3 mm slice thickness were  performed.  Omnipaque 350 69 mL was administered intravenously.    FINDINGS:  LOWER CHEST:  No cardiomegaly.  No pericardial effusion.  Volume loss and  atelectasis right lower lobe     ABDOMEN:     LIVER:  No hepatomegaly.  Smooth surface contour.  Normal attenuation.     BILE DUCTS:  No intrahepatic or extrahepatic biliary ductal dilatation.     GALLBLADDER:  The gallbladder is negative.  STOMACH:  No abnormalities identified.     PANCREAS:  No masses or ductal dilatation.     SPLEEN:  Absent     ADRENAL GLANDS:  No thickening or nodules.     KIDNEYS AND URETERS:  Kidneys are normal in size and location.  0.6 cm stone within the mid  right kidney.  1.7 cm stone within the right renal pelvis     PELVIS:     BLADDER:  Diffuse bladder wall thickening with suprapubic catheter     REPRODUCTIVE  ORGANS:  No abnormalities identified.     BOWEL:  Normal appendix.  Constipation and postsurgical changes within the  sigmoid colon.  Negative for bowel obstruction     VESSELS:  No abnormalities identified.  Abdominal aorta is normal in caliber.   Atherosclerotic changes within the abdominal aorta     PERITONEUM/RETROPERITONEUM/LYMPH NODES:  No free fluid.  No pneumoperitoneum.  Multiple small inguinal and pelvic lymph nodes are similar to prior     ABDOMINAL WALL:  No abnormalities identified.  SOFT TISSUES:   Sacral and bilateral buttock decubitus ulcers     BONES:  Sclerosis along the posterior right ischium and L3 compression  fracture.  Absence of the coccyx and lower sacrum  Impression: Constipation without bowel obstruction.  Normal appendix.  1.7 cm nonobstructing stone right renal pelvis has increased in size  compared to CT scan 1/8/2024.  0.6 cm nonobstructing stone mid right kidney is new.  A suprapubic bladder catheter with diffuse bladder wall  thickening/cystitis.  Chronic sacral and bilateral buttock decubitus ulcers with chronic  sclerosis/osteomyelitis right ischium.  Absence of the coccyx and  distal sacrum may represent chronic erosion or postsurgical changes  No CT findings of soft tissue abscess or drainable fluid collection  Old L3 compression fracture.  Signed by Aurelio Frazier MD      Physical Exam    Relevant Results  {If you would like to pull in Medications, type .meds     If you would like to pull in Lab results for the last 24 hours, type .[id number]    If you would like to pull in Imaging results, type .imgrslt :99}      {Link to Stroke Scoring tools - Link :99}       Assessment/Plan                  Assessment & Plan  UTI (urinary tract infection)    ***              Jose M Joseph MD

## 2024-09-14 NOTE — NURSING NOTE
Patient states to this nurse that he has changed his mind and would like the PICC line.  Dr. Catracho Walden made aware at nurses station.

## 2024-09-15 ENCOUNTER — PHARMACY VISIT (OUTPATIENT)
Dept: PHARMACY | Facility: CLINIC | Age: 64
End: 2024-09-15
Payer: COMMERCIAL

## 2024-09-15 VITALS
OXYGEN SATURATION: 94 % | RESPIRATION RATE: 16 BRPM | HEIGHT: 66 IN | BODY MASS INDEX: 28.34 KG/M2 | HEART RATE: 84 BPM | DIASTOLIC BLOOD PRESSURE: 81 MMHG | TEMPERATURE: 98 F | WEIGHT: 176.37 LBS | SYSTOLIC BLOOD PRESSURE: 122 MMHG

## 2024-09-15 LAB — SARS-COV-2 RNA RESP QL NAA+PROBE: NOT DETECTED

## 2024-09-15 PROCEDURE — 99239 HOSP IP/OBS DSCHRG MGMT >30: CPT | Performed by: STUDENT IN AN ORGANIZED HEALTH CARE EDUCATION/TRAINING PROGRAM

## 2024-09-15 PROCEDURE — 2500000001 HC RX 250 WO HCPCS SELF ADMINISTERED DRUGS (ALT 637 FOR MEDICARE OP): Performed by: HOSPITALIST

## 2024-09-15 PROCEDURE — 2500000004 HC RX 250 GENERAL PHARMACY W/ HCPCS (ALT 636 FOR OP/ED): Performed by: HOSPITALIST

## 2024-09-15 PROCEDURE — 2500000002 HC RX 250 W HCPCS SELF ADMINISTERED DRUGS (ALT 637 FOR MEDICARE OP, ALT 636 FOR OP/ED): Performed by: HOSPITALIST

## 2024-09-15 PROCEDURE — RXMED WILLOW AMBULATORY MEDICATION CHARGE

## 2024-09-15 PROCEDURE — 87635 SARS-COV-2 COVID-19 AMP PRB: CPT | Performed by: STUDENT IN AN ORGANIZED HEALTH CARE EDUCATION/TRAINING PROGRAM

## 2024-09-15 PROCEDURE — 94640 AIRWAY INHALATION TREATMENT: CPT

## 2024-09-15 RX ORDER — HYDROCODONE BITARTRATE AND ACETAMINOPHEN 10; 325 MG/1; MG/1
1 TABLET ORAL EVERY 6 HOURS PRN
Qty: 20 TABLET | Refills: 0 | Status: SHIPPED | OUTPATIENT
Start: 2024-09-15 | End: 2024-09-18

## 2024-09-15 RX ORDER — AMOXICILLIN AND CLAVULANATE POTASSIUM 875; 125 MG/1; MG/1
1 TABLET, FILM COATED ORAL 2 TIMES DAILY
Qty: 14 TABLET | Refills: 0 | Status: SHIPPED | OUTPATIENT
Start: 2024-09-15 | End: 2024-09-22

## 2024-09-15 ASSESSMENT — COGNITIVE AND FUNCTIONAL STATUS - GENERAL
MOVING FROM LYING ON BACK TO SITTING ON SIDE OF FLAT BED WITH BEDRAILS: A LITTLE
DRESSING REGULAR UPPER BODY CLOTHING: TOTAL
HELP NEEDED FOR BATHING: TOTAL
PERSONAL GROOMING: A LOT
DAILY ACTIVITIY SCORE: 10
TOILETING: TOTAL
TURNING FROM BACK TO SIDE WHILE IN FLAT BAD: A LOT
STANDING UP FROM CHAIR USING ARMS: TOTAL
DRESSING REGULAR LOWER BODY CLOTHING: TOTAL
MOVING TO AND FROM BED TO CHAIR: TOTAL
MOBILITY SCORE: 9
CLIMB 3 TO 5 STEPS WITH RAILING: TOTAL
WALKING IN HOSPITAL ROOM: TOTAL

## 2024-09-15 ASSESSMENT — PAIN SCALES - GENERAL: PAINLEVEL_OUTOF10: 0 - NO PAIN

## 2024-09-15 NOTE — CARE PLAN
The patient's goals for the shift include no falls feel better    The clinical goals for the shift include Patient will have adequate output this shift. No falls, good output from suprapubic cath, labs and vs wdl

## 2024-09-15 NOTE — DISCHARGE SUMMARY
Discharge Diagnosis  UTI (urinary tract infection)    Issues Requiring Follow-Up      Discharge Meds     Medication List      START taking these medications     amoxicillin-pot clavulanate 875-125 mg tablet; Commonly known as:   Augmentin; Take 1 tablet by mouth 2 times a day for 7 days.     CONTINUE taking these medications     acetaminophen 325 mg tablet; Commonly known as: Tylenol; Take 2 tablets   (650 mg) by mouth every 4 hours if needed for moderate pain (4 - 6).   albuterol 2.5 mg /3 mL (0.083 %) nebulizer solution; Take 3 mL (2.5 mg)   by nebulization every 6 hours if needed for wheezing.   atorvastatin 80 mg tablet; Commonly known as: Lipitor   baclofen 10 mg tablet; Commonly known as: Lioresal   BOOST PLUS ORAL   carBAMazepine 200 mg tablet; Commonly known as: TEGretol   COVID-19 AT-HOME TEST MISC   gabapentin 600 mg tablet; Commonly known as: Neurontin   HYDROcodone-acetaminophen 5-325 mg tablet; Commonly known as: Norco   lactobacillus acidophilus capsule   loperamide 2 mg capsule; Commonly known as: Imodium   methIMAzole 5 mg tablet; Commonly known as: Tapazole   metoprolol succinate XL 25 mg 24 hr tablet; Commonly known as: Toprol-XL   ondansetron 4 mg tablet; Commonly known as: Zofran   STRESS B PLUS ZINC ORAL       Test Results Pending At Discharge  Pending Labs       No current pending labs.            Hospital Course   64 y.o. male presenting with sepsis possible UTI from outside ER facility with suprapubic catheter malfunction, obstruction blockage and directed by urology for admission here.  Patient has history of MS with multiple recurrent UTI.  Change in mental status, lactic acidosis, leukocytosis, abdominal distention, CT abdomen pelvis with constipation fecal impaction, diffuse bladder wall thickening, moderate hydronephrosis bilaterally and nonobstructing stone in right kidney measuring 1.7 cm.  Stone and prostatic segment of urethra measuring 6 mm which is new.  Large area of decubitus  ulceration overlying sacrum.  No soft tissue gas or fluid in the area.  Chest x-ray with mild pulmonary vascular congestion.  No effusion no pneumothorax.  CT head negative for acute process.  Patient was given IV fluids and broad-spectrum IV antibiotics vancomycin Zosyn in outside ED     During Course in the hospital patient was started on broad-spectrum antibiotics..  Urine cultures grew Proteus sensitive to ceftriaxone.  Patient was discussed with IV options.  Patient refused IV ceftriaxone and requested for oral regimen.  After discussion with the pharmacy I was okay to give patient Augmentin for rest of the course.  Patient prescribed Augmentin at discharge to nursing facility.  Patient vitals Remained stable throughout the hospitalization    Pertinent Physical Exam At Time of Discharge  Physical Exam  Physical Exam  Constitutional:       Appearance: Normal appearance.   HENT:      Head: Normocephalic and atraumatic.      Nose: Nose normal.   Eyes:      Pupils: Pupils are equal, round, and reactive to light.   Cardiovascular:      Rate and Rhythm: Normal rate and regular rhythm.      Pulses: Normal pulses.      Heart sounds: Normal heart sounds.   Pulmonary:      Effort: Pulmonary effort is normal.   Abdominal:      General: Abdomen is flat.      Palpations: Abdomen is soft.   Genitourinary:     Comments: Suprapubic catheter  Musculoskeletal:      Cervical back: Normal range of motion.      Comments: Paraplegia   Skin:     General: Skin is dry.      Comments: Sacral ulcer   Neurological:      General: No focal deficit present.      Mental Status: He is alert.     Outpatient Follow-Up  Future Appointments   Date Time Provider Department Center   2/24/2025 10:15 AM [facility] ULTRASOUND 1 RIDGE Joseph MD

## 2024-09-16 ENCOUNTER — DOCUMENTATION (OUTPATIENT)
Dept: UROLOGY | Facility: CLINIC | Age: 64
End: 2024-09-16
Payer: MEDICARE

## 2024-09-16 NOTE — PROGRESS NOTES
Grabiel from [facility] states patient is cancelling his prostate bx on 10/1/24 and understands the risks of not having any follow up treatment.

## 2024-09-17 NOTE — DOCUMENTATION CLARIFICATION NOTE
"    PATIENT:               ARNOLD VO  ACCT #:                  [id number]  MRN:                       [id number]  :                       1960  ADMIT DATE:       2024 5:10 PM  DISCH DATE:        9/15/2024 5:54 PM  RESPONDING PROVIDER #:        [id number]          PROVIDER RESPONSE TEXT:    UTI related to Suprapubic catheter    CDI QUERY TEXT:    Clarification    Instruction:    Based on your assessment of the patient and the clinical information, please provide the requested documentation by clicking on the appropriate radio button and enter any additional information if prompted.    Question: Please further clarify the relationship between the UTI and the urinary device    When answering this query, please exercise your independent professional judgment. The fact that a question is being asked, does not imply that any particular answer is desired or expected.    The patient's clinical indicators include:  Clinical Information: 64 y.o. male presenting with sepsis possible UTI from outside ER facility with suprapubic catheter    Clinical Indicators: H and P notes \"Acute UTI - Acute urine retention - Suprapubic catheter malfunction - Difficult [place] with resistance\".   UA shows 2 + bacteria, negative nitrite- turbid - 2 + blood- no culture report to date    Treatment: IV fluids 100 ml/hr- Meropenem IV q 8 hr 24 to date    Risk Factors: Suprapubic catheter- abnormal UA, metabolic encephalopathy, bedbound primarily and functional paraplegia- and CT shows \"The patient is a suprapubic bladder catheter.  However, the bladder is significantly distended with urine.  There is diffuse bladder wall thickening.  The distended urinary bladder results in moderate hydronephrosis bilaterally.  3. Non-obstructing stone in the right kidney measures 1.7 cm.  There is a stone in the prostatic segment of the urethra measuring 6 mm which is new\".  Options provided:  -- UTI related to Suprapubic catheter  -- UTI unrelated " to Suprapubic catheter  -- Other - I will add my own diagnosis  -- Refer to Clinical Documentation Reviewer    Query created by: Key Colvin on 9/11/2024 10:51 AM      Electronically signed by:  JESUS MANUEL SALAZAR MD 9/17/2024 1:46 AM

## 2024-09-17 NOTE — DOCUMENTATION CLARIFICATION NOTE
"    PATIENT:               ARNOLD VO  ACCT #:                  [id number]  MRN:                       [id number]  :                       1960  ADMIT DATE:       2024 5:10 PM  DISCH DATE:        9/15/2024 5:54 PM  RESPONDING PROVIDER #:        [id number]          PROVIDER RESPONSE TEXT:    Sepsis with neurological organ dysfunction of Metabolic encephalopathy organ dysfunction    CDI QUERY TEXT:    Clarification    Instruction:    Based on your assessment of the patient and the clinical information, please provide the requested documentation by clicking on the appropriate radio button and enter any additional information if prompted.    Question: Please further clarify if a relationship exists between the Sepsis and acute organ dysfunction    When answering this query, please exercise your independent professional judgment. The fact that a question is being asked, does not imply that any particular answer is desired or expected.    The patient's clinical indicators include:  Clinical Information: 64 y.o. male presenting with sepsis possible UTI from outside ER facility with suprapubic catheter malfunction, obstruction blockage and directed by urology for admission here.    Clinical Indicators:  H and P notes - AAO x1- Metabolic encephalopathy, confusion, VS 1738- 36.9-92-/78-99 percent sat on RA.  Labs WBC 20.8- Lactate 1.0 and blood cultures negative to date    Treatment: IV fluids infusion only 100ml/hr, Meropenem 1 gm q 8 hours -    Risk Factors: Suprapubic catheter, elevated WBC CT shows \"there is a suprapubic catheter extending into the bladder. ?There is diffuse bladder wall thickening. ?The bladder is distended with urine. ?The prostatic urethra is noted to be distended. ?There is a stone in the prostatic segment of the urethra measuring 6 mm in diameter\".  Options provided:  -- Sepsis with neurological organ dysfunction of Metabolic encephalopathy organ dysfunction  -- Other - I will " add my own diagnosis  -- Refer to Clinical Documentation Reviewer    Query created by: Key Colvin on 9/11/2024 10:20 AM      Electronically signed by:  JESUS MANUEL SALAZAR MD 9/17/2024 1:46 AM

## 2024-10-02 ENCOUNTER — PREP FOR PROCEDURE (OUTPATIENT)
Dept: UROLOGY | Facility: CLINIC | Age: 64
End: 2024-10-02
Payer: MEDICARE

## 2024-10-02 DIAGNOSIS — R97.20 ELEVATED PSA: ICD-10-CM

## 2024-10-02 RX ORDER — SODIUM CHLORIDE, SODIUM LACTATE, POTASSIUM CHLORIDE, CALCIUM CHLORIDE 600; 310; 30; 20 MG/100ML; MG/100ML; MG/100ML; MG/100ML
20 INJECTION, SOLUTION INTRAVENOUS CONTINUOUS
OUTPATIENT
Start: 2024-10-02

## 2024-10-21 ENCOUNTER — ANESTHESIA EVENT (OUTPATIENT)
Dept: OPERATING ROOM | Facility: HOSPITAL | Age: 64
End: 2024-10-21
Payer: MEDICARE

## 2024-10-21 RX ORDER — HYDROMORPHONE HYDROCHLORIDE 2 MG/ML
0.5 INJECTION, SOLUTION INTRAMUSCULAR; INTRAVENOUS; SUBCUTANEOUS EVERY 5 MIN PRN
Status: CANCELLED | OUTPATIENT
Start: 2024-10-21

## 2024-10-21 RX ORDER — ONDANSETRON HYDROCHLORIDE 2 MG/ML
4 INJECTION, SOLUTION INTRAVENOUS
Status: CANCELLED | OUTPATIENT
Start: 2024-10-21

## 2024-10-21 RX ORDER — OXYCODONE HYDROCHLORIDE 5 MG/1
5 TABLET ORAL ONCE
Status: CANCELLED | OUTPATIENT
Start: 2024-10-21 | End: 2024-10-21

## 2024-10-22 ENCOUNTER — ANESTHESIA (OUTPATIENT)
Dept: OPERATING ROOM | Facility: HOSPITAL | Age: 64
End: 2024-10-22
Payer: MEDICARE

## 2024-10-22 ENCOUNTER — HOSPITAL ENCOUNTER (OUTPATIENT)
Facility: HOSPITAL | Age: 64
Setting detail: OUTPATIENT SURGERY
Discharge: NURSING, REHAB OR LONG-TERM CARE | End: 2024-10-22
Attending: UROLOGY | Admitting: UROLOGY
Payer: MEDICARE

## 2024-10-22 VITALS
OXYGEN SATURATION: 99 % | DIASTOLIC BLOOD PRESSURE: 93 MMHG | TEMPERATURE: 97 F | WEIGHT: 170 LBS | HEART RATE: 80 BPM | BODY MASS INDEX: 27.32 KG/M2 | SYSTOLIC BLOOD PRESSURE: 147 MMHG | HEIGHT: 66 IN | RESPIRATION RATE: 15 BRPM

## 2024-10-22 DIAGNOSIS — R97.20 ELEVATED PSA: Primary | ICD-10-CM

## 2024-10-22 PROBLEM — J42 CHRONIC BRONCHITIS (MULTI): Status: ACTIVE | Noted: 2024-10-22

## 2024-10-22 PROBLEM — K21.9 GASTROESOPHAGEAL REFLUX DISEASE: Status: ACTIVE | Noted: 2024-10-22

## 2024-10-22 PROBLEM — I10 PRIMARY HYPERTENSION: Status: ACTIVE | Noted: 2024-10-22

## 2024-10-22 PROCEDURE — 76872 US TRANSRECTAL: CPT | Performed by: UROLOGY

## 2024-10-22 PROCEDURE — 2500000004 HC RX 250 GENERAL PHARMACY W/ HCPCS (ALT 636 FOR OP/ED): Performed by: UROLOGY

## 2024-10-22 PROCEDURE — 3700000001 HC GENERAL ANESTHESIA TIME - INITIAL BASE CHARGE: Performed by: UROLOGY

## 2024-10-22 PROCEDURE — 3700000002 HC GENERAL ANESTHESIA TIME - EACH INCREMENTAL 1 MINUTE: Performed by: UROLOGY

## 2024-10-22 PROCEDURE — 3600000004 HC OR TIME - INITIAL BASE CHARGE - PROCEDURE LEVEL FOUR: Performed by: UROLOGY

## 2024-10-22 PROCEDURE — 7100000010 HC PHASE TWO TIME - EACH INCREMENTAL 1 MINUTE: Performed by: UROLOGY

## 2024-10-22 PROCEDURE — 7100000009 HC PHASE TWO TIME - INITIAL BASE CHARGE: Performed by: UROLOGY

## 2024-10-22 PROCEDURE — 2500000004 HC RX 250 GENERAL PHARMACY W/ HCPCS (ALT 636 FOR OP/ED): Performed by: ANESTHESIOLOGY

## 2024-10-22 PROCEDURE — 3600000009 HC OR TIME - EACH INCREMENTAL 1 MINUTE - PROCEDURE LEVEL FOUR: Performed by: UROLOGY

## 2024-10-22 RX ORDER — PROPOFOL 10 MG/ML
INJECTION, EMULSION INTRAVENOUS AS NEEDED
Status: DISCONTINUED | OUTPATIENT
Start: 2024-10-22 | End: 2024-10-22

## 2024-10-22 RX ORDER — SODIUM CHLORIDE, SODIUM LACTATE, POTASSIUM CHLORIDE, CALCIUM CHLORIDE 600; 310; 30; 20 MG/100ML; MG/100ML; MG/100ML; MG/100ML
20 INJECTION, SOLUTION INTRAVENOUS CONTINUOUS
Status: DISCONTINUED | OUTPATIENT
Start: 2024-10-22 | End: 2024-10-22 | Stop reason: HOSPADM

## 2024-10-22 RX ORDER — LIDOCAINE HYDROCHLORIDE 10 MG/ML
INJECTION, SOLUTION EPIDURAL; INFILTRATION; INTRACAUDAL; PERINEURAL AS NEEDED
Status: DISCONTINUED | OUTPATIENT
Start: 2024-10-22 | End: 2024-10-22

## 2024-10-22 RX ORDER — LEVOFLOXACIN 5 MG/ML
500 INJECTION, SOLUTION INTRAVENOUS ONCE
Status: COMPLETED | OUTPATIENT
Start: 2024-10-22 | End: 2024-10-22

## 2024-10-22 RX ORDER — MIDAZOLAM HYDROCHLORIDE 1 MG/ML
2 INJECTION INTRAMUSCULAR; INTRAVENOUS ONCE AS NEEDED
Status: COMPLETED | OUTPATIENT
Start: 2024-10-22 | End: 2024-10-22

## 2024-10-22 SDOH — HEALTH STABILITY: MENTAL HEALTH: CURRENT SMOKER: 0

## 2024-10-22 ASSESSMENT — ENCOUNTER SYMPTOMS
COUGH: 0
ENDOCRINE NEGATIVE: 1
DIFFICULTY URINATING: 0
CHILLS: 0
FEVER: 0
EYES NEGATIVE: 1
PSYCHIATRIC NEGATIVE: 1
SHORTNESS OF BREATH: 0
NAUSEA: 0
ALLERGIC/IMMUNOLOGIC NEGATIVE: 1

## 2024-10-22 ASSESSMENT — PAIN SCALES - GENERAL: PAINLEVEL_OUTOF10: 0 - NO PAIN

## 2024-10-22 ASSESSMENT — PAIN - FUNCTIONAL ASSESSMENT
PAIN_FUNCTIONAL_ASSESSMENT: 0-10
PAIN_FUNCTIONAL_ASSESSMENT: 0-10

## 2024-10-22 NOTE — PERIOPERATIVE NURSING NOTE
Procedure unable to be completed.  [facility] notified, no new orders.  Patient dredded with aid of 2 people, returned to .  Waiting on transport from [facility].

## 2024-10-22 NOTE — OP NOTE
Biopsy Prostate with Navigation, Ultrasonography Transrectal Prostate, Ultrasound guidance for prostate fusion bx Operative Note     Date: 10/22/2024  OR Location: [facility] OR    Name: Raheem Hilton, : 1960, Age: 64 y.o., MRN: [id number], Sex: male    Diagnosis  Pre-op Diagnosis      * Elevated PSA [R97.20] Post-op Diagnosis     * Elevated PSA [R97.20]     Procedures  Biopsy Prostate with Navigation  99251 - NE PROSTATE NEEDLE BIOPSY ANY APPROACH    Ultrasonography Transrectal Prostate  16798 - CHG US TRANSRECTAL    Ultrasound guidance for prostate fusion bx  04656 - CHG US GUIDANCE NEEDLE PLACEMENT IMG S&I      Surgeons      * Jovany Nielson - Primary    Resident/Fellow/Other Assistant:  Surgeons and Role:  * No surgeons found with a matching role *    Procedure Summary  Anesthesia: Anesthesia type not filed in the log.  ASA: ASA status not filed in the log.  Anesthesia Staff: Anesthesiologist: Jovany Tineo MD PhD  Estimated Blood Loss: 0mL  Intra-op Medications:   Administrations occurring from 0900 to 0915 on 10/22/24:   Medication Name Total Dose   levoFLOXacin (Levaquin) 500 mg in dextrose 5%  mL Cannot be calculated              Anesthesia Record               Intraprocedure I/O Totals       None           Specimen: No specimens collected     Staff:   Circulator: Brenda Gross Person: Phillip         Drains and/or Catheters:   Suprapubic Catheter 18 Fr. (Active)         Indications: Raheem Hilton is an 64 y.o. male who is having surgery for Elevated PSA [R97.20].     The patient was seen in the preoperative area. The risks, benefits, complications, treatment options, non-operative alternatives, expected recovery and outcomes were discussed with the patient. The possibilities of reaction to medication, pulmonary aspiration, injury to surrounding structures, bleeding, recurrent infection, the need for additional procedures, failure to diagnose a condition, and creating a complication requiring  transfusion or operation were discussed with the patient. The patient concurred with the proposed plan, giving informed consent.  The site of surgery was properly noted/marked if necessary per policy. The patient has been actively warmed in preoperative area.   Pre Op dx: Elevated PSA and Abnormal MRI of the prostate    Post Op Dx: SAME    Procedure: MRI Guided Fusion Bx of the prostate    Physician: ELIZABETH    Anesthesia: MAC    Estimated Blood Loss: Minimal    Complications: NONE    Indications and Consent: Patient present for prostate Biopsy of lesion found on MRI. After the risks,, benefits, and indications were explained he consented to the procedure.    PROCEDURE: After adequate sedation was obtained the ultrasound probe was inserted into the rectum. Unfortunately he was impacted with stool. I attempted manual disimpaction making visualization impossible. The procedure was terminated. Attending Attestation: I was present and scrubbed for the entire procedure.    Jovany Nielson  Phone Number: [phone]

## 2024-10-22 NOTE — H&P
History Of Present Illness  Raheem Hilton is a 64 y.o. male presenting with elevated PSA.     Past Medical History  Past Medical History:   Diagnosis Date    Anemia     Bowel perforation (Multi)     Buttock wound, unspecified laterality, subsequent encounter     COPD (chronic obstructive pulmonary disease) (Multi)     Extended spectrum beta lactamase (ESBL) resistance     GERD (gastroesophageal reflux disease)     Hyperlipidemia     Hypertension     Hypo-osmolality and hyponatremia     Insomnia     Multiple sclerosis (Multi)     Neurogenic bladder     Neuromuscular dysfunction of bladder     Personal history of transient ischemic attack (TIA), and cerebral infarction without residual deficits 08/06/2020    History of embolic stroke    Pulmonary embolism     Sepsis with encephalopathy (Multi)     Septic shock (Multi)     Simple febrile convulsions (Multi)     Status post administration of tPA (rtPA) in a different facility within the last 24 hours prior to admission to current facility 08/06/2020    Tissue plasminogen activator (tPA) administered at other facility within 24 hours before current admission    UTI (urinary tract infection)        Surgical History  Past Surgical History:   Procedure Laterality Date    MR HEAD ANGIO WO IV CONTRAST  6/29/2020    MR HEAD ANGIO WO IV CONTRAST 6/29/2020 [facility] CLINICAL LEGACY    MR NECK ANGIO WO IV CONTRAST  6/29/2020    MR NECK ANGIO WO IV CONTRAST 6/29/2020 [facility] CLINICAL LEGACY        Social History  He reports that he has never smoked. He has never used smokeless tobacco. No history on file for alcohol use and drug use.    Family History  No family history on file.     Allergies  Cefepime, Doxycycline, Heparin, and Sulfamethoxazole-trimethoprim    Review of Systems   Constitutional:  Negative for chills and fever.   HENT: Negative.     Eyes: Negative.    Respiratory:  Negative for cough and shortness of breath.    Cardiovascular:  Negative for chest pain and leg swelling.    Gastrointestinal:  Negative for nausea.   Endocrine: Negative.    Genitourinary:  Negative for difficulty urinating.        Negative except for documented in HPI   Allergic/Immunologic: Negative.    Neurological:         Alert & oriented X 3   Hematological:         Denies blood thinners   Psychiatric/Behavioral: Negative.          Physical Exam  Vitals and nursing note reviewed.   Pulmonary:      Effort: Pulmonary effort is normal.   Abdominal:      Palpations: Abdomen is soft.      Tenderness: There is no abdominal tenderness.   Genitourinary:     Comments: Kidneys non palpable bilaterally  Bladder non palpable or tender  Neurological:      Mental Status: He is alert.          Last Recorded Vitals  There were no vitals taken for this visit.         Assessment/Plan   Assessment & Plan  Elevated PSA        Jovany Nielson MD

## 2024-10-22 NOTE — ANESTHESIA PREPROCEDURE EVALUATION
Patient: Raheem Hilton    Procedure Information       Anesthesia Start Date/Time: 10/22/24 0954    Procedures:       Biopsy Prostate with Navigation      Ultrasonography Transrectal Prostate      Ultrasound guidance for prostate fusion bx    Location: [facility] OR 01 / Virtual [facility] OR    Surgeons: Jovany Nielson MD            Relevant Problems   Neuro   (+) Multiple sclerosis (Multi)      /Renal   (+) UTI (urinary tract infection)   (+) Urinary tract infection with hematuria, site unspecified      ID   (+) UTI (urinary tract infection)   (+) Urinary tract infection with hematuria, site unspecified       Clinical information reviewed:   Tobacco  Allergies  Meds   Med Hx  Surg Hx   Fam Hx  Soc Hx        NPO Detail:  NPO/Void Status  Carbohydrate Drink Given Prior to Surgery? : N  Date of Last Liquid: 10/21/24  Time of Last Liquid: 1700  Date of Last Solid: 10/21/24  Time of Last Solid: 1700  Last Intake Type: Clear fluids  Time of Last Void: 0000 (650ml clear , light urine emptied from heaton)         Physical Exam    Airway  Mallampati: I  TM distance: >3 FB     Cardiovascular   Rhythm: regular  Rate: normal     Dental   (+) upper dentures     Pulmonary - normal exam     Abdominal            Anesthesia Plan    History of general anesthesia?: yes  History of complications of general anesthesia?: no    ASA 3     MAC     The patient is not a current smoker.  Patient did not smoke on day of procedure.    intravenous induction   Anesthetic plan and risks discussed with patient.

## 2024-10-22 NOTE — ANESTHESIA POSTPROCEDURE EVALUATION
Patient: Raheem Hilton    Procedure Summary       Date: 10/22/24 Room / Location: [facility] OR 01 / Virtual [facility]    Anesthesia Start: 0954 Anesthesia Stop: 1018    Procedures:       Biopsy Prostate with Navigation      Ultrasonography Transrectal Prostate      Ultrasound guidance for prostate fusion bx Diagnosis:       Elevated PSA      (Elevated PSA [R97.20])    Surgeons: Jovany Nielson MD Responsible Provider: Jovany Tineo MD PhD    Anesthesia Type: MAC ASA Status: 3            Anesthesia Type: MAC    Vitals Value Taken Time   /93 10/22/24 1059   Temp 36.1 °C (97 °F) 10/22/24 1017   Pulse 80 10/22/24 1059   Resp 15 10/22/24 1059   SpO2 99 % 10/22/24 1059       Anesthesia Post Evaluation    Patient location during evaluation: PACU  Patient participation: complete - patient participated  Level of consciousness: awake and alert  Pain management: satisfactory to patient  Airway patency: patent  Cardiovascular status: hemodynamically stable  Respiratory status: spontaneous ventilation and unassisted  Hydration status: euvolemic  Postoperative Nausea and Vomiting: none        No notable events documented.

## 2024-10-27 ENCOUNTER — HOSPITAL ENCOUNTER (OUTPATIENT)
Dept: CARDIOLOGY | Facility: HOSPITAL | Age: 64
Discharge: HOME | End: 2024-10-27
Payer: MEDICARE

## 2024-10-27 ENCOUNTER — HOSPITAL ENCOUNTER (EMERGENCY)
Facility: HOSPITAL | Age: 64
Discharge: HOME | End: 2024-10-28
Attending: EMERGENCY MEDICINE
Payer: MEDICARE

## 2024-10-27 ENCOUNTER — APPOINTMENT (OUTPATIENT)
Dept: RADIOLOGY | Facility: HOSPITAL | Age: 64
End: 2024-10-27
Payer: MEDICARE

## 2024-10-27 DIAGNOSIS — N39.0 URINARY TRACT INFECTION WITHOUT HEMATURIA, SITE UNSPECIFIED: Primary | ICD-10-CM

## 2024-10-27 LAB
ALBUMIN SERPL BCP-MCNC: 4 G/DL (ref 3.4–5)
ALP SERPL-CCNC: 140 U/L (ref 33–136)
ALT SERPL W P-5'-P-CCNC: 24 U/L (ref 10–52)
ANION GAP SERPL CALC-SCNC: 12 MMOL/L (ref 10–20)
APPEARANCE UR: ABNORMAL
AST SERPL W P-5'-P-CCNC: 22 U/L (ref 9–39)
BACTERIA #/AREA URNS AUTO: ABNORMAL /HPF
BASOPHILS # BLD AUTO: 0.09 X10*3/UL (ref 0–0.1)
BASOPHILS NFR BLD AUTO: 0.9 %
BILIRUB DIRECT SERPL-MCNC: 0.1 MG/DL (ref 0–0.3)
BILIRUB SERPL-MCNC: 0.3 MG/DL (ref 0–1.2)
BILIRUB UR STRIP.AUTO-MCNC: ABNORMAL MG/DL
BNP SERPL-MCNC: 24 PG/ML (ref 0–99)
BUN SERPL-MCNC: 8 MG/DL (ref 6–23)
CALCIUM SERPL-MCNC: 9 MG/DL (ref 8.6–10.3)
CARDIAC TROPONIN I PNL SERPL HS: 5 NG/L (ref 0–20)
CHLORIDE SERPL-SCNC: 94 MMOL/L (ref 98–107)
CO2 SERPL-SCNC: 24 MMOL/L (ref 21–32)
COLOR UR: ABNORMAL
CREAT SERPL-MCNC: 0.42 MG/DL (ref 0.5–1.3)
EGFRCR SERPLBLD CKD-EPI 2021: >90 ML/MIN/1.73M*2
EOSINOPHIL # BLD AUTO: 0.65 X10*3/UL (ref 0–0.7)
EOSINOPHIL NFR BLD AUTO: 6.2 %
ERYTHROCYTE [DISTWIDTH] IN BLOOD BY AUTOMATED COUNT: 13.2 % (ref 11.5–14.5)
GLUCOSE SERPL-MCNC: 98 MG/DL (ref 74–99)
GLUCOSE UR STRIP.AUTO-MCNC: NORMAL MG/DL
HCT VFR BLD AUTO: 42.2 % (ref 41–52)
HGB BLD-MCNC: 13.9 G/DL (ref 13.5–17.5)
IMM GRANULOCYTES # BLD AUTO: 0.04 X10*3/UL (ref 0–0.7)
IMM GRANULOCYTES NFR BLD AUTO: 0.4 % (ref 0–0.9)
KETONES UR STRIP.AUTO-MCNC: NEGATIVE MG/DL
LACTATE SERPL-SCNC: 0.9 MMOL/L (ref 0.4–2)
LEUKOCYTE ESTERASE UR QL STRIP.AUTO: ABNORMAL
LIPASE SERPL-CCNC: 17 U/L (ref 9–82)
LYMPHOCYTES # BLD AUTO: 2.41 X10*3/UL (ref 1.2–4.8)
LYMPHOCYTES NFR BLD AUTO: 23 %
MCH RBC QN AUTO: 30.7 PG (ref 26–34)
MCHC RBC AUTO-ENTMCNC: 32.9 G/DL (ref 32–36)
MCV RBC AUTO: 93 FL (ref 80–100)
MONOCYTES # BLD AUTO: 1.51 X10*3/UL (ref 0.1–1)
MONOCYTES NFR BLD AUTO: 14.4 %
MUCOUS THREADS #/AREA URNS AUTO: ABNORMAL /LPF
NEUTROPHILS # BLD AUTO: 5.77 X10*3/UL (ref 1.2–7.7)
NEUTROPHILS NFR BLD AUTO: 55.1 %
NITRITE UR QL STRIP.AUTO: ABNORMAL
NRBC BLD-RTO: 0 /100 WBCS (ref 0–0)
PH UR STRIP.AUTO: 7 [PH]
PLATELET # BLD AUTO: 352 X10*3/UL (ref 150–450)
POTASSIUM SERPL-SCNC: 3.7 MMOL/L (ref 3.5–5.3)
PROT SERPL-MCNC: 6.9 G/DL (ref 6.4–8.2)
PROT UR STRIP.AUTO-MCNC: ABNORMAL MG/DL
RBC # BLD AUTO: 4.53 X10*6/UL (ref 4.5–5.9)
RBC # UR STRIP.AUTO: NEGATIVE /UL
RBC #/AREA URNS AUTO: ABNORMAL /HPF
SODIUM SERPL-SCNC: 126 MMOL/L (ref 136–145)
SP GR UR STRIP.AUTO: 1.01
UROBILINOGEN UR STRIP.AUTO-MCNC: ABNORMAL MG/DL
WBC # BLD AUTO: 10.5 X10*3/UL (ref 4.4–11.3)
WBC #/AREA URNS AUTO: >50 /HPF
WBC CLUMPS #/AREA URNS AUTO: ABNORMAL /HPF

## 2024-10-27 PROCEDURE — 74177 CT ABD & PELVIS W/CONTRAST: CPT | Performed by: STUDENT IN AN ORGANIZED HEALTH CARE EDUCATION/TRAINING PROGRAM

## 2024-10-27 PROCEDURE — 96361 HYDRATE IV INFUSION ADD-ON: CPT

## 2024-10-27 PROCEDURE — 2550000001 HC RX 255 CONTRASTS: Performed by: EMERGENCY MEDICINE

## 2024-10-27 PROCEDURE — 94664 DEMO&/EVAL PT USE INHALER: CPT | Mod: 59

## 2024-10-27 PROCEDURE — 85025 COMPLETE CBC W/AUTO DIFF WBC: CPT | Performed by: EMERGENCY MEDICINE

## 2024-10-27 PROCEDURE — 93005 ELECTROCARDIOGRAM TRACING: CPT

## 2024-10-27 PROCEDURE — 82248 BILIRUBIN DIRECT: CPT | Performed by: EMERGENCY MEDICINE

## 2024-10-27 PROCEDURE — 84484 ASSAY OF TROPONIN QUANT: CPT | Performed by: EMERGENCY MEDICINE

## 2024-10-27 PROCEDURE — 9420000001 HC RT PATIENT EDUCATION 5 MIN

## 2024-10-27 PROCEDURE — 2500000004 HC RX 250 GENERAL PHARMACY W/ HCPCS (ALT 636 FOR OP/ED): Performed by: EMERGENCY MEDICINE

## 2024-10-27 PROCEDURE — 94762 N-INVAS EAR/PLS OXIMTRY CONT: CPT

## 2024-10-27 PROCEDURE — 87086 URINE CULTURE/COLONY COUNT: CPT | Mod: SAMLAB | Performed by: EMERGENCY MEDICINE

## 2024-10-27 PROCEDURE — 94640 AIRWAY INHALATION TREATMENT: CPT

## 2024-10-27 PROCEDURE — 96367 TX/PROPH/DG ADDL SEQ IV INF: CPT

## 2024-10-27 PROCEDURE — 83690 ASSAY OF LIPASE: CPT | Performed by: EMERGENCY MEDICINE

## 2024-10-27 PROCEDURE — 83880 ASSAY OF NATRIURETIC PEPTIDE: CPT | Performed by: EMERGENCY MEDICINE

## 2024-10-27 PROCEDURE — 71260 CT THORAX DX C+: CPT | Performed by: STUDENT IN AN ORGANIZED HEALTH CARE EDUCATION/TRAINING PROGRAM

## 2024-10-27 PROCEDURE — 2500000002 HC RX 250 W HCPCS SELF ADMINISTERED DRUGS (ALT 637 FOR MEDICARE OP, ALT 636 FOR OP/ED): Mod: MUE | Performed by: EMERGENCY MEDICINE

## 2024-10-27 PROCEDURE — 36415 COLL VENOUS BLD VENIPUNCTURE: CPT | Performed by: EMERGENCY MEDICINE

## 2024-10-27 PROCEDURE — 96375 TX/PRO/DX INJ NEW DRUG ADDON: CPT | Mod: 59

## 2024-10-27 PROCEDURE — 81001 URINALYSIS AUTO W/SCOPE: CPT | Performed by: EMERGENCY MEDICINE

## 2024-10-27 PROCEDURE — 83605 ASSAY OF LACTIC ACID: CPT | Performed by: EMERGENCY MEDICINE

## 2024-10-27 PROCEDURE — 71260 CT THORAX DX C+: CPT

## 2024-10-27 PROCEDURE — 80053 COMPREHEN METABOLIC PANEL: CPT | Performed by: EMERGENCY MEDICINE

## 2024-10-27 PROCEDURE — 99285 EMERGENCY DEPT VISIT HI MDM: CPT | Mod: 25

## 2024-10-27 PROCEDURE — 96365 THER/PROPH/DIAG IV INF INIT: CPT | Mod: 59

## 2024-10-27 RX ORDER — CIPROFLOXACIN 500 MG/1
500 TABLET ORAL 2 TIMES DAILY
Qty: 14 TABLET | Refills: 0 | Status: SHIPPED | OUTPATIENT
Start: 2024-10-27 | End: 2024-11-03

## 2024-10-27 RX ORDER — CEFTRIAXONE 1 G/50ML
1 INJECTION, SOLUTION INTRAVENOUS ONCE
Status: COMPLETED | OUTPATIENT
Start: 2024-10-27 | End: 2024-10-27

## 2024-10-27 RX ORDER — IPRATROPIUM BROMIDE AND ALBUTEROL SULFATE 2.5; .5 MG/3ML; MG/3ML
3 SOLUTION RESPIRATORY (INHALATION) ONCE
Status: COMPLETED | OUTPATIENT
Start: 2024-10-27 | End: 2024-10-27

## 2024-10-27 RX ORDER — MORPHINE SULFATE 4 MG/ML
4 INJECTION, SOLUTION INTRAMUSCULAR; INTRAVENOUS ONCE
Status: COMPLETED | OUTPATIENT
Start: 2024-10-27 | End: 2024-10-27

## 2024-10-27 RX ORDER — ONDANSETRON HYDROCHLORIDE 2 MG/ML
4 INJECTION, SOLUTION INTRAVENOUS ONCE
Status: COMPLETED | OUTPATIENT
Start: 2024-10-27 | End: 2024-10-27

## 2024-10-27 RX ORDER — CIPROFLOXACIN 2 MG/ML
400 INJECTION, SOLUTION INTRAVENOUS ONCE
Status: COMPLETED | OUTPATIENT
Start: 2024-10-27 | End: 2024-10-28

## 2024-10-27 ASSESSMENT — PAIN DESCRIPTION - PROGRESSION: CLINICAL_PROGRESSION: GRADUALLY IMPROVING

## 2024-10-27 ASSESSMENT — PAIN DESCRIPTION - ORIENTATION
ORIENTATION: MID
ORIENTATION: MID

## 2024-10-27 ASSESSMENT — PAIN DESCRIPTION - LOCATION
LOCATION: ABDOMEN
LOCATION: ABDOMEN

## 2024-10-27 ASSESSMENT — PAIN SCALES - GENERAL
PAINLEVEL_OUTOF10: 6
PAINLEVEL_OUTOF10: 0 - NO PAIN
PAINLEVEL_OUTOF10: 4
PAINLEVEL_OUTOF10: 6

## 2024-10-27 ASSESSMENT — PAIN DESCRIPTION - ONSET: ONSET: ONGOING

## 2024-10-27 ASSESSMENT — PAIN - FUNCTIONAL ASSESSMENT
PAIN_FUNCTIONAL_ASSESSMENT: 0-10
PAIN_FUNCTIONAL_ASSESSMENT: 0-10

## 2024-10-27 ASSESSMENT — PAIN DESCRIPTION - FREQUENCY: FREQUENCY: CONSTANT/CONTINUOUS

## 2024-10-27 ASSESSMENT — PAIN DESCRIPTION - PAIN TYPE
TYPE: ACUTE PAIN
TYPE: ACUTE PAIN

## 2024-10-28 VITALS
HEIGHT: 73 IN | WEIGHT: 175 LBS | OXYGEN SATURATION: 94 % | DIASTOLIC BLOOD PRESSURE: 68 MMHG | RESPIRATION RATE: 14 BRPM | HEART RATE: 78 BPM | BODY MASS INDEX: 23.19 KG/M2 | SYSTOLIC BLOOD PRESSURE: 109 MMHG | TEMPERATURE: 98.2 F

## 2024-10-28 LAB — HOLD SPECIMEN: NORMAL

## 2024-10-30 LAB
BACTERIA UR CULT: ABNORMAL
BACTERIA UR CULT: ABNORMAL

## 2024-10-31 ENCOUNTER — TELEPHONE (OUTPATIENT)
Dept: PHARMACY | Facility: HOSPITAL | Age: 64
End: 2024-10-31
Payer: MEDICARE

## 2024-11-01 LAB
ATRIAL RATE: 82 BPM
P AXIS: 56 DEGREES
P OFFSET: 159 MS
P ONSET: 103 MS
PR INTERVAL: 236 MS
Q ONSET: 221 MS
QRS COUNT: 14 BEATS
QRS DURATION: 98 MS
QT INTERVAL: 368 MS
QTC CALCULATION(BAZETT): 429 MS
QTC FREDERICIA: 408 MS
R AXIS: 40 DEGREES
T AXIS: 8 DEGREES
T OFFSET: 405 MS
VENTRICULAR RATE: 82 BPM

## 2024-12-30 PROBLEM — Z78.9 NURSING HOME RESIDENT: Status: ACTIVE | Noted: 2024-12-30

## 2025-01-19 ENCOUNTER — HOSPITAL ENCOUNTER (EMERGENCY)
Facility: HOSPITAL | Age: 65
Discharge: HOME | End: 2025-01-19
Attending: EMERGENCY MEDICINE
Payer: MEDICARE

## 2025-01-19 VITALS
HEART RATE: 90 BPM | HEIGHT: 74 IN | TEMPERATURE: 100.1 F | DIASTOLIC BLOOD PRESSURE: 62 MMHG | WEIGHT: 177 LBS | RESPIRATION RATE: 16 BRPM | SYSTOLIC BLOOD PRESSURE: 95 MMHG | OXYGEN SATURATION: 92 % | BODY MASS INDEX: 22.72 KG/M2

## 2025-01-19 DIAGNOSIS — A49.9 UTI (URINARY TRACT INFECTION), BACTERIAL: Primary | ICD-10-CM

## 2025-01-19 DIAGNOSIS — N39.0 UTI (URINARY TRACT INFECTION), BACTERIAL: Primary | ICD-10-CM

## 2025-01-19 LAB
ALBUMIN SERPL BCP-MCNC: 4 G/DL (ref 3.4–5)
ALP SERPL-CCNC: 115 U/L (ref 33–136)
ALT SERPL W P-5'-P-CCNC: 20 U/L (ref 10–52)
AMORPH CRY #/AREA UR COMP ASSIST: ABNORMAL /HPF
ANION GAP SERPL CALC-SCNC: 14 MMOL/L (ref 10–20)
APPEARANCE UR: ABNORMAL
AST SERPL W P-5'-P-CCNC: 18 U/L (ref 9–39)
BACTERIA #/AREA URNS AUTO: ABNORMAL /HPF
BASOPHILS # BLD AUTO: 0.1 X10*3/UL (ref 0–0.1)
BASOPHILS NFR BLD AUTO: 0.4 %
BILIRUB SERPL-MCNC: 1 MG/DL (ref 0–1.2)
BILIRUB UR STRIP.AUTO-MCNC: NEGATIVE MG/DL
BUN SERPL-MCNC: 11 MG/DL (ref 6–23)
CALCIUM SERPL-MCNC: 9 MG/DL (ref 8.6–10.3)
CHLORIDE SERPL-SCNC: 96 MMOL/L (ref 98–107)
CO2 SERPL-SCNC: 23 MMOL/L (ref 21–32)
COLOR UR: YELLOW
CREAT SERPL-MCNC: 0.67 MG/DL (ref 0.5–1.3)
EGFRCR SERPLBLD CKD-EPI 2021: >90 ML/MIN/1.73M*2
EOSINOPHIL # BLD AUTO: 0.15 X10*3/UL (ref 0–0.7)
EOSINOPHIL NFR BLD AUTO: 0.6 %
ERYTHROCYTE [DISTWIDTH] IN BLOOD BY AUTOMATED COUNT: 13.3 % (ref 11.5–14.5)
GLUCOSE SERPL-MCNC: 92 MG/DL (ref 74–99)
GLUCOSE UR STRIP.AUTO-MCNC: NORMAL MG/DL
HCT VFR BLD AUTO: 44.6 % (ref 41–52)
HGB BLD-MCNC: 14.5 G/DL (ref 13.5–17.5)
IMM GRANULOCYTES # BLD AUTO: 0.14 X10*3/UL (ref 0–0.7)
IMM GRANULOCYTES NFR BLD AUTO: 0.5 % (ref 0–0.9)
KETONES UR STRIP.AUTO-MCNC: NEGATIVE MG/DL
LACTATE SERPL-SCNC: 1 MMOL/L (ref 0.4–2)
LEUKOCYTE ESTERASE UR QL STRIP.AUTO: ABNORMAL
LYMPHOCYTES # BLD AUTO: 2.06 X10*3/UL (ref 1.2–4.8)
LYMPHOCYTES NFR BLD AUTO: 7.9 %
MAGNESIUM SERPL-MCNC: 1.93 MG/DL (ref 1.6–2.4)
MCH RBC QN AUTO: 30.8 PG (ref 26–34)
MCHC RBC AUTO-ENTMCNC: 32.5 G/DL (ref 32–36)
MCV RBC AUTO: 95 FL (ref 80–100)
MONOCYTES # BLD AUTO: 2.74 X10*3/UL (ref 0.1–1)
MONOCYTES NFR BLD AUTO: 10.5 %
MUCOUS THREADS #/AREA URNS AUTO: ABNORMAL /LPF
NEUTROPHILS # BLD AUTO: 20.89 X10*3/UL (ref 1.2–7.7)
NEUTROPHILS NFR BLD AUTO: 80.1 %
NITRITE UR QL STRIP.AUTO: NEGATIVE
NRBC BLD-RTO: 0 /100 WBCS (ref 0–0)
PH UR STRIP.AUTO: 7 [PH]
PLATELET # BLD AUTO: 279 X10*3/UL (ref 150–450)
POTASSIUM SERPL-SCNC: 3.8 MMOL/L (ref 3.5–5.3)
PROT SERPL-MCNC: 7.4 G/DL (ref 6.4–8.2)
PROT UR STRIP.AUTO-MCNC: ABNORMAL MG/DL
RBC # BLD AUTO: 4.71 X10*6/UL (ref 4.5–5.9)
RBC # UR STRIP.AUTO: ABNORMAL /UL
RBC #/AREA URNS AUTO: ABNORMAL /HPF
SODIUM SERPL-SCNC: 129 MMOL/L (ref 136–145)
SP GR UR STRIP.AUTO: 1.02
UROBILINOGEN UR STRIP.AUTO-MCNC: NORMAL MG/DL
WBC # BLD AUTO: 26.1 X10*3/UL (ref 4.4–11.3)
WBC #/AREA URNS AUTO: >50 /HPF
WBC CLUMPS #/AREA URNS AUTO: ABNORMAL /HPF

## 2025-01-19 PROCEDURE — 81001 URINALYSIS AUTO W/SCOPE: CPT | Performed by: EMERGENCY MEDICINE

## 2025-01-19 PROCEDURE — 2500000001 HC RX 250 WO HCPCS SELF ADMINISTERED DRUGS (ALT 637 FOR MEDICARE OP): Performed by: EMERGENCY MEDICINE

## 2025-01-19 PROCEDURE — 99284 EMERGENCY DEPT VISIT MOD MDM: CPT | Performed by: EMERGENCY MEDICINE

## 2025-01-19 PROCEDURE — 2500000004 HC RX 250 GENERAL PHARMACY W/ HCPCS (ALT 636 FOR OP/ED): Performed by: EMERGENCY MEDICINE

## 2025-01-19 PROCEDURE — 83605 ASSAY OF LACTIC ACID: CPT | Performed by: EMERGENCY MEDICINE

## 2025-01-19 PROCEDURE — 80053 COMPREHEN METABOLIC PANEL: CPT | Performed by: EMERGENCY MEDICINE

## 2025-01-19 PROCEDURE — 36415 COLL VENOUS BLD VENIPUNCTURE: CPT | Performed by: EMERGENCY MEDICINE

## 2025-01-19 PROCEDURE — 87086 URINE CULTURE/COLONY COUNT: CPT | Mod: SAMLAB | Performed by: EMERGENCY MEDICINE

## 2025-01-19 PROCEDURE — 96365 THER/PROPH/DIAG IV INF INIT: CPT

## 2025-01-19 PROCEDURE — 85025 COMPLETE CBC W/AUTO DIFF WBC: CPT | Performed by: EMERGENCY MEDICINE

## 2025-01-19 PROCEDURE — 83735 ASSAY OF MAGNESIUM: CPT | Performed by: EMERGENCY MEDICINE

## 2025-01-19 RX ORDER — AMOXICILLIN AND CLAVULANATE POTASSIUM 875; 125 MG/1; MG/1
1 TABLET, FILM COATED ORAL EVERY 12 HOURS
Qty: 14 TABLET | Refills: 0 | Status: SHIPPED | OUTPATIENT
Start: 2025-01-19 | End: 2025-01-26

## 2025-01-19 RX ORDER — ACETAMINOPHEN 325 MG/1
650 TABLET ORAL ONCE
Status: COMPLETED | OUTPATIENT
Start: 2025-01-19 | End: 2025-01-19

## 2025-01-19 RX ADMIN — ACETAMINOPHEN 650 MG: 325 TABLET ORAL at 01:42

## 2025-01-19 RX ADMIN — PIPERACILLIN SODIUM AND TAZOBACTAM SODIUM 3.38 G: 3; .375 INJECTION, SOLUTION INTRAVENOUS at 01:28

## 2025-01-19 ASSESSMENT — PAIN - FUNCTIONAL ASSESSMENT: PAIN_FUNCTIONAL_ASSESSMENT: 0-10

## 2025-01-19 ASSESSMENT — PAIN SCALES - GENERAL: PAINLEVEL_OUTOF10: 0 - NO PAIN

## 2025-01-19 NOTE — ED PROVIDER NOTES
"HPI   Chief Complaint   Patient presents with    Pt.Believes he has UTI     Pt. To ED from [place] with chronic Suprapubic Catheter for neurogenic bladder, reports he feels as if he has a UTI. States his hands get \"numb\" when he's getting a UTI.Denies other symptoms at this time. Pt. A&Ox4       64-year-old male with neurogenic bladder presents with some numbness of upper extremities.  Patient states when he gets the symptoms he has a urinary tract infection.  Patient states symptoms started several hours prior to arrival.  Patient is in no acute distress and basically at his baseline.      History provided by:  Patient          Patient History   Past Medical History:   Diagnosis Date    Anemia     Bowel perforation (Multi)     Buttock wound, unspecified laterality, subsequent encounter     COPD (chronic obstructive pulmonary disease) (Multi)     Extended spectrum beta lactamase (ESBL) resistance     GERD (gastroesophageal reflux disease)     Hyperlipidemia     Hypertension     Hypo-osmolality and hyponatremia     Insomnia     Multiple sclerosis (Multi)     Neurogenic bladder     Neuromuscular dysfunction of bladder     Personal history of transient ischemic attack (TIA), and cerebral infarction without residual deficits 08/06/2020    History of embolic stroke    Pulmonary embolism     Sepsis with encephalopathy (Multi)     Septic shock (Multi)     Simple febrile convulsions (Multi)     Status post administration of tPA (rtPA) in a different facility within the last 24 hours prior to admission to current facility 08/06/2020    Tissue plasminogen activator (tPA) administered at other facility within 24 hours before current admission    UTI (urinary tract infection)      Past Surgical History:   Procedure Laterality Date    MR HEAD ANGIO WO IV CONTRAST  6/29/2020    MR HEAD ANGIO WO IV CONTRAST 6/29/2020 [facility] CLINICAL LEGACY    MR NECK ANGIO WO IV CONTRAST  6/29/2020    MR NECK ANGIO WO IV CONTRAST 6/29/2020 [facility] " CLINICAL LEGACY     No family history on file.  Social History     Tobacco Use    Smoking status: Never    Smokeless tobacco: Never   Substance Use Topics    Alcohol use: Not on file    Drug use: Not on file       Physical Exam   ED Triage Vitals [01/19/25 0010]   Temperature Heart Rate Respirations BP   37.3 °C (99.2 °F) (!) 102 18 157/85      Pulse Ox Temp Source Heart Rate Source Patient Position   95 % Axillary Monitor --      BP Location FiO2 (%)     -- --       Physical Exam  Vitals and nursing note reviewed.   Constitutional:       General: He is not in acute distress.     Appearance: He is well-developed.   HENT:      Head: Normocephalic and atraumatic.   Eyes:      Conjunctiva/sclera: Conjunctivae normal.   Cardiovascular:      Rate and Rhythm: Normal rate and regular rhythm.      Heart sounds: No murmur heard.  Pulmonary:      Effort: Pulmonary effort is normal. No respiratory distress.      Breath sounds: Normal breath sounds.   Abdominal:      Palpations: Abdomen is soft.      Tenderness: There is no abdominal tenderness.   Genitourinary:     Comments: Suprapubic cath  Musculoskeletal:         General: No swelling.      Cervical back: Neck supple.   Skin:     General: Skin is warm and dry.      Capillary Refill: Capillary refill takes less than 2 seconds.   Neurological:      Mental Status: He is alert.      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         Labs Reviewed   CBC WITH AUTO DIFFERENTIAL - Abnormal       Result Value    WBC 26.1 (*)     nRBC 0.0      RBC 4.71      Hemoglobin 14.5      Hematocrit 44.6      MCV 95      MCH 30.8      MCHC 32.5      RDW 13.3      Platelets 279      Neutrophils % 80.1      Immature Granulocytes %, Automated 0.5      Lymphocytes % 7.9      Monocytes % 10.5      Eosinophils % 0.6      Basophils % 0.4      Neutrophils Absolute 20.89 (*)     Immature Granulocytes Absolute, Automated 0.14      Lymphocytes  Absolute 2.06      Monocytes Absolute 2.74 (*)     Eosinophils Absolute 0.15      Basophils Absolute 0.10     COMPREHENSIVE METABOLIC PANEL - Abnormal    Glucose 92      Sodium 129 (*)     Potassium 3.8      Chloride 96 (*)     Bicarbonate 23      Anion Gap 14      Urea Nitrogen 11      Creatinine 0.67      eGFR >90      Calcium 9.0      Albumin 4.0      Alkaline Phosphatase 115      Total Protein 7.4      AST 18      Bilirubin, Total 1.0      ALT 20     URINALYSIS WITH REFLEX CULTURE AND MICROSCOPIC - Abnormal    Color, Urine Yellow      Appearance, Urine Turbid (*)     Specific Gravity, Urine 1.020      pH, Urine 7.0      Protein, Urine 100 (2+) (*)     Glucose, Urine Normal      Blood, Urine 0.1 (1+) (*)     Ketones, Urine NEGATIVE      Bilirubin, Urine NEGATIVE      Urobilinogen, Urine Normal      Nitrite, Urine NEGATIVE      Leukocyte Esterase, Urine 500 Silvestre/uL (*)    MICROSCOPIC ONLY, URINE - Abnormal    WBC, Urine >50 (*)     WBC Clumps, Urine OCCASIONAL      RBC, Urine 11-20 (*)     Bacteria, Urine 1+ (*)     Mucus, Urine FEW      Amorphous Crystals, Urine 1+     MAGNESIUM - Normal    Magnesium 1.93     LACTATE - Normal    Lactate 1.0      Narrative:     Venipuncture immediately after or during the administration of Metamizole may lead to falsely low results. Testing should be performed immediately prior to Metamizole dosing.   URINE CULTURE   URINALYSIS WITH REFLEX CULTURE AND MICROSCOPIC    Narrative:     The following orders were created for panel order Urinalysis with Reflex Culture and Microscopic.  Procedure                               Abnormality         Status                     ---------                               -----------         ------                     Urinalysis with Reflex C...[[id number]]  Abnormal            Final result               Extra Urine Gray Tube[[id number]]                                                         Please view results for these tests on the individual orders.    EXTRA URINE GRAY TUBE      ED Course & MDM   Diagnoses as of 01/19/25 0119   UTI (urinary tract infection), bacterial                 No data recorded     [place] Coma Scale Score: 15 (01/19/25 0011 : Matthew Jasmine, REEMA)                           Medical Decision Making  1 catheter care and possible change of suprapubic cath for the next 14 days 2 take antibiotics as directed 3 follow-up with family medical doctor in 2 to 3 days if no improvement return to ED.        Procedure  Procedures     Aurelio Hagen,   01/19/25 0120

## 2025-01-20 LAB — BACTERIA UR CULT: NORMAL

## 2025-02-24 ENCOUNTER — HOSPITAL ENCOUNTER (OUTPATIENT)
Dept: RADIOLOGY | Facility: HOSPITAL | Age: 65
Discharge: HOME | End: 2025-02-24
Payer: MEDICARE

## 2025-02-24 DIAGNOSIS — E04.1 NONTOXIC SINGLE THYROID NODULE: ICD-10-CM

## 2025-02-24 PROCEDURE — 76536 US EXAM OF HEAD AND NECK: CPT | Performed by: RADIOLOGY

## 2025-02-24 PROCEDURE — 76536 US EXAM OF HEAD AND NECK: CPT

## 2025-03-02 ENCOUNTER — HOSPITAL ENCOUNTER (INPATIENT)
Facility: HOSPITAL | Age: 65
DRG: 390 | End: 2025-03-02
Attending: EMERGENCY MEDICINE | Admitting: STUDENT IN AN ORGANIZED HEALTH CARE EDUCATION/TRAINING PROGRAM
Payer: MEDICARE

## 2025-03-02 ENCOUNTER — APPOINTMENT (OUTPATIENT)
Dept: RADIOLOGY | Facility: HOSPITAL | Age: 65
DRG: 390 | End: 2025-03-02
Payer: MEDICARE

## 2025-03-02 VITALS
TEMPERATURE: 97.9 F | BODY MASS INDEX: 22.88 KG/M2 | HEIGHT: 73 IN | DIASTOLIC BLOOD PRESSURE: 84 MMHG | HEART RATE: 94 BPM | WEIGHT: 172.62 LBS | RESPIRATION RATE: 18 BRPM | SYSTOLIC BLOOD PRESSURE: 129 MMHG | OXYGEN SATURATION: 96 %

## 2025-03-02 DIAGNOSIS — G35 MULTIPLE SCLEROSIS (MULTI): ICD-10-CM

## 2025-03-02 DIAGNOSIS — K56.7 ILEUS (MULTI): Primary | ICD-10-CM

## 2025-03-02 LAB
ALBUMIN SERPL BCP-MCNC: 4 G/DL (ref 3.4–5)
ALP SERPL-CCNC: 110 U/L (ref 33–136)
ALT SERPL W P-5'-P-CCNC: 22 U/L (ref 10–52)
ANION GAP SERPL CALC-SCNC: 10 MMOL/L (ref 10–20)
APPEARANCE UR: ABNORMAL
APTT PPP: 37 SECONDS (ref 26–36)
AST SERPL W P-5'-P-CCNC: 23 U/L (ref 9–39)
BACTERIA #/AREA URNS AUTO: ABNORMAL /HPF
BASOPHILS # BLD AUTO: 0.07 X10*3/UL (ref 0–0.1)
BASOPHILS NFR BLD AUTO: 0.8 %
BILIRUB SERPL-MCNC: 0.6 MG/DL (ref 0–1.2)
BILIRUB UR STRIP.AUTO-MCNC: NEGATIVE MG/DL
BUN SERPL-MCNC: 8 MG/DL (ref 6–23)
CALCIUM SERPL-MCNC: 8.9 MG/DL (ref 8.6–10.3)
CHLORIDE SERPL-SCNC: 98 MMOL/L (ref 98–107)
CO2 SERPL-SCNC: 27 MMOL/L (ref 21–32)
COLOR UR: ABNORMAL
CREAT SERPL-MCNC: 0.55 MG/DL (ref 0.5–1.3)
EGFRCR SERPLBLD CKD-EPI 2021: >90 ML/MIN/1.73M*2
EOSINOPHIL # BLD AUTO: 0.61 X10*3/UL (ref 0–0.7)
EOSINOPHIL NFR BLD AUTO: 6.7 %
ERYTHROCYTE [DISTWIDTH] IN BLOOD BY AUTOMATED COUNT: 13.3 % (ref 11.5–14.5)
GLUCOSE SERPL-MCNC: 93 MG/DL (ref 74–99)
GLUCOSE UR STRIP.AUTO-MCNC: NORMAL MG/DL
HCT VFR BLD AUTO: 42.8 % (ref 41–52)
HGB BLD-MCNC: 14.1 G/DL (ref 13.5–17.5)
HOLD SPECIMEN: NORMAL
IMM GRANULOCYTES # BLD AUTO: 0.01 X10*3/UL (ref 0–0.7)
IMM GRANULOCYTES NFR BLD AUTO: 0.1 % (ref 0–0.9)
INR PPP: 1.1 (ref 0.9–1.1)
KETONES UR STRIP.AUTO-MCNC: NEGATIVE MG/DL
LACTATE SERPL-SCNC: 1 MMOL/L (ref 0.4–2)
LEUKOCYTE ESTERASE UR QL STRIP.AUTO: ABNORMAL
LIPASE SERPL-CCNC: 14 U/L (ref 9–82)
LYMPHOCYTES # BLD AUTO: 1.49 X10*3/UL (ref 1.2–4.8)
LYMPHOCYTES NFR BLD AUTO: 16.3 %
MCH RBC QN AUTO: 30.4 PG (ref 26–34)
MCHC RBC AUTO-ENTMCNC: 32.9 G/DL (ref 32–36)
MCV RBC AUTO: 92 FL (ref 80–100)
MONOCYTES # BLD AUTO: 0.9 X10*3/UL (ref 0.1–1)
MONOCYTES NFR BLD AUTO: 9.9 %
NEUTROPHILS # BLD AUTO: 6.05 X10*3/UL (ref 1.2–7.7)
NEUTROPHILS NFR BLD AUTO: 66.2 %
NITRITE UR QL STRIP.AUTO: ABNORMAL
NRBC BLD-RTO: 0 /100 WBCS (ref 0–0)
PH UR STRIP.AUTO: 7.5 [PH]
PLATELET # BLD AUTO: 311 X10*3/UL (ref 150–450)
POTASSIUM SERPL-SCNC: 4 MMOL/L (ref 3.5–5.3)
PROT SERPL-MCNC: 7 G/DL (ref 6.4–8.2)
PROT UR STRIP.AUTO-MCNC: ABNORMAL MG/DL
PROTHROMBIN TIME: 12 SECONDS (ref 9.8–12.4)
RBC # BLD AUTO: 4.64 X10*6/UL (ref 4.5–5.9)
RBC # UR STRIP.AUTO: NEGATIVE MG/DL
RBC #/AREA URNS AUTO: ABNORMAL /HPF
SODIUM SERPL-SCNC: 131 MMOL/L (ref 136–145)
SP GR UR STRIP.AUTO: 1.01
UROBILINOGEN UR STRIP.AUTO-MCNC: NORMAL MG/DL
WBC # BLD AUTO: 9.1 X10*3/UL (ref 4.4–11.3)
WBC #/AREA URNS AUTO: >50 /HPF
WBC CLUMPS #/AREA URNS AUTO: ABNORMAL /HPF

## 2025-03-02 PROCEDURE — 83690 ASSAY OF LIPASE: CPT | Performed by: EMERGENCY MEDICINE

## 2025-03-02 PROCEDURE — 2500000004 HC RX 250 GENERAL PHARMACY W/ HCPCS (ALT 636 FOR OP/ED): Performed by: EMERGENCY MEDICINE

## 2025-03-02 PROCEDURE — 1200000002 HC GENERAL ROOM WITH TELEMETRY DAILY

## 2025-03-02 PROCEDURE — 99223 1ST HOSP IP/OBS HIGH 75: CPT | Performed by: STUDENT IN AN ORGANIZED HEALTH CARE EDUCATION/TRAINING PROGRAM

## 2025-03-02 PROCEDURE — 2500000005 HC RX 250 GENERAL PHARMACY W/O HCPCS: Performed by: STUDENT IN AN ORGANIZED HEALTH CARE EDUCATION/TRAINING PROGRAM

## 2025-03-02 PROCEDURE — 99222 1ST HOSP IP/OBS MODERATE 55: CPT | Performed by: SURGERY

## 2025-03-02 PROCEDURE — 2500000001 HC RX 250 WO HCPCS SELF ADMINISTERED DRUGS (ALT 637 FOR MEDICARE OP): Performed by: STUDENT IN AN ORGANIZED HEALTH CARE EDUCATION/TRAINING PROGRAM

## 2025-03-02 PROCEDURE — 2550000001 HC RX 255 CONTRASTS: Performed by: EMERGENCY MEDICINE

## 2025-03-02 PROCEDURE — 74018 RADEX ABDOMEN 1 VIEW: CPT

## 2025-03-02 PROCEDURE — 85730 THROMBOPLASTIN TIME PARTIAL: CPT | Performed by: EMERGENCY MEDICINE

## 2025-03-02 PROCEDURE — 87086 URINE CULTURE/COLONY COUNT: CPT | Mod: SAMLAB | Performed by: EMERGENCY MEDICINE

## 2025-03-02 PROCEDURE — 74177 CT ABD & PELVIS W/CONTRAST: CPT | Performed by: RADIOLOGY

## 2025-03-02 PROCEDURE — 99285 EMERGENCY DEPT VISIT HI MDM: CPT | Mod: 25 | Performed by: EMERGENCY MEDICINE

## 2025-03-02 PROCEDURE — 80053 COMPREHEN METABOLIC PANEL: CPT | Performed by: EMERGENCY MEDICINE

## 2025-03-02 PROCEDURE — 71045 X-RAY EXAM CHEST 1 VIEW: CPT | Performed by: STUDENT IN AN ORGANIZED HEALTH CARE EDUCATION/TRAINING PROGRAM

## 2025-03-02 PROCEDURE — 9420000001 HC RT PATIENT EDUCATION 5 MIN

## 2025-03-02 PROCEDURE — 96374 THER/PROPH/DIAG INJ IV PUSH: CPT

## 2025-03-02 PROCEDURE — 2500000002 HC RX 250 W HCPCS SELF ADMINISTERED DRUGS (ALT 637 FOR MEDICARE OP, ALT 636 FOR OP/ED): Performed by: STUDENT IN AN ORGANIZED HEALTH CARE EDUCATION/TRAINING PROGRAM

## 2025-03-02 PROCEDURE — 81001 URINALYSIS AUTO W/SCOPE: CPT | Performed by: EMERGENCY MEDICINE

## 2025-03-02 PROCEDURE — 85025 COMPLETE CBC W/AUTO DIFF WBC: CPT | Performed by: EMERGENCY MEDICINE

## 2025-03-02 PROCEDURE — 94664 DEMO&/EVAL PT USE INHALER: CPT

## 2025-03-02 PROCEDURE — 96375 TX/PRO/DX INJ NEW DRUG ADDON: CPT

## 2025-03-02 PROCEDURE — 2500000004 HC RX 250 GENERAL PHARMACY W/ HCPCS (ALT 636 FOR OP/ED): Performed by: STUDENT IN AN ORGANIZED HEALTH CARE EDUCATION/TRAINING PROGRAM

## 2025-03-02 PROCEDURE — 2500000002 HC RX 250 W HCPCS SELF ADMINISTERED DRUGS (ALT 637 FOR MEDICARE OP, ALT 636 FOR OP/ED)

## 2025-03-02 PROCEDURE — 94640 AIRWAY INHALATION TREATMENT: CPT

## 2025-03-02 PROCEDURE — 36415 COLL VENOUS BLD VENIPUNCTURE: CPT | Performed by: EMERGENCY MEDICINE

## 2025-03-02 PROCEDURE — 74177 CT ABD & PELVIS W/CONTRAST: CPT

## 2025-03-02 PROCEDURE — 85610 PROTHROMBIN TIME: CPT | Performed by: EMERGENCY MEDICINE

## 2025-03-02 PROCEDURE — 71045 X-RAY EXAM CHEST 1 VIEW: CPT

## 2025-03-02 PROCEDURE — 83605 ASSAY OF LACTIC ACID: CPT | Performed by: EMERGENCY MEDICINE

## 2025-03-02 RX ORDER — METOPROLOL SUCCINATE 25 MG/1
25 TABLET, EXTENDED RELEASE ORAL DAILY
Status: DISCONTINUED | OUTPATIENT
Start: 2025-03-02 | End: 2025-03-04 | Stop reason: HOSPADM

## 2025-03-02 RX ORDER — DOCUSATE SODIUM 100 MG/1
100 CAPSULE, LIQUID FILLED ORAL 2 TIMES DAILY
COMMUNITY
End: 2025-03-04 | Stop reason: HOSPADM

## 2025-03-02 RX ORDER — NEOSTIGMINE METHYLSULFATE 1 MG/ML
2 INJECTION INTRAVENOUS ONCE
Status: COMPLETED | OUTPATIENT
Start: 2025-03-02 | End: 2025-03-02

## 2025-03-02 RX ORDER — TALC
6 POWDER (GRAM) TOPICAL NIGHTLY PRN
Status: DISCONTINUED | OUTPATIENT
Start: 2025-03-02 | End: 2025-03-04 | Stop reason: HOSPADM

## 2025-03-02 RX ORDER — ALBUTEROL SULFATE 0.83 MG/ML
2.5 SOLUTION RESPIRATORY (INHALATION) EVERY 6 HOURS PRN
Status: DISCONTINUED | OUTPATIENT
Start: 2025-03-02 | End: 2025-03-02

## 2025-03-02 RX ORDER — METHIMAZOLE 5 MG/1
5 TABLET ORAL 3 TIMES DAILY
Status: DISCONTINUED | OUTPATIENT
Start: 2025-03-02 | End: 2025-03-04 | Stop reason: HOSPADM

## 2025-03-02 RX ORDER — ACETAMINOPHEN 650 MG/1
650 SUPPOSITORY RECTAL EVERY 4 HOURS PRN
Status: DISCONTINUED | OUTPATIENT
Start: 2025-03-02 | End: 2025-03-04 | Stop reason: HOSPADM

## 2025-03-02 RX ORDER — ACETAMINOPHEN 325 MG/1
650 TABLET ORAL EVERY 4 HOURS PRN
Status: DISCONTINUED | OUTPATIENT
Start: 2025-03-02 | End: 2025-03-04 | Stop reason: HOSPADM

## 2025-03-02 RX ORDER — CARBAMAZEPINE 200 MG/1
200 TABLET ORAL 2 TIMES DAILY
Status: DISCONTINUED | OUTPATIENT
Start: 2025-03-02 | End: 2025-03-04 | Stop reason: HOSPADM

## 2025-03-02 RX ORDER — MORPHINE SULFATE 4 MG/ML
4 INJECTION, SOLUTION INTRAMUSCULAR; INTRAVENOUS ONCE
Status: COMPLETED | OUTPATIENT
Start: 2025-03-02 | End: 2025-03-02

## 2025-03-02 RX ORDER — ONDANSETRON HYDROCHLORIDE 2 MG/ML
4 INJECTION, SOLUTION INTRAVENOUS ONCE
Status: COMPLETED | OUTPATIENT
Start: 2025-03-02 | End: 2025-03-02

## 2025-03-02 RX ORDER — POLYETHYLENE GLYCOL 3350 17 G/17G
17 POWDER, FOR SOLUTION ORAL DAILY
COMMUNITY

## 2025-03-02 RX ORDER — ACETAMINOPHEN 160 MG/5ML
650 SOLUTION ORAL EVERY 4 HOURS PRN
Status: DISCONTINUED | OUTPATIENT
Start: 2025-03-02 | End: 2025-03-04 | Stop reason: HOSPADM

## 2025-03-02 RX ORDER — BACLOFEN 10 MG/1
10 TABLET ORAL 3 TIMES DAILY
Status: DISCONTINUED | OUTPATIENT
Start: 2025-03-02 | End: 2025-03-04 | Stop reason: HOSPADM

## 2025-03-02 RX ORDER — POLYETHYLENE GLYCOL 3350 17 G/17G
17 POWDER, FOR SOLUTION ORAL DAILY PRN
Status: DISCONTINUED | OUTPATIENT
Start: 2025-03-02 | End: 2025-03-04 | Stop reason: HOSPADM

## 2025-03-02 RX ORDER — ATORVASTATIN CALCIUM 80 MG/1
80 TABLET, FILM COATED ORAL NIGHTLY
Status: DISCONTINUED | OUTPATIENT
Start: 2025-03-02 | End: 2025-03-04 | Stop reason: HOSPADM

## 2025-03-02 RX ORDER — GABAPENTIN 300 MG/1
600 CAPSULE ORAL 3 TIMES DAILY
Status: DISCONTINUED | OUTPATIENT
Start: 2025-03-02 | End: 2025-03-04 | Stop reason: HOSPADM

## 2025-03-02 RX ORDER — IPRATROPIUM BROMIDE AND ALBUTEROL SULFATE 2.5; .5 MG/3ML; MG/3ML
SOLUTION RESPIRATORY (INHALATION)
Status: COMPLETED
Start: 2025-03-02 | End: 2025-03-02

## 2025-03-02 RX ORDER — FONDAPARINUX SODIUM 2.5 MG/.5ML
2.5 INJECTION SUBCUTANEOUS EVERY 24 HOURS
Status: DISCONTINUED | OUTPATIENT
Start: 2025-03-02 | End: 2025-03-04 | Stop reason: HOSPADM

## 2025-03-02 RX ORDER — IPRATROPIUM BROMIDE AND ALBUTEROL SULFATE 2.5; .5 MG/3ML; MG/3ML
3 SOLUTION RESPIRATORY (INHALATION) EVERY 6 HOURS PRN
Status: DISCONTINUED | OUTPATIENT
Start: 2025-03-02 | End: 2025-03-04 | Stop reason: HOSPADM

## 2025-03-02 RX ORDER — ATROPINE SULFATE 0.1 MG/ML
1 INJECTION INTRAVENOUS AS NEEDED
Status: DISCONTINUED | OUTPATIENT
Start: 2025-03-02 | End: 2025-03-04 | Stop reason: HOSPADM

## 2025-03-02 RX ORDER — DEXTROSE MONOHYDRATE AND SODIUM CHLORIDE 5; .45 G/100ML; G/100ML
100 INJECTION, SOLUTION INTRAVENOUS CONTINUOUS
Status: DISCONTINUED | OUTPATIENT
Start: 2025-03-02 | End: 2025-03-03

## 2025-03-02 RX ADMIN — GABAPENTIN 600 MG: 300 CAPSULE ORAL at 20:50

## 2025-03-02 RX ADMIN — ONDANSETRON 4 MG: 2 INJECTION INTRAMUSCULAR; INTRAVENOUS at 09:38

## 2025-03-02 RX ADMIN — NEOSTIGMINE METHYLSULFATE 2 MG: 1 INJECTION INTRAVENOUS at 16:49

## 2025-03-02 RX ADMIN — CARBAMAZEPINE 200 MG: 200 TABLET ORAL at 20:50

## 2025-03-02 RX ADMIN — BACLOFEN 10 MG: 10 TABLET ORAL at 15:40

## 2025-03-02 RX ADMIN — IPRATROPIUM BROMIDE AND ALBUTEROL SULFATE 3 ML: 2.5; .5 SOLUTION RESPIRATORY (INHALATION) at 23:28

## 2025-03-02 RX ADMIN — Medication 3 L/MIN: at 23:28

## 2025-03-02 RX ADMIN — IOHEXOL 69 ML: 350 INJECTION, SOLUTION INTRAVENOUS at 10:01

## 2025-03-02 RX ADMIN — ATORVASTATIN CALCIUM 80 MG: 80 TABLET, FILM COATED ORAL at 20:50

## 2025-03-02 RX ADMIN — Medication 3 L/MIN: at 17:53

## 2025-03-02 RX ADMIN — IPRATROPIUM BROMIDE AND ALBUTEROL SULFATE 3 ML: 2.5; .5 SOLUTION RESPIRATORY (INHALATION) at 17:53

## 2025-03-02 RX ADMIN — METOPROLOL SUCCINATE 25 MG: 25 TABLET, EXTENDED RELEASE ORAL at 15:40

## 2025-03-02 RX ADMIN — METHIMAZOLE 5 MG: 5 TABLET ORAL at 15:40

## 2025-03-02 RX ADMIN — METHIMAZOLE 5 MG: 5 TABLET ORAL at 20:50

## 2025-03-02 RX ADMIN — DEXTROSE AND SODIUM CHLORIDE 100 ML/HR: 5; .45 INJECTION, SOLUTION INTRAVENOUS at 15:49

## 2025-03-02 RX ADMIN — MORPHINE SULFATE 4 MG: 4 INJECTION, SOLUTION INTRAMUSCULAR; INTRAVENOUS at 09:38

## 2025-03-02 RX ADMIN — GABAPENTIN 600 MG: 300 CAPSULE ORAL at 15:40

## 2025-03-02 RX ADMIN — CARBAMAZEPINE 200 MG: 200 TABLET ORAL at 15:41

## 2025-03-02 RX ADMIN — FONDAPARINUX SODIUM 2.5 MG: 2.5 INJECTION SUBCUTANEOUS at 15:40

## 2025-03-02 RX ADMIN — BACLOFEN 10 MG: 10 TABLET ORAL at 20:50

## 2025-03-02 SDOH — SOCIAL STABILITY: SOCIAL INSECURITY
WITHIN THE LAST YEAR, HAVE YOU BEEN KICKED, HIT, SLAPPED, OR OTHERWISE PHYSICALLY HURT BY YOUR PARTNER OR EX-PARTNER?: NO

## 2025-03-02 SDOH — HEALTH STABILITY: MENTAL HEALTH: HOW MANY DRINKS CONTAINING ALCOHOL DO YOU HAVE ON A TYPICAL DAY WHEN YOU ARE DRINKING?: 1 OR 2

## 2025-03-02 SDOH — SOCIAL STABILITY: SOCIAL INSECURITY: DOES ANYONE TRY TO KEEP YOU FROM HAVING/CONTACTING OTHER FRIENDS OR DOING THINGS OUTSIDE YOUR HOME?: NO

## 2025-03-02 SDOH — SOCIAL STABILITY: SOCIAL INSECURITY: DO YOU FEEL ANYONE HAS EXPLOITED OR TAKEN ADVANTAGE OF YOU FINANCIALLY OR OF YOUR PERSONAL PROPERTY?: NO

## 2025-03-02 SDOH — SOCIAL STABILITY: SOCIAL INSECURITY
WITHIN THE LAST YEAR, HAVE YOU BEEN RAPED OR FORCED TO HAVE ANY KIND OF SEXUAL ACTIVITY BY YOUR PARTNER OR EX-PARTNER?: NO

## 2025-03-02 SDOH — HEALTH STABILITY: MENTAL HEALTH: HOW OFTEN DO YOU HAVE A DRINK CONTAINING ALCOHOL?: NEVER

## 2025-03-02 SDOH — HEALTH STABILITY: MENTAL HEALTH: HOW OFTEN DO YOU HAVE SIX OR MORE DRINKS ON ONE OCCASION?: NEVER

## 2025-03-02 SDOH — ECONOMIC STABILITY: INCOME INSECURITY
IN THE PAST 12 MONTHS HAS THE ELECTRIC, GAS, OIL, OR WATER COMPANY THREATENED TO SHUT OFF SERVICES IN YOUR HOME?: PATIENT DECLINED

## 2025-03-02 SDOH — SOCIAL STABILITY: SOCIAL INSECURITY: ABUSE: ADULT

## 2025-03-02 SDOH — ECONOMIC STABILITY: FOOD INSECURITY
WITHIN THE PAST 12 MONTHS, YOU WORRIED THAT YOUR FOOD WOULD RUN OUT BEFORE YOU GOT THE MONEY TO BUY MORE.: PATIENT DECLINED

## 2025-03-02 SDOH — SOCIAL STABILITY: SOCIAL INSECURITY: DO YOU FEEL UNSAFE GOING BACK TO THE PLACE WHERE YOU ARE LIVING?: NO

## 2025-03-02 SDOH — SOCIAL STABILITY: SOCIAL INSECURITY: HAVE YOU HAD ANY THOUGHTS OF HARMING ANYONE ELSE?: NO

## 2025-03-02 SDOH — SOCIAL STABILITY: SOCIAL INSECURITY: WITHIN THE LAST YEAR, HAVE YOU BEEN AFRAID OF YOUR PARTNER OR EX-PARTNER?: NO

## 2025-03-02 SDOH — SOCIAL STABILITY: SOCIAL INSECURITY: WITHIN THE LAST YEAR, HAVE YOU BEEN HUMILIATED OR EMOTIONALLY ABUSED IN OTHER WAYS BY YOUR PARTNER OR EX-PARTNER?: NO

## 2025-03-02 SDOH — SOCIAL STABILITY: SOCIAL INSECURITY: ARE THERE ANY APPARENT SIGNS OF INJURIES/BEHAVIORS THAT COULD BE RELATED TO ABUSE/NEGLECT?: NO

## 2025-03-02 SDOH — HEALTH STABILITY: MENTAL HEALTH: HOW OFTEN DO YOU HAVE A DRINK CONTAINING ALCOHOL?: 2-3 TIMES A WEEK

## 2025-03-02 SDOH — HEALTH STABILITY: MENTAL HEALTH: HOW MANY DRINKS CONTAINING ALCOHOL DO YOU HAVE ON A TYPICAL DAY WHEN YOU ARE DRINKING?: PATIENT DOES NOT DRINK

## 2025-03-02 SDOH — SOCIAL STABILITY: SOCIAL INSECURITY: ARE YOU OR HAVE YOU BEEN THREATENED OR ABUSED PHYSICALLY, EMOTIONALLY, OR SEXUALLY BY ANYONE?: NO

## 2025-03-02 SDOH — ECONOMIC STABILITY: FOOD INSECURITY: WITHIN THE PAST 12 MONTHS, THE FOOD YOU BOUGHT JUST DIDN'T LAST AND YOU DIDN'T HAVE MONEY TO GET MORE.: PATIENT DECLINED

## 2025-03-02 SDOH — SOCIAL STABILITY: SOCIAL INSECURITY: WERE YOU ABLE TO COMPLETE ALL THE BEHAVIORAL HEALTH SCREENINGS?: YES

## 2025-03-02 SDOH — SOCIAL STABILITY: SOCIAL INSECURITY: HAVE YOU HAD THOUGHTS OF HARMING ANYONE ELSE?: NO

## 2025-03-02 SDOH — SOCIAL STABILITY: SOCIAL INSECURITY: HAS ANYONE EVER THREATENED TO HURT YOUR FAMILY OR YOUR PETS?: NO

## 2025-03-02 ASSESSMENT — ACTIVITIES OF DAILY LIVING (ADL)
TOILETING: DEPENDENT
BATHING: DEPENDENT
JUDGMENT_ADEQUATE_SAFELY_COMPLETE_DAILY_ACTIVITIES: YES
LACK_OF_TRANSPORTATION: PATIENT DECLINED
HEARING - RIGHT EAR: FUNCTIONAL
ASSISTIVE_DEVICE: EYEGLASSES;WHEELCHAIR
PATIENT'S MEMORY ADEQUATE TO SAFELY COMPLETE DAILY ACTIVITIES?: YES
ADEQUATE_TO_COMPLETE_ADL: YES
WALKS IN HOME: DEPENDENT
GROOMING: DEPENDENT
FEEDING YOURSELF: INDEPENDENT
HEARING - LEFT EAR: FUNCTIONAL
DRESSING YOURSELF: DEPENDENT

## 2025-03-02 ASSESSMENT — COGNITIVE AND FUNCTIONAL STATUS - GENERAL
TURNING FROM BACK TO SIDE WHILE IN FLAT BAD: A LOT
MOVING FROM LYING ON BACK TO SITTING ON SIDE OF FLAT BED WITH BEDRAILS: A LOT
DRESSING REGULAR UPPER BODY CLOTHING: TOTAL
DRESSING REGULAR LOWER BODY CLOTHING: TOTAL
MOBILITY SCORE: 8
TOILETING: TOTAL
MOVING TO AND FROM BED TO CHAIR: TOTAL
WALKING IN HOSPITAL ROOM: TOTAL
MOBILITY SCORE: 8
DRESSING REGULAR UPPER BODY CLOTHING: TOTAL
STANDING UP FROM CHAIR USING ARMS: TOTAL
DRESSING REGULAR LOWER BODY CLOTHING: TOTAL
DAILY ACTIVITIY SCORE: 10
DAILY ACTIVITIY SCORE: 10
EATING MEALS: A LITTLE
TOILETING: TOTAL
MOVING FROM LYING ON BACK TO SITTING ON SIDE OF FLAT BED WITH BEDRAILS: A LOT
CLIMB 3 TO 5 STEPS WITH RAILING: TOTAL
HELP NEEDED FOR BATHING: TOTAL
TURNING FROM BACK TO SIDE WHILE IN FLAT BAD: A LOT
PERSONAL GROOMING: A LITTLE
PERSONAL GROOMING: A LITTLE
EATING MEALS: A LITTLE
HELP NEEDED FOR BATHING: TOTAL
STANDING UP FROM CHAIR USING ARMS: TOTAL
WALKING IN HOSPITAL ROOM: TOTAL
CLIMB 3 TO 5 STEPS WITH RAILING: TOTAL
MOVING TO AND FROM BED TO CHAIR: TOTAL
PATIENT BASELINE BEDBOUND: YES

## 2025-03-02 ASSESSMENT — PAIN SCALES - GENERAL
PAINLEVEL_OUTOF10: 5 - MODERATE PAIN
PAINLEVEL_OUTOF10: 0 - NO PAIN
PAINLEVEL_OUTOF10: 0 - NO PAIN

## 2025-03-02 ASSESSMENT — LIFESTYLE VARIABLES
AUDIT-C TOTAL SCORE: 3
SKIP TO QUESTIONS 9-10: 1
AUDIT-C TOTAL SCORE: 0
HOW OFTEN DO YOU HAVE A DRINK CONTAINING ALCOHOL: 2-3 TIMES A WEEK
AUDIT-C TOTAL SCORE: 3
HOW OFTEN DO YOU HAVE 6 OR MORE DRINKS ON ONE OCCASION: NEVER
SKIP TO QUESTIONS 9-10: 1
HOW MANY STANDARD DRINKS CONTAINING ALCOHOL DO YOU HAVE ON A TYPICAL DAY: 1 OR 2
SKIP TO QUESTIONS 9-10: 1
AUDIT-C TOTAL SCORE: 3

## 2025-03-02 ASSESSMENT — PAIN - FUNCTIONAL ASSESSMENT
PAIN_FUNCTIONAL_ASSESSMENT: 0-10

## 2025-03-02 ASSESSMENT — PAIN DESCRIPTION - PROGRESSION: CLINICAL_PROGRESSION: NOT CHANGED

## 2025-03-02 NOTE — PROGRESS NOTES
General Surgery    Patient with colonic ileus. Electrolytes look okay, and no obvious underlying infection. Small bowel looks relatively decompressed, so would be okay holding off on NGT. However, if patient were to vomit would need NGT placed. Recommend strict NPO. Full consult to follow, may require decompressive colonoscopy.    Aleksandra Cash MD  03/02/25  11:43 AM

## 2025-03-02 NOTE — ED PROVIDER NOTES
"HPI   Chief Complaint   Patient presents with    Abdominal Pain    Nausea     Pt sent from [facility] for abdominal pain, nausea and loose stools since yesterday. Reports patient's abdomen is \"rock hard\". Patient has chronic heaton, which is draining clear yellow urine. Urinalysis was negative for infection yesterday.        Patient presents to the emergency department by squad from his nursing home out of concern for abdominal pain.  Progressive over the past few days.  Had liquid diarrhea yesterday.  Prior history of bowel perforation.      History provided by:  Patient   used: No            Patient History   Past Medical History:   Diagnosis Date    Anemia     Bowel perforation (Multi)     Buttock wound, unspecified laterality, subsequent encounter     COPD (chronic obstructive pulmonary disease) (Multi)     Extended spectrum beta lactamase (ESBL) resistance     GERD (gastroesophageal reflux disease)     Hyperlipidemia     Hypertension     Hypo-osmolality and hyponatremia     Insomnia     Multiple sclerosis (Multi)     Neurogenic bladder     Neuromuscular dysfunction of bladder     Personal history of transient ischemic attack (TIA), and cerebral infarction without residual deficits 08/06/2020    History of embolic stroke    Pulmonary embolism     Sepsis with encephalopathy (Multi)     Septic shock (Multi)     Simple febrile convulsions (Multi)     Status post administration of tPA (rtPA) in a different facility within the last 24 hours prior to admission to current facility 08/06/2020    Tissue plasminogen activator (tPA) administered at other facility within 24 hours before current admission    UTI (urinary tract infection)      Past Surgical History:   Procedure Laterality Date    MR HEAD ANGIO WO IV CONTRAST  6/29/2020    MR HEAD ANGIO WO IV CONTRAST 6/29/2020 [facility] CLINICAL LEGACY    MR NECK ANGIO WO IV CONTRAST  6/29/2020    MR NECK ANGIO WO IV CONTRAST 6/29/2020 [facility] CLINICAL LEGACY     No " family history on file.  Social History     Tobacco Use    Smoking status: Never    Smokeless tobacco: Never   Substance Use Topics    Alcohol use: Not on file    Drug use: Never       Physical Exam   ED Triage Vitals [03/02/25 0901]   Temperature Heart Rate Respirations BP   36.2 °C (97.2 °F) 91 18 (!) 141/113      Pulse Ox Temp Source Heart Rate Source Patient Position   96 % Temporal Monitor Sitting      BP Location FiO2 (%)     Left arm --       Physical Exam  Vitals and nursing note reviewed.   Constitutional:       General: He is not in acute distress.     Appearance: Normal appearance. He is normal weight. He is not ill-appearing, toxic-appearing or diaphoretic.   HENT:      Head: Normocephalic and atraumatic.      Nose: Nose normal. No rhinorrhea.   Neck:      Comments: Trachea is midline  Cardiovascular:      Rate and Rhythm: Normal rate and regular rhythm.      Heart sounds: No murmur heard.  Pulmonary:      Effort: Pulmonary effort is normal.      Breath sounds: Normal breath sounds. No wheezing.   Abdominal:      General: Abdomen is flat. A surgical scar is present. Bowel sounds are normal. There is distension.      Palpations: Abdomen is soft.      Tenderness: There is generalized abdominal tenderness. There is no right CVA tenderness or left CVA tenderness.      Hernia: No hernia is present.      Comments: Abdomen is firm but not rigid.  Distended.   Musculoskeletal:         General: Normal range of motion.      Cervical back: Normal range of motion.   Skin:     General: Skin is warm and dry.      Findings: No rash.   Neurological:      General: No focal deficit present.      Mental Status: He is alert and oriented to person, place, and time. Mental status is at baseline.   Psychiatric:         Mood and Affect: Mood normal.         Behavior: Behavior normal.         Thought Content: Thought content normal.         Judgment: Judgment normal.           ED Course & MDM   Diagnoses as of 03/02/25 2074    Ileus (Multi)                 No data recorded     [place] Coma Scale Score: 15 (03/02/25 0906 : Cassi Trevino, REEMA)                           Medical Decision Making  Patient's CAT scan shows evidence of ileus.  I spoke to general surgery on-call who recommended strict n.p.o. status, pain control, and that they would see the patient in consultation as he may need a decompressive colonoscopy.  Dr. Sippy did not recommend an NG tube at this time.  Patient case was discussed with the hospitalist who will admit the patient to their service in stable condition.        Procedure  Procedures     Ilan Almanzar,   03/02/25 1155

## 2025-03-02 NOTE — H&P
History Of Present Illness  Raheem Hilton is a 64 y.o. male presenting with abdominal pain for the last 3 days.  He last had a bowel movement 3 days ago.  He is coming from a nursing home which he has been out for 12 years.  He has advanced multiple sclerosis.  He is bedbound.  Does have multiple abdominal surgeries including splenectomy and ostomy placement were with reversal of ostomy.  When I evaluated the patient, he had just recently had a significant bowel movement with significant gas passing.  He has had significant improvement of his abdominal pain.    Case was discussed in detail with general surgeon, Dr. Cash.  She recommends trialing the patient on neostigmine and then performing colonoscope decompression if he does not improve with this.    Workup in the emergency department showed CT abdomen pelvis showing dilatation of small and large bowel without definite findings of obstruction.  CBC showed white blood cell count of 9.1, hemoglobin of 14.1, platelet count of 311.  CMP was unremarkable.  Lactate was 1.0.  Lipase was 14.  Urinalysis showed 1+ nitrites and 500 leukocyte esterase with greater than 50 white blood cells but 1+ bacteria.  Patient denies any symptoms at this time and denies fever, chills, sweats, suprapubic pain.  Patient will be admitted to hospital for further management of ileus started on neostigmine with consultation to general surgery.     Past Medical History  He has a past medical history of Anemia, Bowel perforation (Multi), Buttock wound, unspecified laterality, subsequent encounter, COPD (chronic obstructive pulmonary disease) (Multi), Extended spectrum beta lactamase (ESBL) resistance, GERD (gastroesophageal reflux disease), Hyperlipidemia, Hypertension, Hypo-osmolality and hyponatremia, Insomnia, Multiple sclerosis (Multi), Neurogenic bladder, Neuromuscular dysfunction of bladder, Personal history of transient ischemic attack (TIA), and cerebral infarction without residual  deficits (08/06/2020), Pulmonary embolism, Sepsis with encephalopathy (Multi), Septic shock (Multi), Simple febrile convulsions (Multi), Status post administration of tPA (rtPA) in a different facility within the last 24 hours prior to admission to current facility (08/06/2020), and UTI (urinary tract infection).    Surgical History  He has a past surgical history that includes MR angio head wo IV contrast (6/29/2020) and MR angio neck wo IV contrast (6/29/2020).     Social History  He reports that he has never smoked. He has never used smokeless tobacco. He reports that he does not use drugs. No history on file for alcohol use.    Family History  No family history on file.     Allergies  Cefepime, Doxycycline, Heparin, and Sulfamethoxazole-trimethoprim            Physical Exam  General:     Well appearing  HENT:      Head: Normocephalic and atraumatic.      Mouth: Mucous membranes are moist.   Eyes:      Pupils are equal, round, and reactive to light.   Cardiovascular:      Normal rate and regular rhythm. Normal S1, S2.  No murmurs, clicks, gallops.   Pulmonary:      Clear to auscultation bilaterally.  No wheezing, rhonchi, or crackles heard.   Abdominal:      Decreased bowel sounds.  Suprapubic catheter in place.  Slight distention with tympany on percussion.  In nontender to palpation.  Skin:      No lesions seen  Musculoskeletal:         Extremities: No edema present. No deformities with no abnormal range of motion     Neck supple.   Neurological:      Mental Status: Patient is alert and oriented X3     CN II-XII intact.  No focal neurologic deficits appreciated.      Last Recorded Vitals  /88 (BP Location: Right arm, Patient Position: Lying)   Pulse 98   Temp 36.7 °C (98 °F) (Temporal)   Resp 18   Wt 78.3 kg (172 lb 9.9 oz)   SpO2 96%     Relevant Results           Assessment/Plan   Assessment & Plan  Ileus (Multi)  Consult general surgery.  There is concern that this is related to function from his  advanced multiple sclerosis.  We will give a dose of neostigmine at this time.  Monitor on telemetry.  Obtain KUB before this administration.  Patient will remain n.p.o. for now.    2.  Multiple sclerosis: Continue supportive care.  Patient is followed with neurology outpatient.    3.  Hypertension: Blood pressure stable at this time.  Continue metoprolol.    4.  Bedbound    5.  Hyperlipidemia: Continue statin    6.  History of ostomy    7.  History of splenectomy    8.  History of tobacco abuse: Patient quit smoking 12 years ago       DVT ppx: Fondaparinux (patient has reported heparin allergy)  Diet: N.p.o.    Ramón Christianson DO

## 2025-03-02 NOTE — ASSESSMENT & PLAN NOTE
Consult general surgery.  There is concern that this is related to function from his advanced multiple sclerosis.  We will give a dose of neostigmine at this time.  Monitor on telemetry.  Obtain KUB before this administration.  Patient will remain n.p.o. for now.

## 2025-03-02 NOTE — PROGRESS NOTES
General Surgery Consultation    Patient: Raheem Hilton  : 1960  MRN: [id number]  Date of Consultation: 25    Primary Care Provider: Luca Angeles MD  Referring Provider: Ramón Christianson DO    Chief Complaint: Abdominal distention    History of Present Illness: Raheem Hilton is a 64 y.o. old male seen at the request of Dr. Christianson for evaluation of abdominal distention.  He has MS and resides in a skilled nursing facility.  He is nonambulatory at baseline.  He typically has bowel movements once every 3 days.  He takes Colace and MiraLAX regularly.  He had not had a bowel movement in 3 days.  He had significant abdominal distention, which he reports is new for him.  He denies nausea or vomiting, although he has decreased appetite due to feeling full and bloated.  He had a little bit of abdominal discomfort, but no significant abdominal pain.  He had a CT scan performed in the emergency department that showed a very dilated colon.  He has a history of what looks like a sigmoid colectomy with diverting ileostomy, followed by ileostomy reversal.  Both anastomoses look patent.  He does not appear to have any distal obstruction on the scan.  He denies any similar abdominal distention in the past.  He denies any recent illness or infection, although he has had loose bowel movements for the past couple of weeks.    Medical History:  Multiple sclerosis  Neurogenic bladder, chronic suprapubic tube  History of UTIs  Hypertension  Hyperlipidemia  COPD  History of pulmonary embolism  TIA  GERD  Chronic sacral decubitus ulcer    Surgical History:  Splenectomy  Sigmoid colectomy with diverting ileostomy  Reversal of ileostomy    Home Medications:  Prior to Admission medications    Medication Sig Start Date End Date Taking? Authorizing Provider   acetaminophen (Tylenol) 325 mg tablet Take 2 tablets (650 mg) by mouth every 4 hours if needed for moderate pain (4 - 6). 10/2/23   Sarahi GUADARRAMA MD   albuterol 2.5  mg /3 mL (0.083 %) nebulizer solution Take 3 mL (2.5 mg) by nebulization every 6 hours if needed for wheezing. 10/2/23   Sarahi GUADARRAMA MD   atorvastatin (Lipitor) 80 mg tablet Take 1 tablet (80 mg) by mouth once daily at bedtime.    Historical Provider, MD   baclofen (Lioresal) 10 mg tablet Take 1 tablet (10 mg) by mouth 3 times a day. 10/2/23   Sarahi GUADARRAMA MD   carBAMazepine (TEGretol) 200 mg tablet Take 1 tablet (200 mg) by mouth 2 times a day. Morning and at bedtime 10/2/23   Sarahi GUADARRAMA MD   COVID-19 antigen test (COVID-19 AT-HOME TEST MISC) if needed.    Historical Provider, MD   docusate sodium (Colace) 100 mg capsule Take 1 capsule (100 mg) by mouth 2 times a day.    Historical Provider, MD   DULCOLAX, BISACODYL, RECT Insert 1 suppository into the rectum once daily as needed (constipation).    Historical Provider, MD   gabapentin (Neurontin) 600 mg tablet Take 1 tablet (600 mg) by mouth 3 times a day. 10/2/23   Sarahi GUADARRAMA MD   HYDROcodone-acetaminophen (Norco) 5-325 mg tablet Take 1 tablet by mouth 2 times a day.    Historical Provider, MD   lactobacillus acidophilus capsule Take 1 capsule by mouth 3 times a day. For gastrointestinal distress 10/2/23   Sarahi GUADARRAMA MD   lactose-reduced food (BOOST PLUS ORAL) Take by mouth 2 times a day.    Historical Provider, MD   loperamide (Imodium) 2 mg capsule Take 1 capsule (2 mg) by mouth if needed for diarrhea (FOR DIARRHEA AFTER EACH LOOSE STOOL). NOT TO EXCEED 16MG/DAY    Historical Provider, MD   methIMAzole (Tapazole) 5 mg tablet Take 1 tablet (5 mg) by mouth 3 times a day. 10/2/23   Sarahi GUADARRAMA MD   metoprolol succinate XL (Toprol-XL) 25 mg 24 hr tablet Take 1 tablet (25 mg) by mouth once daily. Do not crush or chew. 10/3/23   Sarahi GUADARRAMA MD   ondansetron (Zofran) 4 mg tablet Take 1 tablet (4 mg) by mouth every 6 hours if needed for nausea.    Historical Provider, MD   polyethylene glycol (Glycolax, Miralax)  "17 gram packet Take 17 g by mouth once daily.    Historical Provider, MD   sodium phosphate,mono-dibasic (FLEET ENEMA RECT) Insert 1 Application into the rectum 1 time if needed (constipation).    Historical Provider, MD   vitamin B complex/minerals (STRESS B PLUS ZINC ORAL) Take 1 tablet by mouth once daily. For wound healing    Historical Provider, MD     Allergies:  Cefepime, doxycycline, heparin, and sulfamethoxazole-trimethoprim    Family History:   No family history of colon or rectal cancer.    Social History:  Non-smoker.  No alcohol or drug use.  Resides at [facility].    ROS:  Constitutional:  no fever, sweats, and chills  Cardiovascular: No chest pain  Respiratory: No cough or shortness of breath  Gastrointestinal: + Abdominal distention, obstipation, decreased appetite.  No nausea or vomiting.  No blood in the stool.  + Leading up to the obstipation he had a couple of weeks of loose bowel movements.  Genitourinary: + Neurogenic bladder with suprapubic tube, history of UTIs  Musculoskeletal: + Lower extremity weakness, nonambulatory  Integumentary: + Chronic sacral decubitus wound, denies any significant drainage or pain at the site  Neurological: + Multiple sclerosis  Endocrine: no heat or cold intolerance  Heme/Lymph: no easy bruising or bleeding    Objective:  /88 (BP Location: Right arm, Patient Position: Lying)   Pulse 98   Temp 36.7 °C (98 °F) (Temporal)   Resp 18   Ht 1.854 m (6' 0.99\")   Wt 78.3 kg (172 lb 9.9 oz)   SpO2 96%   BMI 22.78 kg/m²     Physical Exam:  Constitutional: No acute distress, conversant, pleasant, sitting semiupright in bed  Neurologic: alert and oriented  Psych: appropriate affect  Ears, Nose, Mouth and Throat: mucus membranes moist  Pulmonary: No labored breathing, on room air  Cardiovascular: Regular rate and rhythm  Abdomen: soft, moderately distended, nontender, midline laparotomy scar, transverse scar in right mid abdomen consistent " with previous stoma site  Musculoskeletal: Moves all extremities, no edema  Skin: no jaundice    Labs:  WBC 9.1, Hgb 14.1, sodium 131 but electrolytes otherwise within normal limits, LA 1.0    Imaging:  CT abdomen and pelvis reviewed: Distal colonic anastomosis.  Stool distends the rectum up to 9 cm.  Upstream gaseous distention of the colon similar to prior exam.  Transverse colon measuring nearly 9 cm.  Distal loops of small bowel adjacent to anastomosis are dilated up to 3.5 cm.  This is similar to a prior exam.  Proximal small bowel is not dilated.    Assessment and Plan: Raheem Hilton is a 64 y.o. old male with colonic ileus.  Since time of CT scan and being admitted, he has since passed what the nurses report is a large amount of flatus.  He also had a moderate-sized soft brown bowel movement.  He feels that his abdomen is not back to its normal size, but is less distended than it was in the emergency department at time of CT scan.  I therefore recommend obtaining a repeat KUB.  If he has significant improvement, we could take an observational approach by just keeping him NPO.  If he is still significantly distended, I would recommend a dose of neostigmine.  If he fails to improve, we could consider a decompressive colonoscopy.    Aleksandra Cash MD  3/2/2025

## 2025-03-02 NOTE — CARE PLAN
Problem: Pain - Adult  Goal: Verbalizes/displays adequate comfort level or baseline comfort level  Outcome: Progressing     Problem: Safety - Adult  Goal: Free from fall injury  Outcome: Progressing     Problem: Discharge Planning  Goal: Discharge to home or other facility with appropriate resources  Outcome: Progressing     Problem: Chronic Conditions and Co-morbidities  Goal: Patient's chronic conditions and co-morbidity symptoms are monitored and maintained or improved  Outcome: Progressing     Problem: Nutrition  Goal: Nutrient intake appropriate for maintaining nutritional needs  Outcome: Progressing     Problem: Skin  Goal: Decreased wound size/increased tissue granulation at next dressing change  Outcome: Progressing  Flowsheets (Taken 3/2/2025 1411)  Decreased wound size/increased tissue granulation at next dressing change: Promote sleep for wound healing  Goal: Participates in plan/prevention/treatment measures  Outcome: Progressing  Flowsheets (Taken 3/2/2025 1411)  Participates in plan/prevention/treatment measures: Increase activity/out of bed for meals  Goal: Prevent/manage excess moisture  Outcome: Progressing  Flowsheets (Taken 3/2/2025 1411)  Prevent/manage excess moisture: Moisturize dry skin  Goal: Prevent/minimize sheer/friction injuries  Outcome: Progressing  Flowsheets (Taken 3/2/2025 1411)  Prevent/minimize sheer/friction injuries: Use pull sheet  Goal: Promote/optimize nutrition  Outcome: Progressing  Flowsheets (Taken 3/2/2025 1411)  Promote/optimize nutrition: Consume > 50% meals/supplements  Goal: Promote skin healing  Outcome: Progressing  Flowsheets (Taken 3/2/2025 1411)  Promote skin healing: Turn/reposition every 2 hours/use positioning/transfer devices     Problem: Pain  Goal: Takes deep breaths with improved pain control throughout the shift  Outcome: Progressing  Goal: Turns in bed with improved pain control throughout the shift  Outcome: Progressing  Goal: Walks with improved pain  control throughout the shift  Outcome: Progressing  Goal: Performs ADL's with improved pain control throughout shift  Outcome: Progressing  Goal: Participates in PT with improved pain control throughout the shift  Outcome: Progressing  Goal: Free from opioid side effects throughout the shift  Outcome: Progressing  Goal: Free from acute confusion related to pain meds throughout the shift  Outcome: Progressing   The patient's goals for the shift include feel better    The clinical goals for the shift include pain control    Over the shift, the patient did not make progress toward the following goals. Barriers to progression include . Recommendations to address these barriers include .

## 2025-03-03 ENCOUNTER — APPOINTMENT (OUTPATIENT)
Dept: RADIOLOGY | Facility: HOSPITAL | Age: 65
DRG: 390 | End: 2025-03-03
Payer: MEDICARE

## 2025-03-03 LAB
ANION GAP SERPL CALC-SCNC: 10 MMOL/L (ref 10–20)
BUN SERPL-MCNC: 9 MG/DL (ref 6–23)
CALCIUM SERPL-MCNC: 8.1 MG/DL (ref 8.6–10.3)
CHLORIDE SERPL-SCNC: 103 MMOL/L (ref 98–107)
CO2 SERPL-SCNC: 24 MMOL/L (ref 21–32)
CREAT SERPL-MCNC: 0.62 MG/DL (ref 0.5–1.3)
EGFRCR SERPLBLD CKD-EPI 2021: >90 ML/MIN/1.73M*2
ERYTHROCYTE [DISTWIDTH] IN BLOOD BY AUTOMATED COUNT: 13.7 % (ref 11.5–14.5)
GLUCOSE SERPL-MCNC: 125 MG/DL (ref 74–99)
HCT VFR BLD AUTO: 39.9 % (ref 41–52)
HGB BLD-MCNC: 13.2 G/DL (ref 13.5–17.5)
MAGNESIUM SERPL-MCNC: 1.83 MG/DL (ref 1.6–2.4)
MCH RBC QN AUTO: 31.2 PG (ref 26–34)
MCHC RBC AUTO-ENTMCNC: 33.1 G/DL (ref 32–36)
MCV RBC AUTO: 94 FL (ref 80–100)
NRBC BLD-RTO: 0 /100 WBCS (ref 0–0)
PLATELET # BLD AUTO: 303 X10*3/UL (ref 150–450)
POTASSIUM SERPL-SCNC: 3.5 MMOL/L (ref 3.5–5.3)
RBC # BLD AUTO: 4.23 X10*6/UL (ref 4.5–5.9)
SODIUM SERPL-SCNC: 133 MMOL/L (ref 136–145)
T4 FREE SERPL-MCNC: 0.75 NG/DL (ref 0.61–1.12)
TSH SERPL-ACNC: 4.44 MIU/L (ref 0.44–3.98)
WBC # BLD AUTO: 13.2 X10*3/UL (ref 4.4–11.3)

## 2025-03-03 PROCEDURE — 2500000004 HC RX 250 GENERAL PHARMACY W/ HCPCS (ALT 636 FOR OP/ED): Performed by: NURSE PRACTITIONER

## 2025-03-03 PROCEDURE — 2500000002 HC RX 250 W HCPCS SELF ADMINISTERED DRUGS (ALT 637 FOR MEDICARE OP, ALT 636 FOR OP/ED): Performed by: STUDENT IN AN ORGANIZED HEALTH CARE EDUCATION/TRAINING PROGRAM

## 2025-03-03 PROCEDURE — 80048 BASIC METABOLIC PNL TOTAL CA: CPT | Performed by: STUDENT IN AN ORGANIZED HEALTH CARE EDUCATION/TRAINING PROGRAM

## 2025-03-03 PROCEDURE — 2500000001 HC RX 250 WO HCPCS SELF ADMINISTERED DRUGS (ALT 637 FOR MEDICARE OP): Performed by: NURSE PRACTITIONER

## 2025-03-03 PROCEDURE — 1200000002 HC GENERAL ROOM WITH TELEMETRY DAILY

## 2025-03-03 PROCEDURE — 99232 SBSQ HOSP IP/OBS MODERATE 35: CPT | Performed by: NURSE PRACTITIONER

## 2025-03-03 PROCEDURE — 99232 SBSQ HOSP IP/OBS MODERATE 35: CPT | Performed by: SURGERY

## 2025-03-03 PROCEDURE — 2500000001 HC RX 250 WO HCPCS SELF ADMINISTERED DRUGS (ALT 637 FOR MEDICARE OP): Performed by: STUDENT IN AN ORGANIZED HEALTH CARE EDUCATION/TRAINING PROGRAM

## 2025-03-03 PROCEDURE — 2500000005 HC RX 250 GENERAL PHARMACY W/O HCPCS: Performed by: STUDENT IN AN ORGANIZED HEALTH CARE EDUCATION/TRAINING PROGRAM

## 2025-03-03 PROCEDURE — 94640 AIRWAY INHALATION TREATMENT: CPT

## 2025-03-03 PROCEDURE — 2500000004 HC RX 250 GENERAL PHARMACY W/ HCPCS (ALT 636 FOR OP/ED): Performed by: STUDENT IN AN ORGANIZED HEALTH CARE EDUCATION/TRAINING PROGRAM

## 2025-03-03 PROCEDURE — 74018 RADEX ABDOMEN 1 VIEW: CPT

## 2025-03-03 PROCEDURE — 84443 ASSAY THYROID STIM HORMONE: CPT | Performed by: NURSE PRACTITIONER

## 2025-03-03 PROCEDURE — 83735 ASSAY OF MAGNESIUM: CPT | Performed by: STUDENT IN AN ORGANIZED HEALTH CARE EDUCATION/TRAINING PROGRAM

## 2025-03-03 PROCEDURE — 85027 COMPLETE CBC AUTOMATED: CPT | Performed by: STUDENT IN AN ORGANIZED HEALTH CARE EDUCATION/TRAINING PROGRAM

## 2025-03-03 PROCEDURE — 84439 ASSAY OF FREE THYROXINE: CPT | Performed by: NURSE PRACTITIONER

## 2025-03-03 PROCEDURE — 94761 N-INVAS EAR/PLS OXIMETRY MLT: CPT

## 2025-03-03 PROCEDURE — 36415 COLL VENOUS BLD VENIPUNCTURE: CPT | Performed by: STUDENT IN AN ORGANIZED HEALTH CARE EDUCATION/TRAINING PROGRAM

## 2025-03-03 RX ORDER — DEXTROSE MONOHYDRATE AND SODIUM CHLORIDE 5; .45 G/100ML; G/100ML
100 INJECTION, SOLUTION INTRAVENOUS CONTINUOUS
Status: ACTIVE | OUTPATIENT
Start: 2025-03-03 | End: 2025-03-04

## 2025-03-03 RX ORDER — HYDROCODONE BITARTRATE AND ACETAMINOPHEN 5; 325 MG/1; MG/1
1 TABLET ORAL EVERY 6 HOURS PRN
Status: DISCONTINUED | OUTPATIENT
Start: 2025-03-03 | End: 2025-03-04 | Stop reason: HOSPADM

## 2025-03-03 RX ADMIN — METHIMAZOLE 5 MG: 5 TABLET ORAL at 20:09

## 2025-03-03 RX ADMIN — GABAPENTIN 600 MG: 300 CAPSULE ORAL at 15:21

## 2025-03-03 RX ADMIN — Medication 2 L/MIN: at 11:30

## 2025-03-03 RX ADMIN — CARBAMAZEPINE 200 MG: 200 TABLET ORAL at 09:25

## 2025-03-03 RX ADMIN — Medication 2 L/MIN: at 18:58

## 2025-03-03 RX ADMIN — METHIMAZOLE 5 MG: 5 TABLET ORAL at 15:21

## 2025-03-03 RX ADMIN — FONDAPARINUX SODIUM 2.5 MG: 2.5 INJECTION SUBCUTANEOUS at 14:09

## 2025-03-03 RX ADMIN — DEXTROSE AND SODIUM CHLORIDE 100 ML/HR: 5; 450 INJECTION, SOLUTION INTRAVENOUS at 11:56

## 2025-03-03 RX ADMIN — DEXTROSE AND SODIUM CHLORIDE 100 ML/HR: 5; .45 INJECTION, SOLUTION INTRAVENOUS at 01:39

## 2025-03-03 RX ADMIN — BACLOFEN 10 MG: 10 TABLET ORAL at 09:24

## 2025-03-03 RX ADMIN — GABAPENTIN 600 MG: 300 CAPSULE ORAL at 20:09

## 2025-03-03 RX ADMIN — BACLOFEN 10 MG: 10 TABLET ORAL at 20:09

## 2025-03-03 RX ADMIN — CARBAMAZEPINE 200 MG: 200 TABLET ORAL at 20:09

## 2025-03-03 RX ADMIN — IPRATROPIUM BROMIDE AND ALBUTEROL SULFATE 3 ML: 2.5; .5 SOLUTION RESPIRATORY (INHALATION) at 18:57

## 2025-03-03 RX ADMIN — METOPROLOL SUCCINATE 25 MG: 25 TABLET, EXTENDED RELEASE ORAL at 09:24

## 2025-03-03 RX ADMIN — METHIMAZOLE 5 MG: 5 TABLET ORAL at 09:25

## 2025-03-03 RX ADMIN — ATORVASTATIN CALCIUM 80 MG: 80 TABLET, FILM COATED ORAL at 20:09

## 2025-03-03 RX ADMIN — HYDROCODONE BITARTRATE AND ACETAMINOPHEN 1 TABLET: 5; 325 TABLET ORAL at 20:11

## 2025-03-03 RX ADMIN — IPRATROPIUM BROMIDE AND ALBUTEROL SULFATE 3 ML: 2.5; .5 SOLUTION RESPIRATORY (INHALATION) at 05:56

## 2025-03-03 RX ADMIN — GABAPENTIN 600 MG: 300 CAPSULE ORAL at 09:24

## 2025-03-03 RX ADMIN — BACLOFEN 10 MG: 10 TABLET ORAL at 15:21

## 2025-03-03 RX ADMIN — DEXTROSE AND SODIUM CHLORIDE 100 ML/HR: 5; 450 INJECTION, SOLUTION INTRAVENOUS at 07:49

## 2025-03-03 RX ADMIN — DEXTROSE AND SODIUM CHLORIDE 100 ML/HR: 5; 450 INJECTION, SOLUTION INTRAVENOUS at 22:32

## 2025-03-03 ASSESSMENT — PAIN - FUNCTIONAL ASSESSMENT: PAIN_FUNCTIONAL_ASSESSMENT: 0-10

## 2025-03-03 ASSESSMENT — COGNITIVE AND FUNCTIONAL STATUS - GENERAL
WALKING IN HOSPITAL ROOM: TOTAL
CLIMB 3 TO 5 STEPS WITH RAILING: TOTAL
DAILY ACTIVITIY SCORE: 10
EATING MEALS: A LITTLE
DRESSING REGULAR LOWER BODY CLOTHING: TOTAL
TOILETING: TOTAL
DRESSING REGULAR UPPER BODY CLOTHING: TOTAL
MOBILITY SCORE: 8
HELP NEEDED FOR BATHING: TOTAL
MOVING FROM LYING ON BACK TO SITTING ON SIDE OF FLAT BED WITH BEDRAILS: A LOT
MOVING TO AND FROM BED TO CHAIR: TOTAL
STANDING UP FROM CHAIR USING ARMS: TOTAL
PERSONAL GROOMING: A LITTLE
TURNING FROM BACK TO SIDE WHILE IN FLAT BAD: A LOT

## 2025-03-03 ASSESSMENT — ACTIVITIES OF DAILY LIVING (ADL): LACK_OF_TRANSPORTATION: PATIENT UNABLE TO ANSWER

## 2025-03-03 ASSESSMENT — PAIN SCALES - GENERAL
PAINLEVEL_OUTOF10: 7
PAINLEVEL_OUTOF10: 0 - NO PAIN

## 2025-03-03 ASSESSMENT — PAIN DESCRIPTION - LOCATION: LOCATION: LEG

## 2025-03-03 ASSESSMENT — PAIN DESCRIPTION - ORIENTATION: ORIENTATION: RIGHT;LEFT

## 2025-03-03 NOTE — PROGRESS NOTES
Raheem Hilton is a 64 y.o. male on day 1 of admission presenting with Ileus (Multi).      Subjective   Patient laying in bed.  Reports he is not passing gas or has had no further bowel movements.  He reports he can normally tell when he has a UTI because his hands go numb.  He is not experiencing that now.  Does not have fever or chills.       Objective     Last Recorded Vitals  BP 95/56   Pulse 77   Temp 36.7 °C (98 °F)   Resp 18   Wt 78.3 kg (172 lb 9.9 oz)   SpO2 100%   Intake/Output last 3 Shifts:    Intake/Output Summary (Last 24 hours) at 3/3/2025 1428  Last data filed at 3/3/2025 1147  Gross per 24 hour   Intake 2471.67 ml   Output 650 ml   Net 1821.67 ml       Admission Weight  Weight: 78.9 kg (174 lb) (03/02/25 0901)    Daily Weight  03/02/25 : 78.3 kg (172 lb 9.9 oz)    Image Results  XR chest 1 view  Narrative: Interpreted By:  Zeus Johnson,   STUDY:  XR CHEST 1 VIEW; 3/2/2025 6:00 pm      INDICATION:  Signs/Symptoms:Hypoxia      COMPARISON:  Radiographs 04/19/2024      ACCESSION NUMBER(S):  [id number]      ORDERING CLINICIAN:  LILIBETH ARREOLA      TECHNIQUE:  Single frontal view of the chest performed.      FINDINGS:  LINES AND DEVICES:  None.      LUNGS:  No focal consolidation, pulmonary edema, pleural effusion or  pneumothorax. Stable mild elevation of right hemidiaphragm.      CARDIOMEDIASTINAL SILHOUETTE:  The cardiomediastinal silhouette is within normal limits.      Impression: No acute pulmonary process.      MACRO  None      Signed by: Zeus Johnson 3/2/2025 9:05 PM  Dictation workstation:   [id number]  XR abdomen 1 view  Narrative: Interpreted By:  Teddy Pack,   STUDY:  XR ABDOMEN 1 VIEW;  3/2/2025 3:03 pm      INDICATION:  Signs/Symptoms:Ileus.      COMPARISON:  CT scan from 03/02/2025 at 9:55 a.m.. Plain films from 04/10/2023.      ACCESSION NUMBER(S):  [id number]      ORDERING CLINICIAN:  LILIBETH ARREOLA      TECHNIQUE:  Single supine view of the abdomen and pelvis was  obtained.      Supine views of the abdomen and pelvis were obtained.      FINDINGS:  There is mild-to-moderate scattered colonic stool and gas throughout  the colon and rectum. The appearance is similar to the CT scan but  with perhaps less stool in the rectum. There was no gross  organomegaly. There were no abnormal calcifications.  No destructive bone lesion.   Mild multilevel lumbar spine endplate  osteophytosis. Sclerotic arthritic changes in both SI joints. Mild  bilateral hip joint space narrowing with spur formation. Previous  bilateral vasectomy The extreme lung bases were clear.      Impression: Mild-to-moderate scattered colonic stool and gas, with perhaps  decrease in the amount of stool in the rectum since the earlier CT  scan.      Arthritic changes in the spine and pelvis as described.      MACRO:  None      Signed by: Teddy Pack 3/2/2025 3:10 PM  Dictation workstation:   [id number]  CT abdomen pelvis w IV contrast  Narrative: Interpreted By:  Apolinar Vaz,   STUDY:  CT ABDOMEN PELVIS W IV CONTRAST;  3/2/2025 10:00 am      INDICATION:  Signs/Symptoms:abd pain.      COMPARISON:  10/27/2024      ACCESSION NUMBER(S):  [id number]      ORDERING CLINICIAN:  OLIMPIA MONTEZ      TECHNIQUE:  CT of the abdomen and pelvis was performed.  Contiguous axial images  were obtained at 3 mm slice thickness through the abdomen and pelvis.  Coronal and sagittal reconstructions at 3 mm slice thickness were  performed. 75 mL Omnipaque 350 administered intravenously without  immediate complication.      FINDINGS:  LOWER CHEST:  Mild bibasilar atelectasis. Gynecomastia.      ABDOMEN:  Exam limited due to motion artifact.      LIVER:  Unremarkable      BILE DUCTS:  Not dilated.      GALLBLADDER:  No calcified stone or definite inflammation.      PANCREAS:  Unremarkable      SPLEEN:  Absent      ADRENAL GLANDS:  Unremarkable      KIDNEYS AND URETERS:  22 mm nonobstructing right renal pelvis calculus unchanged.  Additional  smaller right intrarenal calculi are also unchanged. A few  tiny cortical cysts bilaterally. Otherwise unremarkable kidneys  without hydronephrosis.      PELVIS:      BLADDER:  Decompressed by suprapubic catheter.      REPRODUCTIVE ORGANS:  Enlarged heterogeneous prostate.      BOWEL:  Distal colonic anastomosis. Stool distends the rectum up to 9 cm  diameter. Upstream gaseous distention of the colon is similar to  prior exam is similar to prior exam, transverse colon measuring  nearly 9 cm. Distal loops of small bowel adjacent to anastomosis are  dilated up to about 3.5 cm, similar to prior exam. Proximal small  bowel is not dilated. Unremarkable appendix.    Unremarkable  mesentery.      VESSELS:  Aortoiliac system is patent without aneurysm.  Major visceral  branches are patent.Severe atherosclerosis. IVC and major branches  are grossly patent. Major portal venous branches are patent.      PERITONEUM AND RETROPERITONEUM:  No free fluid or free air.   10 mm left inguinal node not  significantly changed probably reactive.      BONE AND ABDOMINAL WALL:  Sacral decubitus ulcer appears similar to prior exam. Severe anterior  wedging at L3 and mild anterior wedging at T12 unchanged. Lumbar and  hip degenerative changes.          Impression: 1.  Dilatation of small and large bowel without definite findings of  obstruction appears similar to prior exam. Ileus is possible.  2. Unchanged 2 cm nonobstructing right ureteropelvic junction  calculus.  3. Unchanged appearance of sacral decubitus ulcer.      MACRO:  None.      Signed by: Apolinar Vaz 3/2/2025 10:57 AM  Dictation workstation:   [id number]      Physical Exam  General:     Well appearing  HENT:      Head: Normocephalic and atraumatic.      Mouth: Mucous membranes are moist.   Eyes:      Pupils are equal, round, and reactive to light.   Cardiovascular:      Normal rate and regular rhythm. Normal S1, S2.  No murmurs, clicks, gallops.   Pulmonary:      Clear to  auscultation bilaterally.  No wheezing, rhonchi, or crackles heard.   Abdominal:      Decreased bowel sounds.  Suprapubic catheter in place.  Slight distention with tympany on percussion.  In nontender to palpation.  Skin:      No lesions seen  Musculoskeletal:         Extremities: No edema present. No deformities with no abnormal range of motion     Neck supple.   Neurological:      Mental Status: Patient is alert and oriented X3     CN II-XII intact.  No focal neurologic deficits appreciated.   Relevant Results     Results for orders placed or performed during the hospital encounter of 03/02/25 (from the past 24 hours)   CBC   Result Value Ref Range    WBC 13.2 (H) 4.4 - 11.3 x10*3/uL    nRBC 0.0 0.0 - 0.0 /100 WBCs    RBC 4.23 (L) 4.50 - 5.90 x10*6/uL    Hemoglobin 13.2 (L) 13.5 - 17.5 g/dL    Hematocrit 39.9 (L) 41.0 - 52.0 %    MCV 94 80 - 100 fL    MCH 31.2 26.0 - 34.0 pg    MCHC 33.1 32.0 - 36.0 g/dL    RDW 13.7 11.5 - 14.5 %    Platelets 303 150 - 450 x10*3/uL   Basic metabolic panel   Result Value Ref Range    Glucose 125 (H) 74 - 99 mg/dL    Sodium 133 (L) 136 - 145 mmol/L    Potassium 3.5 3.5 - 5.3 mmol/L    Chloride 103 98 - 107 mmol/L    Bicarbonate 24 21 - 32 mmol/L    Anion Gap 10 10 - 20 mmol/L    Urea Nitrogen 9 6 - 23 mg/dL    Creatinine 0.62 0.50 - 1.30 mg/dL    eGFR >90 >60 mL/min/1.73m*2    Calcium 8.1 (L) 8.6 - 10.3 mg/dL   Magnesium   Result Value Ref Range    Magnesium 1.83 1.60 - 2.40 mg/dL         Assessment/Plan    Raheem Hilton is a 64 y.o. male presenting with abdominal pain for the last 3 days.  He last had a bowel movement 3 days ago.  He is coming from a nursing home which he has been out for 12 years.  He has advanced multiple sclerosis.  He is bedbound.   Case was discussed in detail with general surgeon, Dr. Cash.  She recommends trialing the patient on neostigmine and then performing colonoscope decompression if he does not improve with this.     Workup in the emergency  department showed CT abdomen pelvis showing dilatation of small and large bowel without definite findings of obstruction.  CBC showed white blood cell count of 9.1, hemoglobin of 14.1, platelet count of 311.  CMP was unremarkable.  Lactate was 1.0.  Lipase was 14.  Urinalysis showed 1+ nitrites and 500 leukocyte esterase with greater than 50 white blood cells but 1+ bacteria.  Patient denies any symptoms at this time and denies fever, chills, sweats, suprapubic pain.  Patient will be admitted to hospital for further management of ileus started on neostigmine with consultation to general surgery.              Assessment & Plan  Ileus (Multi)  -Consult general surgery.    -Patient normally has a bowel movement every 3 days.  Uses Colace MiraLAX regularly   -Neostigmine given yesterday.  He did have a large bowel movement at that time.  No BM or passing gas since then.   -Awaiting repeat KUB from this morning.  If improvement in KUB plan to start oral intake.  -CBC, BMP magnesium in the morning         2.  Multiple sclerosis: Continue supportive care.  Patient is followed with neurology outpatient.     3.  Hypertension: Blood pressure stable at this time.  Continue metoprolol.     4.  Bedbound     5.  Hyperlipidemia: Continue statin     6.  History of ostomy     7.  History of splenectomy     8.  History of tobacco abuse: Patient quit smoking 12 years ago  9.  Hypothyroidism  -Check TSH reflex T4     DVT ppx: Fondaparinux (patient has reported heparin allergy)  Diet: N.p.o.       Disposition will return to nursing home when medically stable.  Paula Diaz, APRN-CNP

## 2025-03-03 NOTE — PROGRESS NOTES
"General Surgery Progress Note    Patient received neostigmine yesterday evening.  He had a very large bowel movement and a lot of flatus following this.  He reports \"I lost 7 pounds of shit\".  However, after this large bowel movement with the neostigmine he did not pass any additional flatus or bowel movement later in the evening/overnight.  He denies any nausea or abdominal pain this morning.    /65 (BP Location: Right arm, Patient Position: Lying)   Pulse 78   Temp 35.5 °C (95.9 °F) (Temporal)   Resp 18   Ht 1.854 m (6' 0.99\")   Wt 78.3 kg (172 lb 9.9 oz)   SpO2 96%   BMI 22.78 kg/m²   NAD, laying supine in bed   No labored breathing, on supplemental oxygen via nasal cannula  NSR  Abdomen soft, nondistended although he is quite tympanic in the epigastric region, nontender    Intake/Output Summary (Last 24 hours) at 3/3/2025 0655  Last data filed at 3/3/2025 0500  Gross per 24 hour   Intake 333.33 ml   Output 650 ml   Net -316.67 ml     WBC 13.2 (9.1 yesterday)  Cr 0.62, K 3.5, Mag 1.83    Patient is a 64 y.o. male with colonic ileus.  He had good effect with neostigmine yesterday.  However, he denies passing any additional flatus or bowel movement overnight aside from immediately following the neostigmine.  He is tympanic on exam overlying the transverse colon, which was his most dilated part of colon on imaging yesterday.  I will therefore reevaluate the degree of colonic distention with a KUB this morning.  If improvement on KUB, will plan to start oral intake.    Aleksandra Cash MD  03/03/25  6:58 AM          "

## 2025-03-03 NOTE — ASSESSMENT & PLAN NOTE
-Consult general surgery.    -Patient normally has a bowel movement every 3 days.  Uses Colace MiraLAX regularly   -Neostigmine given yesterday.  He did have a large bowel movement at that time.  No BM or passing gas since then.   -Awaiting repeat KUB from this morning.  If improvement in KUB plan to start oral intake.  -CBC, BMP magnesium in the morning

## 2025-03-03 NOTE — CARE PLAN
Problem: Pain - Adult  Goal: Verbalizes/displays adequate comfort level or baseline comfort level  Outcome: Progressing     Problem: Safety - Adult  Goal: Free from fall injury  Outcome: Progressing     Problem: Discharge Planning  Goal: Discharge to home or other facility with appropriate resources  Outcome: Progressing     Problem: Chronic Conditions and Co-morbidities  Goal: Patient's chronic conditions and co-morbidity symptoms are monitored and maintained or improved  Outcome: Progressing     Problem: Nutrition  Goal: Nutrient intake appropriate for maintaining nutritional needs  Outcome: Progressing     Problem: Skin  Goal: Decreased wound size/increased tissue granulation at next dressing change  Outcome: Progressing  Flowsheets (Taken 3/3/2025 1147)  Decreased wound size/increased tissue granulation at next dressing change: Promote sleep for wound healing  Goal: Participates in plan/prevention/treatment measures  Outcome: Progressing  Flowsheets (Taken 3/3/2025 1147)  Participates in plan/prevention/treatment measures: Increase activity/out of bed for meals  Goal: Prevent/manage excess moisture  Outcome: Progressing  Flowsheets (Taken 3/3/2025 1147)  Prevent/manage excess moisture: Moisturize dry skin  Goal: Prevent/minimize sheer/friction injuries  Outcome: Progressing  Flowsheets (Taken 3/3/2025 1147)  Prevent/minimize sheer/friction injuries: Use pull sheet  Goal: Promote/optimize nutrition  Outcome: Progressing  Flowsheets (Taken 3/3/2025 1147)  Promote/optimize nutrition: Consume > 50% meals/supplements  Goal: Promote skin healing  Outcome: Progressing  Flowsheets (Taken 3/3/2025 1147)  Promote skin healing: Turn/reposition every 2 hours/use positioning/transfer devices     Problem: Pain  Goal: Takes deep breaths with improved pain control throughout the shift  Outcome: Progressing  Goal: Turns in bed with improved pain control throughout the shift  Outcome: Progressing  Goal: Walks with improved pain  control throughout the shift  Outcome: Progressing  Goal: Performs ADL's with improved pain control throughout shift  Outcome: Progressing  Goal: Participates in PT with improved pain control throughout the shift  Outcome: Progressing  Goal: Free from opioid side effects throughout the shift  Outcome: Progressing  Goal: Free from acute confusion related to pain meds throughout the shift  Outcome: Progressing   The patient's goals for the shift include feel better    The clinical goals for the shift include pain control    Over the shift, the patient did not make progress toward the following goals. Barriers to progression include . Recommendations to address these barriers include .

## 2025-03-03 NOTE — PROGRESS NOTES
03/03/25 1545   Discharge Planning   Living Arrangements Alone   Support Systems Family members   Assistance Needed Complete care   Type of Residence Skilled nursing facility   Do you have animals or pets at home? No   Who is requesting discharge planning? Provider   Home or Post Acute Services Post acute facilities (Rehab/SNF/etc)   Type of Post Acute Facility Services Long term care   Expected Discharge Disposition Inter   Does the patient need discharge transport arranged? Yes   RoundTrip coordination needed? Yes   Has discharge transport been arranged? No   Financial Resource Strain   How hard is it for you to pay for the very basics like food, housing, medical care, and heating? Pt Unable   Housing Stability   In the last 12 months, was there a time when you were not able to pay the mortgage or rent on time? Pt Unable   In the past 12 months, how many times have you moved where you were living? 0   At any time in the past 12 months, were you homeless or living in a shelter (including now)? Pt Unable   Transportation Needs   In the past 12 months, has lack of transportation kept you from medical appointments or from getting medications? Pt Unable   In the past 12 months, has lack of transportation kept you from meetings, work, or from getting things needed for daily living? Pt Unable   Patient Choice   Patient / Family choosing to utilize agency / facility established prior to hospitalization Yes   Stroke Family Assessment   Stroke Family Assessment Needed No   Intensity of Service   Intensity of Service 0-30 min     Care Transitions: Patient reviewed in care round meeting this AM. ADOD 24-48 hours. Attempted to meet with patient at bedside, however he is currently receiving personal care per bedside nursing staff. Patient is from [facility] as long term care and is well known to the care team from previous admissions. He requires assistance with all care. Will send a return [facility] to [facility]. Care team to follow Carlotta Zamora RN/TCC

## 2025-03-03 NOTE — CONSULTS
"Nutrition Initial Assessment:   Nutrition Assessment    Reason for Assessment: Admission nursing screening    Patient is a pleasant 64 y.o. male presenting with Ileus. PMH includes MS, HTN, and GERD.    As this patient has multiple wounds with impaired healing, he would greatly benefit from Ensure Plus (vanilla per pt preference) BID with meals       Nutrition History:  Energy Intake: Poor < 50 % (NPO)  Food and Nutrient History: Pt reports intakes and appetite prior to admission were good and that he is ready to eat again. Denies nausea, vomiting, or abdominal pain. States that he was told that he will be able to eat again today. When asked about his PIs, pt reports that he has had them for months (buttocks PI was noted by a previous RD at an admission 10 months ago).  Food Allergy:  (NKFA)     Anthropometrics:  Height: 185.4 cm (6' 0.99\")   Weight: 78.3 kg (172 lb 9.9 oz)   BMI (Calculated): 22.78  IBW/kg (Dietitian Calculated): 83.6 kg    Weight History:   Daily Weight  03/02/25 : 78.3 kg (172 lb 9.9 oz)  01/19/25 : 80.3 kg (177 lb)  10/27/24 : 79.4 kg (175 lb)  10/22/24 : 77.1 kg (170 lb)  09/10/24 : 80 kg (176 lb 5.9 oz)  09/09/24 : 78 kg (171 lb 15.3 oz)  08/20/24 : 77.1 kg (170 lb)  08/12/24 : 78.3 kg (172 lb 9.6 oz)  07/31/24 : 78 kg (172 lb)  05/08/24 : 77.1 kg (170 lb)  04/27/24 : 77.1 kg (170 lb)  04/19/24 : 79 kg (174 lb 1.6 oz)  02/08/24 : 81 kg (178 lb 9.2 oz)  02/07/24 : 78 kg (171 lb 15.3 oz)  01/08/24 : 79.4 kg (175 lb)     Weight Change %:  Weight History / % Weight Change: [facility] records show a 6 lb, or 3%, wt loss in 1 year. Some fluctuations present.  Significant Weight Loss: No    Nutrition Focused Physical Exam Findings:  Subcutaneous Fat Loss:   Orbital Fat Pads: Well nourished (slightly bulging fat pads)  Buccal Fat Pads: Well nourished (full, rounded cheeks)  Muscle Wasting:  Temporalis: Mild-Moderate (slight depression)  Pectoralis (Clavicular Region): Defer  Interosseous: Mild-Moderate " (slightly depressed area between thumb and forefinger)  Physical Findings:  Skin: Positive  Positive Skin Findings: Unstageable pressure injury, Impaired wound healing (PIs present on buttocks and R upper leg)    Nutrition Significant Labs:  CBC Trend:   Results from last 7 days   Lab Units 03/03/25  0515 03/02/25  0933   WBC AUTO x10*3/uL 13.2* 9.1   RBC AUTO x10*6/uL 4.23* 4.64   HEMOGLOBIN g/dL 13.2* 14.1   HEMATOCRIT % 39.9* 42.8   MCV fL 94 92   PLATELETS AUTO x10*3/uL 303 311    , Renal Lab Trend:   Results from last 7 days   Lab Units 03/03/25  0515 03/02/25  0933   POTASSIUM mmol/L 3.5 4.0   SODIUM mmol/L 133* 131*   MAGNESIUM mg/dL 1.83  --    EGFR mL/min/1.73m*2 >90 >90   BUN mg/dL 9 8   CREATININE mg/dL 0.62 0.55      Nutrition Specific Medications:  Scheduled medications  atorvastatin, 80 mg, oral, Nightly  baclofen, 10 mg, oral, TID  carBAMazepine, 200 mg, oral, BID  fondaparinux, 2.5 mg, subcutaneous, q24h  gabapentin, 600 mg, oral, TID  methIMAzole, 5 mg, oral, TID  metoprolol succinate XL, 25 mg, oral, Daily      Continuous medications  dextrose 5%-0.45 % sodium chloride, 100 mL/hr, Last Rate: 100 mL/hr (03/03/25 1147)  dextrose 5%-0.45 % sodium chloride, 100 mL/hr, Last Rate: 100 mL/hr (03/03/25 1156)      PRN medications  PRN medications: acetaminophen **OR** acetaminophen **OR** acetaminophen, atropine, HYDROcodone-acetaminophen, ipratropium-albuteroL, melatonin, oxygen, polyethylene glycol     I/O:   Last BM Date: 03/02/25; Stool Appearance: Loose, Soft (03/02/25 1821)    Dietary Orders (From admission, onward)       Start     Ordered    03/02/25 1421  May Participate in Room Service  ( ROOM SERVICE MAY PARTICIPATE)  Once        Question:  .  Answer:  Yes    03/02/25 1420 03/02/25 1404  NPO Diet Except: Sips with meds; Effective now  Diet effective now        Question:  Except:  Answer:  Sips with meds    03/02/25 8624                     Estimated Needs:   Total Energy Estimated Needs in 24  hours (kCal):  (9327-2737)  Method for Estimating Needs: 25-30 kcal/kg  Total Protein Estimated Needs in 24 Hours (g):  ()  Method for Estimating 24 Hour Protein Needs: 1.2-1.5 g/kg ABW  Total Fluid Estimated Needs in 24 Hours (mL): 2350 mL  Method for Estimating 24 Hour Fluid Needs: 30 ml/kcal        Nutrition Diagnosis   Malnutrition Diagnosis  Patient has Malnutrition Diagnosis: No    Nutrition Diagnosis  Patient has Nutrition Diagnosis: Yes  Diagnosis Status (1): New  Nutrition Diagnosis 1: Increased nutrient needs  Related to (1): increased demand for protein secondary to wound healing  As Evidenced by (1): multiple PIs (R upper leg, buttocks)       Nutrition Interventions/Recommendations   Nutrition prescription for oral nutrition    Nutrition Recommendations:  Individualized Nutrition Prescription Provided for : 9320-2532 kcal and  g protein to be provided by diet and supplement regimen daily.    Nutrition Interventions/Goals:   Interventions: Meals and snacks, Medical food supplement  Meals and Snacks: General healthful diet  Goal: Progression to regular diet as appropriate.  Medical Food Supplement: Commercial beverage medical food supplement therapy  Goal: Ensure Plus (vanilla per pt preference) BID with meals to provide an additional 700 kcal and 40 g protein daily.       Nutrition Monitoring and Evaluation   Food/Nutrient Related History Monitoring  Monitoring and Evaluation Plan: Intake / amount of food  Intake / Amount of food: Consumes at least 75% or more of meals/snacks/supplements    Anthropometric Measurements  Monitoring and Evaluation Plan: Body weight  Body Weight: Body weight - Maintain stable weight    Biochemical Data, Medical Tests and Procedures  Monitoring and Evaluation Plan: Electrolyte/renal panel  Electrolyte and Renal Panel: Electrolytes within normal limits    Physical Exam Findings  Monitoring and Evaluation Plan: Skin  Skin Finding: Impaired wound healing - Improved  wound healing    Goal Status: New goal(s) identified    Time Spent (min): 60 minutes

## 2025-03-04 ENCOUNTER — APPOINTMENT (OUTPATIENT)
Dept: RADIOLOGY | Facility: HOSPITAL | Age: 65
DRG: 390 | End: 2025-03-04
Payer: MEDICARE

## 2025-03-04 VITALS
OXYGEN SATURATION: 98 % | SYSTOLIC BLOOD PRESSURE: 162 MMHG | WEIGHT: 172.62 LBS | BODY MASS INDEX: 22.88 KG/M2 | RESPIRATION RATE: 18 BRPM | TEMPERATURE: 97.3 F | DIASTOLIC BLOOD PRESSURE: 104 MMHG | HEART RATE: 99 BPM | HEIGHT: 73 IN

## 2025-03-04 PROBLEM — K56.7 ILEUS (MULTI): Status: RESOLVED | Noted: 2025-03-02 | Resolved: 2025-03-04

## 2025-03-04 LAB
ANION GAP SERPL CALC-SCNC: 8 MMOL/L (ref 10–20)
BACTERIA UR CULT: NORMAL
BUN SERPL-MCNC: 6 MG/DL (ref 6–23)
CALCIUM SERPL-MCNC: 7.8 MG/DL (ref 8.6–10.3)
CHLORIDE SERPL-SCNC: 102 MMOL/L (ref 98–107)
CO2 SERPL-SCNC: 24 MMOL/L (ref 21–32)
CREAT SERPL-MCNC: 0.45 MG/DL (ref 0.5–1.3)
EGFRCR SERPLBLD CKD-EPI 2021: >90 ML/MIN/1.73M*2
ERYTHROCYTE [DISTWIDTH] IN BLOOD BY AUTOMATED COUNT: 13.9 % (ref 11.5–14.5)
GLUCOSE SERPL-MCNC: 129 MG/DL (ref 74–99)
HCT VFR BLD AUTO: 35.8 % (ref 41–52)
HGB BLD-MCNC: 11.2 G/DL (ref 13.5–17.5)
MAGNESIUM SERPL-MCNC: 1.77 MG/DL (ref 1.6–2.4)
MCH RBC QN AUTO: 30.3 PG (ref 26–34)
MCHC RBC AUTO-ENTMCNC: 31.3 G/DL (ref 32–36)
MCV RBC AUTO: 97 FL (ref 80–100)
NRBC BLD-RTO: 0 /100 WBCS (ref 0–0)
PLATELET # BLD AUTO: 271 X10*3/UL (ref 150–450)
POTASSIUM SERPL-SCNC: 3 MMOL/L (ref 3.5–5.3)
RBC # BLD AUTO: 3.7 X10*6/UL (ref 4.5–5.9)
SARS-COV-2 RNA RESP QL NAA+PROBE: NOT DETECTED
SODIUM SERPL-SCNC: 131 MMOL/L (ref 136–145)
WBC # BLD AUTO: 7.6 X10*3/UL (ref 4.4–11.3)

## 2025-03-04 PROCEDURE — 94640 AIRWAY INHALATION TREATMENT: CPT

## 2025-03-04 PROCEDURE — 2500000001 HC RX 250 WO HCPCS SELF ADMINISTERED DRUGS (ALT 637 FOR MEDICARE OP): Performed by: STUDENT IN AN ORGANIZED HEALTH CARE EDUCATION/TRAINING PROGRAM

## 2025-03-04 PROCEDURE — 36415 COLL VENOUS BLD VENIPUNCTURE: CPT | Performed by: NURSE PRACTITIONER

## 2025-03-04 PROCEDURE — 74018 RADEX ABDOMEN 1 VIEW: CPT

## 2025-03-04 PROCEDURE — 85027 COMPLETE CBC AUTOMATED: CPT | Performed by: NURSE PRACTITIONER

## 2025-03-04 PROCEDURE — 87635 SARS-COV-2 COVID-19 AMP PRB: CPT | Performed by: NURSE PRACTITIONER

## 2025-03-04 PROCEDURE — 2500000004 HC RX 250 GENERAL PHARMACY W/ HCPCS (ALT 636 FOR OP/ED): Performed by: STUDENT IN AN ORGANIZED HEALTH CARE EDUCATION/TRAINING PROGRAM

## 2025-03-04 PROCEDURE — 94761 N-INVAS EAR/PLS OXIMETRY MLT: CPT

## 2025-03-04 PROCEDURE — 99232 SBSQ HOSP IP/OBS MODERATE 35: CPT | Performed by: SURGERY

## 2025-03-04 PROCEDURE — 99238 HOSP IP/OBS DSCHRG MGMT 30/<: CPT | Performed by: NURSE PRACTITIONER

## 2025-03-04 PROCEDURE — 2500000001 HC RX 250 WO HCPCS SELF ADMINISTERED DRUGS (ALT 637 FOR MEDICARE OP): Performed by: NURSE PRACTITIONER

## 2025-03-04 PROCEDURE — 2500000005 HC RX 250 GENERAL PHARMACY W/O HCPCS: Performed by: STUDENT IN AN ORGANIZED HEALTH CARE EDUCATION/TRAINING PROGRAM

## 2025-03-04 PROCEDURE — 83735 ASSAY OF MAGNESIUM: CPT | Performed by: NURSE PRACTITIONER

## 2025-03-04 PROCEDURE — 2500000002 HC RX 250 W HCPCS SELF ADMINISTERED DRUGS (ALT 637 FOR MEDICARE OP, ALT 636 FOR OP/ED): Performed by: NURSE PRACTITIONER

## 2025-03-04 PROCEDURE — 80048 BASIC METABOLIC PNL TOTAL CA: CPT | Performed by: NURSE PRACTITIONER

## 2025-03-04 PROCEDURE — 2500000002 HC RX 250 W HCPCS SELF ADMINISTERED DRUGS (ALT 637 FOR MEDICARE OP, ALT 636 FOR OP/ED): Performed by: STUDENT IN AN ORGANIZED HEALTH CARE EDUCATION/TRAINING PROGRAM

## 2025-03-04 RX ORDER — AMOXICILLIN 250 MG
3 CAPSULE ORAL NIGHTLY
Status: DISCONTINUED | OUTPATIENT
Start: 2025-03-04 | End: 2025-03-04 | Stop reason: HOSPADM

## 2025-03-04 RX ORDER — AMOXICILLIN 250 MG
3 CAPSULE ORAL NIGHTLY
Qty: 90 TABLET | Refills: 0 | Status: SHIPPED | OUTPATIENT
Start: 2025-03-04 | End: 2025-04-03

## 2025-03-04 RX ORDER — POTASSIUM CHLORIDE 20 MEQ/1
40 TABLET, EXTENDED RELEASE ORAL ONCE
Status: COMPLETED | OUTPATIENT
Start: 2025-03-04 | End: 2025-03-04

## 2025-03-04 RX ORDER — HYDROCODONE BITARTRATE AND ACETAMINOPHEN 5; 325 MG/1; MG/1
1 TABLET ORAL 2 TIMES DAILY
Qty: 4 TABLET | Refills: 0 | Status: SHIPPED | OUTPATIENT
Start: 2025-03-04 | End: 2025-03-06

## 2025-03-04 RX ORDER — DEXTROMETHORPHAN POLISTIREX 30 MG/5 ML
1 SUSPENSION, EXTENDED RELEASE 12 HR ORAL ONCE
Status: COMPLETED | OUTPATIENT
Start: 2025-03-04 | End: 2025-03-04

## 2025-03-04 RX ADMIN — Medication 3 L/MIN: at 00:36

## 2025-03-04 RX ADMIN — IPRATROPIUM BROMIDE AND ALBUTEROL SULFATE 3 ML: 2.5; .5 SOLUTION RESPIRATORY (INHALATION) at 07:07

## 2025-03-04 RX ADMIN — METHIMAZOLE 5 MG: 5 TABLET ORAL at 08:49

## 2025-03-04 RX ADMIN — BACLOFEN 10 MG: 10 TABLET ORAL at 08:49

## 2025-03-04 RX ADMIN — Medication 2 L/MIN: at 07:07

## 2025-03-04 RX ADMIN — METOPROLOL SUCCINATE 25 MG: 25 TABLET, EXTENDED RELEASE ORAL at 08:49

## 2025-03-04 RX ADMIN — METHIMAZOLE 5 MG: 5 TABLET ORAL at 15:31

## 2025-03-04 RX ADMIN — GABAPENTIN 600 MG: 300 CAPSULE ORAL at 15:15

## 2025-03-04 RX ADMIN — FONDAPARINUX SODIUM 2.5 MG: 2.5 INJECTION SUBCUTANEOUS at 15:15

## 2025-03-04 RX ADMIN — BACLOFEN 10 MG: 10 TABLET ORAL at 15:15

## 2025-03-04 RX ADMIN — HYDROCODONE BITARTRATE AND ACETAMINOPHEN 1 TABLET: 5; 325 TABLET ORAL at 08:49

## 2025-03-04 RX ADMIN — POTASSIUM CHLORIDE 40 MEQ: 1500 TABLET, EXTENDED RELEASE ORAL at 08:49

## 2025-03-04 RX ADMIN — IPRATROPIUM BROMIDE AND ALBUTEROL SULFATE 3 ML: 2.5; .5 SOLUTION RESPIRATORY (INHALATION) at 00:59

## 2025-03-04 RX ADMIN — MINERAL OIL 1 ENEMA: 100 ENEMA RECTAL at 15:33

## 2025-03-04 RX ADMIN — GABAPENTIN 600 MG: 300 CAPSULE ORAL at 08:49

## 2025-03-04 RX ADMIN — CARBAMAZEPINE 200 MG: 200 TABLET ORAL at 08:49

## 2025-03-04 ASSESSMENT — PAIN SCALES - GENERAL
PAINLEVEL_OUTOF10: 7
PAINLEVEL_OUTOF10: 0 - NO PAIN

## 2025-03-04 ASSESSMENT — COGNITIVE AND FUNCTIONAL STATUS - GENERAL
MOBILITY SCORE: 7
DAILY ACTIVITIY SCORE: 10
DRESSING REGULAR UPPER BODY CLOTHING: TOTAL
HELP NEEDED FOR BATHING: TOTAL
STANDING UP FROM CHAIR USING ARMS: TOTAL
TURNING FROM BACK TO SIDE WHILE IN FLAT BAD: TOTAL
PERSONAL GROOMING: A LITTLE
CLIMB 3 TO 5 STEPS WITH RAILING: TOTAL
MOVING TO AND FROM BED TO CHAIR: TOTAL
WALKING IN HOSPITAL ROOM: TOTAL
TOILETING: TOTAL
EATING MEALS: A LITTLE
DRESSING REGULAR LOWER BODY CLOTHING: TOTAL
MOVING FROM LYING ON BACK TO SITTING ON SIDE OF FLAT BED WITH BEDRAILS: A LOT

## 2025-03-04 ASSESSMENT — PAIN - FUNCTIONAL ASSESSMENT
PAIN_FUNCTIONAL_ASSESSMENT: 0-10
PAIN_FUNCTIONAL_ASSESSMENT: 0-10

## 2025-03-04 ASSESSMENT — PAIN DESCRIPTION - DESCRIPTORS: DESCRIPTORS: ACHING

## 2025-03-04 ASSESSMENT — PAIN DESCRIPTION - LOCATION: LOCATION: LEG

## 2025-03-04 NOTE — DISCHARGE SUMMARY
Discharge Diagnosis  Ileus (Multi)    Issues Requiring Follow-Up  MS  Immobility  Neurogenic bladder    Discharge Meds     Medication List      CONTINUE taking these medications     acetaminophen 325 mg tablet; Commonly known as: Tylenol; Take 2 tablets   (650 mg) by mouth every 4 hours if needed for moderate pain (4 - 6).   albuterol 2.5 mg /3 mL (0.083 %) nebulizer solution; Take 3 mL (2.5 mg)   by nebulization every 6 hours if needed for wheezing.   atorvastatin 80 mg tablet; Commonly known as: Lipitor   baclofen 10 mg tablet; Commonly known as: Lioresal   BOOST PLUS ORAL   carBAMazepine 200 mg tablet; Commonly known as: TEGretol   DULCOLAX (BISACODYL) RECT   FLEET ENEMA RECT   gabapentin 600 mg tablet; Commonly known as: Neurontin   HYDROcodone-acetaminophen 5-325 mg tablet; Commonly known as: Norco   lactobacillus acidophilus capsule   loperamide 2 mg capsule; Commonly known as: Imodium   methIMAzole 5 mg tablet; Commonly known as: Tapazole   metoprolol succinate XL 25 mg 24 hr tablet; Commonly known as: Toprol-XL   ondansetron 4 mg tablet; Commonly known as: Zofran   polyethylene glycol 17 gram packet; Commonly known as: Glycolax, Miralax   STRESS B PLUS ZINC ORAL     STOP taking these medications     COVID-19 AT-HOME TEST MISC   docusate sodium 100 mg capsule; Commonly known as: Colace       Test Results Pending At Discharge  Pending Labs       No current pending labs.            Hospital Course   Raheem Hilton is a 64 y.o. male presenting with abdominal pain for the last 3 days.  He last had a bowel movement 3 days ago.  He is coming from a nursing home which he has been out for 12 years.  He has advanced multiple sclerosis.  He is bedbound.   Case was discussed in detail with general surgeon, Dr. Cash.  She recommends trialing the patient on neostigmine and then performing colonoscope decompression if he does not improve with this.     Workup in the emergency department showed CT abdomen pelvis showing  dilatation of small and large bowel without definite findings of obstruction.  CBC showed white blood cell count of 9.1, hemoglobin of 14.1, platelet count of 311.  CMP was unremarkable.  Lactate was 1.0.  Lipase was 14.  Urinalysis showed 1+ nitrites and 500 leukocyte esterase with greater than 50 white blood cells but 1+ bacteria.  Patient denies any symptoms at this time and denies fever, chills, sweats, suprapubic pain.  Patient will be admitted to hospital for further management of ileus started on neostigmine with consultation to general surgery.    -Consult general surgery.    -Patient normally has a bowel movement every 3 days.  Uses MiraLAX daily will switch Colace to Melisa-Colace.  He does have as needed enemas available.  Aggressive bowel regimen as recommended.  -Neostigmine given 3/2/2025.  He did have a large bowel movement at that time.     -Diet was advanced.  However, today patient reports he feels like he has to have a bowel movement and getting more distended.  Stat repeat x-ray was completed.  It showed significant stool in his colon.  We will give him a mineral oil enema before he is discharged today.  -CBC, BMP have remained stable.           2.  Multiple sclerosis: Continue supportive care.  Patient is followed with neurology outpatient.  Patient has neurogenic bladder and has a [place] catheter.  His urine culture was negative for infection     3.  Hypertension: Blood pressure stable at this time.  Continue metoprolol.     4.  Bedbound     5.  Hyperlipidemia: Continue statin     6.  History of ostomy     7.  History of splenectomy     8.  History of tobacco abuse: Patient quit smoking 12 years ago  9.  Hypokalemia; this was replaced orally today the need to follow fluid and electrolyte status.    Pertinent Physical Exam At Time of Discharge  Physical Exam  General:     Well appearing  HENT:      Head: Normocephalic and atraumatic.      Mouth: Mucous membranes are moist.   Eyes:      Pupils are  equal, round, and reactive to light.   Cardiovascular:      Normal rate and regular rhythm. Normal S1, S2.  No murmurs, clicks, gallops.   Pulmonary:      Clear to auscultation bilaterally.  No wheezing, rhonchi, or crackles heard.   Abdominal:      Decreased bowel sounds.  Suprapubic catheter in place.  Slight distention with tympany on percussion. nontender to palpation.  Skin:      No lesions seen  Musculoskeletal:         Extremities: No edema present. No deformities with no abnormal range of motion     Neck supple.   Neurological:      Mental Status: Patient is alert and oriented X3     CN II-XII intact.  No focal neurologic deficits appreciated.   Outpatient Follow-Up  No future appointments.      Paula Diaz, APRN-CNP

## 2025-03-04 NOTE — DISCHARGE INSTRUCTIONS
Foam dressing to buttocks, change every 7 days and as needed, changed 3/2    Wound Team consult to buttocks

## 2025-03-04 NOTE — DISCHARGE INSTR - OTHER ORDERS
Device Nasal Cannula    Rate in liters per minute: Other    Custom Value: 1-4    Keep O2 Sat Above: 90%      Currently on 3 LPM NC.    Daily am pulse ox at 0800

## 2025-03-04 NOTE — PROGRESS NOTES
Care Transitions: Met with patient at bedside. Medicare IMM reviewed, patient signed, copy provided, original placed in chart. Voiced understanding. Carlotta Zamora RN/TCC

## 2025-03-04 NOTE — PROGRESS NOTES
"General Surgery Progress Note    Denies abdominal pain.  Tolerated clears last night without any nausea or abdominal distention.  No additional bowel movements after the very large bowel movement following neostigmine. KUB yesterday showed improvement in colonic distention.    /75 (BP Location: Right arm, Patient Position: Lying)   Pulse 76   Temp 36.4 °C (97.5 °F) (Temporal)   Resp 18   Ht 1.854 m (6' 0.99\")   Wt 78.3 kg (172 lb 9.9 oz)   SpO2 97%   BMI 22.78 kg/m²   NAD, laying supine in bed   No labored breathing, on room air   NSR  Abdomen soft, non-distended although tympanic in epigastric region, this is less tympanic than yesterday, he is non-tender on exam    Intake/Output Summary (Last 24 hours) at 3/4/2025 0657  Last data filed at 3/4/2025 0639  Gross per 24 hour   Intake 3826.67 ml   Output 800 ml   Net 3026.67 ml     K 3.0    Patient is a 64 y.o. male with colonic ileus, improved following neostigmine. At risk for recurrence with underlying MS and non-ambulatory status. Recommend potassium replacement. Okay to advance diet. No indication for surgical intervention so I will sign off, but available if I can be of further assistance. No need for surgical follow up.    Aleksandra Cash MD  03/04/25  7:00 AM            "

## 2025-03-04 NOTE — CARE PLAN
Problem: Pain - Adult  Goal: Verbalizes/displays adequate comfort level or baseline comfort level  Outcome: Progressing     Problem: Safety - Adult  Goal: Free from fall injury  Outcome: Progressing     Problem: Discharge Planning  Goal: Discharge to home or other facility with appropriate resources  Outcome: Progressing     Problem: Chronic Conditions and Co-morbidities  Goal: Patient's chronic conditions and co-morbidity symptoms are monitored and maintained or improved  Outcome: Progressing     Problem: Nutrition  Goal: Nutrient intake appropriate for maintaining nutritional needs  Outcome: Progressing     Problem: Skin  Goal: Decreased wound size/increased tissue granulation at next dressing change  Outcome: Progressing  Flowsheets (Taken 3/4/2025 1113)  Decreased wound size/increased tissue granulation at next dressing change: Promote sleep for wound healing  Goal: Participates in plan/prevention/treatment measures  Outcome: Progressing  Flowsheets (Taken 3/4/2025 1113)  Participates in plan/prevention/treatment measures: Increase activity/out of bed for meals  Goal: Prevent/manage excess moisture  Outcome: Progressing  Flowsheets (Taken 3/4/2025 1113)  Prevent/manage excess moisture: Moisturize dry skin  Goal: Prevent/minimize sheer/friction injuries  Outcome: Progressing  Flowsheets (Taken 3/4/2025 1113)  Prevent/minimize sheer/friction injuries: Use pull sheet  Goal: Promote/optimize nutrition  Outcome: Progressing  Flowsheets (Taken 3/4/2025 1113)  Promote/optimize nutrition: Consume > 50% meals/supplements  Goal: Promote skin healing  Outcome: Progressing  Flowsheets (Taken 3/4/2025 1113)  Promote skin healing: Turn/reposition every 2 hours/use positioning/transfer devices     Problem: Pain  Goal: Takes deep breaths with improved pain control throughout the shift  Outcome: Progressing  Goal: Turns in bed with improved pain control throughout the shift  Outcome: Progressing  Goal: Walks with improved pain  control throughout the shift  Outcome: Progressing  Goal: Performs ADL's with improved pain control throughout shift  Outcome: Progressing  Goal: Participates in PT with improved pain control throughout the shift  Outcome: Progressing  Goal: Free from opioid side effects throughout the shift  Outcome: Progressing  Goal: Free from acute confusion related to pain meds throughout the shift  Outcome: Progressing   The patient's goals for the shift include feel better    The clinical goals for the shift include pain control    Over the shift, the patient did not make progress toward the following goals. Barriers to progression include . Recommendations to address these barriers include .

## 2025-03-04 NOTE — PROGRESS NOTES
Music Therapy Note    Raheem Hilton was referred by Alicia Soliz, REEMA.     Therapy Session  Referral Type: Referral from previous admission  Session Start Time: 1629  Session End Time: 1629  Intervention Delivery: In-person  Conflict of Service: None  Family Present for Session: None     Pre-assessment  Unable to Assess Reason: Outcomes not assessed  Mood/Affect: Tired/lethargic         Treatment/Interventions  Music Therapy Interventions: Assessment    Post-assessment  Unable to Assess Reason: Did not provide expressive therapy intervention  Mood/Affect: Tired/lethargic  Total Session Time (min): 0 minutes    Narrative  Assessment Detail: Pt found resting in bed, awake, upon arrival of music therapist. Pt expressed having a difficult day due to fluid/diet restriction. Pt declined music therapy session for today, and states maybe tomorrow.  Follow-up: MT will follow-up as applicable.    Education Documentation  No documentation found.        Expressive Therapies Note

## 2025-03-04 NOTE — PROGRESS NOTES
Pt is ready for discharge today; return to [facility] where he resides for long-term care. Final updates/orders attached in [facility] and [facility] to arrange transport. No further needs identified.    CEDRIC Jain

## 2025-03-04 NOTE — CARE PLAN
The patient's goals for the shift include feel better    The clinical goals for the shift include pain control      Problem: Pain - Adult  Goal: Verbalizes/displays adequate comfort level or baseline comfort level  Outcome: Progressing     Problem: Safety - Adult  Goal: Free from fall injury  Outcome: Progressing     Problem: Discharge Planning  Goal: Discharge to home or other facility with appropriate resources  Outcome: Progressing     Problem: Chronic Conditions and Co-morbidities  Goal: Patient's chronic conditions and co-morbidity symptoms are monitored and maintained or improved  Outcome: Progressing     Problem: Nutrition  Goal: Nutrient intake appropriate for maintaining nutritional needs  Outcome: Progressing     Problem: Skin  Goal: Decreased wound size/increased tissue granulation at next dressing change  Outcome: Progressing  Goal: Participates in plan/prevention/treatment measures  Outcome: Progressing  Goal: Prevent/manage excess moisture  Outcome: Progressing  Goal: Prevent/minimize sheer/friction injuries  Outcome: Progressing  Goal: Promote/optimize nutrition  Outcome: Progressing  Goal: Promote skin healing  Outcome: Progressing     Problem: Pain  Goal: Takes deep breaths with improved pain control throughout the shift  Outcome: Progressing  Goal: Turns in bed with improved pain control throughout the shift  Outcome: Progressing  Goal: Walks with improved pain control throughout the shift  Outcome: Progressing  Goal: Performs ADL's with improved pain control throughout shift  Outcome: Progressing  Goal: Participates in PT with improved pain control throughout the shift  Outcome: Progressing  Goal: Free from opioid side effects throughout the shift  Outcome: Progressing  Goal: Free from acute confusion related to pain meds throughout the shift  Outcome: Progressing

## 2025-04-05 ENCOUNTER — HOSPITAL ENCOUNTER (EMERGENCY)
Facility: HOSPITAL | Age: 65
Discharge: HOME | End: 2025-04-05
Attending: EMERGENCY MEDICINE
Payer: MEDICARE

## 2025-04-05 ENCOUNTER — APPOINTMENT (OUTPATIENT)
Dept: RADIOLOGY | Facility: HOSPITAL | Age: 65
End: 2025-04-05
Payer: MEDICARE

## 2025-04-05 VITALS
WEIGHT: 172.3 LBS | HEIGHT: 72 IN | DIASTOLIC BLOOD PRESSURE: 96 MMHG | BODY MASS INDEX: 23.34 KG/M2 | HEART RATE: 75 BPM | SYSTOLIC BLOOD PRESSURE: 150 MMHG | OXYGEN SATURATION: 90 % | TEMPERATURE: 98.3 F | RESPIRATION RATE: 12 BRPM

## 2025-04-05 DIAGNOSIS — K62.89 PROCTITIS: ICD-10-CM

## 2025-04-05 DIAGNOSIS — R10.9 ABDOMINAL PAIN, UNSPECIFIED ABDOMINAL LOCATION: Primary | ICD-10-CM

## 2025-04-05 LAB
ALBUMIN SERPL BCP-MCNC: 3.8 G/DL (ref 3.4–5)
ALP SERPL-CCNC: 107 U/L (ref 33–136)
ALT SERPL W P-5'-P-CCNC: 17 U/L (ref 10–52)
ANION GAP SERPL CALC-SCNC: 11 MMOL/L (ref 10–20)
APPEARANCE UR: CLEAR
AST SERPL W P-5'-P-CCNC: 17 U/L (ref 9–39)
BASOPHILS # BLD AUTO: 0.07 X10*3/UL (ref 0–0.1)
BASOPHILS NFR BLD AUTO: 0.9 %
BILIRUB SERPL-MCNC: 0.3 MG/DL (ref 0–1.2)
BILIRUB UR STRIP.AUTO-MCNC: NEGATIVE MG/DL
BUN SERPL-MCNC: 8 MG/DL (ref 6–23)
CALCIUM SERPL-MCNC: 8.6 MG/DL (ref 8.6–10.3)
CHLORIDE SERPL-SCNC: 98 MMOL/L (ref 98–107)
CO2 SERPL-SCNC: 26 MMOL/L (ref 21–32)
COLOR UR: COLORLESS
CREAT SERPL-MCNC: 0.47 MG/DL (ref 0.5–1.3)
EGFRCR SERPLBLD CKD-EPI 2021: >90 ML/MIN/1.73M*2
EOSINOPHIL # BLD AUTO: 1.1 X10*3/UL (ref 0–0.7)
EOSINOPHIL NFR BLD AUTO: 13.8 %
ERYTHROCYTE [DISTWIDTH] IN BLOOD BY AUTOMATED COUNT: 13.6 % (ref 11.5–14.5)
GLUCOSE SERPL-MCNC: 96 MG/DL (ref 74–99)
GLUCOSE UR STRIP.AUTO-MCNC: NORMAL MG/DL
HCT VFR BLD AUTO: 41.4 % (ref 41–52)
HGB BLD-MCNC: 13.6 G/DL (ref 13.5–17.5)
HOLD SPECIMEN: NORMAL
IMM GRANULOCYTES # BLD AUTO: 0.02 X10*3/UL (ref 0–0.7)
IMM GRANULOCYTES NFR BLD AUTO: 0.3 % (ref 0–0.9)
KETONES UR STRIP.AUTO-MCNC: NEGATIVE MG/DL
LACTATE SERPL-SCNC: 0.9 MMOL/L (ref 0.4–2)
LEUKOCYTE ESTERASE UR QL STRIP.AUTO: ABNORMAL
LYMPHOCYTES # BLD AUTO: 1.21 X10*3/UL (ref 1.2–4.8)
LYMPHOCYTES NFR BLD AUTO: 15.1 %
MCH RBC QN AUTO: 30.7 PG (ref 26–34)
MCHC RBC AUTO-ENTMCNC: 32.9 G/DL (ref 32–36)
MCV RBC AUTO: 94 FL (ref 80–100)
MONOCYTES # BLD AUTO: 1.3 X10*3/UL (ref 0.1–1)
MONOCYTES NFR BLD AUTO: 16.3 %
NEUTROPHILS # BLD AUTO: 4.3 X10*3/UL (ref 1.2–7.7)
NEUTROPHILS NFR BLD AUTO: 53.6 %
NITRITE UR QL STRIP.AUTO: NEGATIVE
NRBC BLD-RTO: 0 /100 WBCS (ref 0–0)
PH UR STRIP.AUTO: 8 [PH]
PLATELET # BLD AUTO: 285 X10*3/UL (ref 150–450)
POTASSIUM SERPL-SCNC: 3.7 MMOL/L (ref 3.5–5.3)
PROT SERPL-MCNC: 6.3 G/DL (ref 6.4–8.2)
PROT UR STRIP.AUTO-MCNC: NEGATIVE MG/DL
RBC # BLD AUTO: 4.43 X10*6/UL (ref 4.5–5.9)
RBC # UR STRIP.AUTO: NEGATIVE MG/DL
RBC #/AREA URNS AUTO: ABNORMAL /HPF
SODIUM SERPL-SCNC: 131 MMOL/L (ref 136–145)
SP GR UR STRIP.AUTO: 1
UROBILINOGEN UR STRIP.AUTO-MCNC: NORMAL MG/DL
WBC # BLD AUTO: 8 X10*3/UL (ref 4.4–11.3)
WBC #/AREA URNS AUTO: ABNORMAL /HPF
WBC CLUMPS #/AREA URNS AUTO: ABNORMAL /HPF

## 2025-04-05 PROCEDURE — 87086 URINE CULTURE/COLONY COUNT: CPT | Mod: SAMLAB | Performed by: EMERGENCY MEDICINE

## 2025-04-05 PROCEDURE — 99285 EMERGENCY DEPT VISIT HI MDM: CPT | Mod: 25 | Performed by: EMERGENCY MEDICINE

## 2025-04-05 PROCEDURE — 2550000001 HC RX 255 CONTRASTS: Performed by: EMERGENCY MEDICINE

## 2025-04-05 PROCEDURE — 80053 COMPREHEN METABOLIC PANEL: CPT | Performed by: EMERGENCY MEDICINE

## 2025-04-05 PROCEDURE — 71045 X-RAY EXAM CHEST 1 VIEW: CPT

## 2025-04-05 PROCEDURE — 83605 ASSAY OF LACTIC ACID: CPT | Performed by: EMERGENCY MEDICINE

## 2025-04-05 PROCEDURE — 71045 X-RAY EXAM CHEST 1 VIEW: CPT | Performed by: STUDENT IN AN ORGANIZED HEALTH CARE EDUCATION/TRAINING PROGRAM

## 2025-04-05 PROCEDURE — 74177 CT ABD & PELVIS W/CONTRAST: CPT | Performed by: RADIOLOGY

## 2025-04-05 PROCEDURE — 87040 BLOOD CULTURE FOR BACTERIA: CPT | Mod: SAMLAB | Performed by: EMERGENCY MEDICINE

## 2025-04-05 PROCEDURE — 81001 URINALYSIS AUTO W/SCOPE: CPT | Performed by: EMERGENCY MEDICINE

## 2025-04-05 PROCEDURE — 36415 COLL VENOUS BLD VENIPUNCTURE: CPT | Performed by: EMERGENCY MEDICINE

## 2025-04-05 PROCEDURE — 87075 CULTR BACTERIA EXCEPT BLOOD: CPT | Mod: SAMLAB | Performed by: EMERGENCY MEDICINE

## 2025-04-05 PROCEDURE — 85025 COMPLETE CBC W/AUTO DIFF WBC: CPT | Performed by: EMERGENCY MEDICINE

## 2025-04-05 PROCEDURE — 74177 CT ABD & PELVIS W/CONTRAST: CPT

## 2025-04-05 RX ORDER — HYDROCODONE BITARTRATE AND ACETAMINOPHEN 5; 325 MG/1; MG/1
1 TABLET ORAL 2 TIMES DAILY
COMMUNITY

## 2025-04-05 RX ORDER — MENTHOL 40 MG/ML
1 GEL TOPICAL EVERY 6 HOURS PRN
COMMUNITY

## 2025-04-05 RX ADMIN — IOHEXOL 68 ML: 350 INJECTION, SOLUTION INTRAVENOUS at 09:25

## 2025-04-05 ASSESSMENT — PAIN SCALES - GENERAL
PAINLEVEL_OUTOF10: 7
PAINLEVEL_OUTOF10: 7

## 2025-04-05 ASSESSMENT — PAIN - FUNCTIONAL ASSESSMENT: PAIN_FUNCTIONAL_ASSESSMENT: 0-10

## 2025-04-05 ASSESSMENT — PAIN DESCRIPTION - LOCATION: LOCATION: ABDOMEN

## 2025-04-05 NOTE — ED PROVIDER NOTES
HPI   Chief Complaint   Patient presents with    Abdominal Pain     Abd pain and nausea since last night.        Limitations to History: None    HPI: 65-year-old male presents from local nursing facility with concern for fever.  Patient does get frequent urosepsis.  History of suprapubic catheter.  Lower extremity paralysis.  Patient states he was feeling unwell beginning this morning.  No vomiting.  Reported fever of 101 °F from nursing facility.  Denies any chest pain, shortness of breath.  Admits of diffuse abdominal pain.  Denies any headache, neck pain.    Additional History Obtained from: EMS    ------------------------------------------------------------------------------------------------------------------------------------------  Physical Exam:    VS: As documented in the triage note and EMR flowsheet from this visit were reviewed.    Appearance: Alert. cooperative,  in no acute distress.   Skin: Intact,  dry skin, no lesions, rash, petechiae or purpura.   Eyes: PERRLA, EOMs intact,  Conjunctiva pink with no redness or exudates.   HENT: Normocephalic, atraumatic. Nares patent. No intraoral lesions.   Neck: Supple, without meningismus. Trachea at midline. No lymphadenopathy.  Pulmonary: Clear bilaterally with good chest wall excursion. No rales, rhonchi or wheezing. No accessory muscle use or stridor.  Cardiac: Regular rate and rhythm, no rubs, murmurs, or gallops.   Abdomen: Abdomen is soft, nontender, and nondistended.  No palpable organomegaly.  No rebound or guarding.  No CVA tenderness. Nonsurgical abdomen.  Genitourinary: Suprapubic catheter with mild surrounding erythema.  No drainage.    Musculoskeletal: Full range of motion.  Pulses full and equal. No cyanosis, clubbing, or edema.  Neurological: Paraplegia.  No focal deficit.  Psychiatric: Appropriate mood and affect.                Patient History   Past Medical History:   Diagnosis Date    Anemia     Bowel perforation (Multi)     Buttock wound,  unspecified laterality, subsequent encounter     COPD (chronic obstructive pulmonary disease) (Multi)     Extended spectrum beta lactamase (ESBL) resistance     GERD (gastroesophageal reflux disease)     Hyperlipidemia     Hypertension     Hypo-osmolality and hyponatremia     Insomnia     Multiple sclerosis (Multi)     Neurogenic bladder     Neuromuscular dysfunction of bladder     Personal history of transient ischemic attack (TIA), and cerebral infarction without residual deficits 08/06/2020    History of embolic stroke    Pulmonary embolism     Sepsis with encephalopathy (Multi)     Septic shock (Multi)     Simple febrile convulsions (Multi)     Status post administration of tPA (rtPA) in a different facility within the last 24 hours prior to admission to current facility 08/06/2020    Tissue plasminogen activator (tPA) administered at other facility within 24 hours before current admission    UTI (urinary tract infection)      Past Surgical History:   Procedure Laterality Date    MR HEAD ANGIO WO IV CONTRAST  6/29/2020    MR HEAD ANGIO WO IV CONTRAST 6/29/2020 [facility]    MR NECK ANGIO WO IV CONTRAST  6/29/2020    MR NECK ANGIO WO IV CONTRAST 6/29/2020 [facility]     No family history on file.  Social History     Tobacco Use    Smoking status: Never    Smokeless tobacco: Never   Substance Use Topics    Alcohol use: Not on file    Drug use: Never       Physical Exam   ED Triage Vitals   Temp Pulse Resp BP   -- -- -- --      SpO2 Temp src Heart Rate Source Patient Position   -- -- -- --      BP Location FiO2 (%)     -- --       Physical Exam      ED Course & MDM   Diagnoses as of 04/05/25 1046   Abdominal pain, unspecified abdominal location   Proctitis                 No data recorded                                 Medical Decision Making  Labs Reviewed  CBC WITH AUTO DIFFERENTIAL - Abnormal     WBC                           8.0                    nRBC                          0.0                     RBC                           4.43 (*)               Hemoglobin                    13.6                   Hematocrit                    41.4                   MCV                           94                     MCH                           30.7                   MCHC                          32.9                   RDW                           13.6                   Platelets                     285                    Neutrophils %                 53.6                   Immature Granulocytes %, Automated   0.3                    Lymphocytes %                 15.1                   Monocytes %                   16.3                   Eosinophils %                 13.8                   Basophils %                   0.9                    Neutrophils Absolute          4.30                   Immature Granulocytes Absolute, Au*   0.02                   Lymphocytes Absolute          1.21                   Monocytes Absolute            1.30 (*)               Eosinophils Absolute          1.10 (*)               Basophils Absolute            0.07                COMPREHENSIVE METABOLIC PANEL - Abnormal     Glucose                       96                     Sodium                        131 (*)                Potassium                     3.7                    Chloride                      98                     Bicarbonate                   26                     Anion Gap                     11                     Urea Nitrogen                 8                      Creatinine                    0.47 (*)               eGFR                          >90                    Calcium                       8.6                    Albumin                       3.8                    Alkaline Phosphatase          107                    Total Protein                 6.3 (*)                AST                           17                     Bilirubin, Total              0.3                    ALT                            17                  URINALYSIS WITH REFLEX CULTURE AND MICROSCOPIC - Abnormal     Color, Urine                  Colorless (*)               Appearance, Urine             Clear                  Specific Gravity, Urine       1.005                  pH, Urine                     8.0                    Protein, Urine                NEGATIVE                Glucose, Urine                Normal                 Blood, Urine                  NEGATIVE                Ketones, Urine                NEGATIVE                Bilirubin, Urine              NEGATIVE                Urobilinogen, Urine           Normal                 Nitrite, Urine                NEGATIVE                Leukocyte Esterase, Urine     500 Silvestre/uL (*)            MICROSCOPIC ONLY, URINE - Abnormal     WBC, Urine                    21-50 (*)               WBC Clumps, Urine             RARE                   RBC, Urine                    3-5                 LACTATE - Normal     Lactate                       0.9                        Narrative: Venipuncture immediately after or during the administration of Metamizole may lead to falsely low results. Testing should be performed immediately prior to Metamizole dosing.  BLOOD CULTURE  BLOOD CULTURE  URINE CULTURE  URINALYSIS WITH REFLEX CULTURE AND MICROSCOPIC         Narrative: The following orders were created for panel order Urinalysis with Reflex Culture and Microscopic.                  Procedure                               Abnormality         Status                                     ---------                               -----------         ------                                     Urinalysis with Reflex C...[[id number]]  Abnormal            Final result                               Extra Urine Gray Tube[[id number]]                            In process                                                   Please view results for these tests on the individual orders.  EXTRA URINE GRAY TUBE  CT abdomen  pelvis w IV contrast   Final Result    Thickening of the walls of the urinary bladder is nonspecific and may    relate to incomplete distention, cystitis, chronic bladder outlet    obstruction, or neurogenic bladder. Thickening of the walls of the    rectum suggesting nonspecific infectious or inflammatory proctitis.    Stable 2.2 cm calculus in the right renal pelvis extending to the    ureteropelvic junction. Additional findings as detailed above.          MACRO:    None.          Signed by: Finesse Reynolds 4/5/2025 9:55 AM    Dictation workstation:   [id number]     XR chest 1 view   Final Result    No focal consolidation.          MACRO    None          Signed by: Zeus Johnson 4/5/2025 9:05 AM    Dictation workstation:   [id number]     Medical Decision Making:    Patient appears well nontoxic.  Afebrile.  No leukocytosis.  Urinalysis shows leukocytes as well as white blood cells without bacteria or nitrites.  This is from suprapubic catheter.  CT shows some mild likely inflammatory proctitis with chronic findings.  Pulse oximetry between 90 and 95%.  History of COPD.  No respiratory distress or shortness of breath.  Patient currently doing well and urine culture will be sent.  Advised on follow-up with primary care and asked to return for new or worsening symptoms.  Stable at time of discharge.    Differential Diagnoses Considered: UTI, diverticulitis, appendicitis, electrolyte abnormality, viral illness, volume depletion    Independent Interpretation of Studies:  I independently interpreted: Chest x-ray shows no evidence of pneumonia or pneumothorax.  CT of the abdomen pelvis shows no intra-abdominal free air.    Escalation of Care:  Appropriate for discharge and follow-up with primary care.          Procedure  Procedures     Po Crump DO  04/05/25 1047

## 2025-04-06 LAB
BACTERIA BLD CULT: NORMAL
BACTERIA BLD CULT: NORMAL
BACTERIA UR CULT: NORMAL

## 2025-04-09 LAB
BACTERIA BLD CULT: NORMAL
BACTERIA BLD CULT: NORMAL

## 2025-04-28 ENCOUNTER — HOSPITAL ENCOUNTER (EMERGENCY)
Facility: HOSPITAL | Age: 65
Discharge: NURSING, REHAB OR LONG-TERM CARE | End: 2025-04-29
Attending: EMERGENCY MEDICINE
Payer: MEDICARE

## 2025-04-28 DIAGNOSIS — T83.010A SUPRAPUBIC CATHETER DYSFUNCTION, INITIAL ENCOUNTER: Primary | ICD-10-CM

## 2025-04-28 DIAGNOSIS — R33.9 URINARY RETENTION: ICD-10-CM

## 2025-04-28 PROCEDURE — 99283 EMERGENCY DEPT VISIT LOW MDM: CPT | Mod: 25 | Performed by: EMERGENCY MEDICINE

## 2025-04-28 PROCEDURE — 51705 CHANGE OF BLADDER TUBE: CPT

## 2025-04-28 ASSESSMENT — COLUMBIA-SUICIDE SEVERITY RATING SCALE - C-SSRS
2. HAVE YOU ACTUALLY HAD ANY THOUGHTS OF KILLING YOURSELF?: NO
1. IN THE PAST MONTH, HAVE YOU WISHED YOU WERE DEAD OR WISHED YOU COULD GO TO SLEEP AND NOT WAKE UP?: NO
6. HAVE YOU EVER DONE ANYTHING, STARTED TO DO ANYTHING, OR PREPARED TO DO ANYTHING TO END YOUR LIFE?: NO

## 2025-04-28 ASSESSMENT — PAIN SCALES - GENERAL
PAINLEVEL_OUTOF10: 7
PAINLEVEL_OUTOF10: 7

## 2025-04-28 ASSESSMENT — PAIN DESCRIPTION - PAIN TYPE: TYPE: ACUTE PAIN

## 2025-04-28 ASSESSMENT — PAIN DESCRIPTION - LOCATION: LOCATION: ABDOMEN

## 2025-04-28 ASSESSMENT — PAIN - FUNCTIONAL ASSESSMENT: PAIN_FUNCTIONAL_ASSESSMENT: 0-10

## 2025-04-29 VITALS
SYSTOLIC BLOOD PRESSURE: 138 MMHG | DIASTOLIC BLOOD PRESSURE: 80 MMHG | HEIGHT: 73 IN | HEART RATE: 59 BPM | WEIGHT: 172 LBS | OXYGEN SATURATION: 94 % | BODY MASS INDEX: 22.8 KG/M2 | RESPIRATION RATE: 18 BRPM | TEMPERATURE: 97 F

## 2025-04-29 LAB
AMORPH CRY #/AREA UR COMP ASSIST: ABNORMAL /HPF
APPEARANCE UR: ABNORMAL
BACTERIA #/AREA URNS AUTO: ABNORMAL /HPF
BILIRUB UR STRIP.AUTO-MCNC: NEGATIVE MG/DL
COLOR UR: YELLOW
GLUCOSE UR STRIP.AUTO-MCNC: NORMAL MG/DL
KETONES UR STRIP.AUTO-MCNC: NEGATIVE MG/DL
LEUKOCYTE ESTERASE UR QL STRIP.AUTO: ABNORMAL
MUCOUS THREADS #/AREA URNS AUTO: ABNORMAL /LPF
NITRITE UR QL STRIP.AUTO: NEGATIVE
PH UR STRIP.AUTO: 6.5 [PH]
PROT UR STRIP.AUTO-MCNC: ABNORMAL MG/DL
RBC # UR STRIP.AUTO: ABNORMAL MG/DL
RBC #/AREA URNS AUTO: ABNORMAL /HPF
SP GR UR STRIP.AUTO: 1.01
UROBILINOGEN UR STRIP.AUTO-MCNC: NORMAL MG/DL
WBC #/AREA URNS AUTO: >50 /HPF
WBC CLUMPS #/AREA URNS AUTO: ABNORMAL /HPF

## 2025-04-29 PROCEDURE — 81001 URINALYSIS AUTO W/SCOPE: CPT | Performed by: EMERGENCY MEDICINE

## 2025-04-29 PROCEDURE — 87086 URINE CULTURE/COLONY COUNT: CPT | Mod: SAMLAB | Performed by: EMERGENCY MEDICINE

## 2025-04-29 ASSESSMENT — PAIN SCALES - GENERAL
PAINLEVEL_OUTOF10: 6
PAINLEVEL_OUTOF10: 4
PAINLEVEL_OUTOF10: 2
PAINLEVEL_OUTOF10: 5 - MODERATE PAIN
PAINLEVEL_OUTOF10: 4
PAINLEVEL_OUTOF10: 0 - NO PAIN

## 2025-04-29 ASSESSMENT — PAIN DESCRIPTION - PAIN TYPE: TYPE: ACUTE PAIN

## 2025-04-29 ASSESSMENT — PAIN - FUNCTIONAL ASSESSMENT: PAIN_FUNCTIONAL_ASSESSMENT: 0-10

## 2025-04-29 NOTE — ED PROVIDER NOTES
65-year-old male history of MS and has a suprapubic catheter presents via EMS with a chief complaint of urinary retention.  States only approximately 200cc of urinary output today and he usually produces between 1200 and 1500cc urine.  States his suprapubic catheter usually gets changed out once a month and he thinks this 1 has been in for approximately 2 months.         Review of Systems     Physical Exam  Vitals and nursing note reviewed.   Constitutional:       General: He is not in acute distress.     Appearance: He is well-developed.   HENT:      Head: Normocephalic and atraumatic.   Eyes:      Conjunctiva/sclera: Conjunctivae normal.   Cardiovascular:      Rate and Rhythm: Normal rate and regular rhythm.      Heart sounds: No murmur heard.  Pulmonary:      Effort: Pulmonary effort is normal. No respiratory distress.      Breath sounds: Normal breath sounds.   Abdominal:      General: There is distension.      Tenderness: There is abdominal tenderness in the suprapubic area.   Musculoskeletal:         General: No swelling.      Cervical back: Neck supple.      Comments: Patient has MS affecting extremity function.   Skin:     General: Skin is warm and dry.      Capillary Refill: Capillary refill takes less than 2 seconds.   Neurological:      Mental Status: He is alert.   Psychiatric:         Mood and Affect: Mood normal.          Labs Reviewed   URINALYSIS WITH REFLEX CULTURE AND MICROSCOPIC - Abnormal       Result Value    Color, Urine Yellow      Appearance, Urine Turbid (*)     Specific Gravity, Urine 1.012      pH, Urine 6.5      Protein, Urine 10 (TRACE)      Glucose, Urine Normal      Blood, Urine 0.03 (TRACE) (*)     Ketones, Urine NEGATIVE      Bilirubin, Urine NEGATIVE      Urobilinogen, Urine Normal      Nitrite, Urine NEGATIVE      Leukocyte Esterase, Urine 500 Silvestre/uL (*)    MICROSCOPIC ONLY, URINE - Abnormal    WBC, Urine >50 (*)     WBC Clumps, Urine FEW      RBC, Urine 6-10 (*)     Bacteria,  Urine 1+ (*)     Mucus, Urine FEW      Amorphous Crystals, Urine 1+     URINE CULTURE   URINALYSIS WITH REFLEX CULTURE AND MICROSCOPIC    Narrative:     The following orders were created for panel order Urinalysis with Reflex Culture and Microscopic.  Procedure                               Abnormality         Status                     ---------                               -----------         ------                     Urinalysis with Reflex C...[[id number]]  Abnormal            Final result               Extra Urine Gray Tube[[id number]]                                                         Please view results for these tests on the individual orders.   EXTRA URINE GRAY TUBE        No orders to display        Procedures     Medical Decision Making  65-year-old male history of MS and has a suprapubic catheter presents via EMS with a chief complaint of urinary retention.  States only approximately 200cc of urinary output today and he usually produces between 1200 and 1500cc urine.  States his suprapubic catheter usually gets changed out once a month and he thinks this 1 has been in for approximately 2 months.  The suprapubic catheter was removed after balloon deflation.  It was replaced in a sterile fashion with a 16 Slovak suprapubic catheter and the 10 cc balloon was inflated with sterile water.  No complications.  Urinalysis will be sent for culture as most of his urine has been culture negative lately.  Patient can be returned back to his facility.    DDx: Urinary retention, catheter malfunction, urinary tract infection         Diagnoses as of 04/29/25 0103   Suprapubic catheter dysfunction, initial encounter   Urinary retention                    Alexandru Hunter MD  04/29/25 0103

## 2025-04-30 LAB — BACTERIA UR CULT: NORMAL

## 2025-05-16 ENCOUNTER — TELEPHONE (OUTPATIENT)
Age: 65
End: 2025-05-16

## 2025-05-17 NOTE — TELEPHONE ENCOUNTER
KIRK from [facility] at [facility] requesting refill of his regular bid Norco. She said he has been out of it.  Reviewed chart, spoke with pharmacist at [facility].  Per pharmacist norco bid was discontinued 3/2, and 5/4 a 7 day supply for q 8 hours was sent in by another provider.  Pharmacist had no record of script from PCP or our office.    Advised [facility] I was not renewing based on the above, but would send you the information for further review.

## 2025-06-08 ENCOUNTER — APPOINTMENT (OUTPATIENT)
Dept: RADIOLOGY | Facility: HOSPITAL | Age: 65
DRG: 698 | End: 2025-06-08
Payer: MEDICARE

## 2025-06-08 ENCOUNTER — LAB REQUISITION (OUTPATIENT)
Dept: LAB | Facility: HOSPITAL | Age: 65
DRG: 698 | End: 2025-06-08
Payer: MEDICARE

## 2025-06-08 ENCOUNTER — HOSPITAL ENCOUNTER (INPATIENT)
Facility: HOSPITAL | Age: 65
LOS: 4 days | Discharge: NURSING, REHAB OR LONG-TERM CARE | DRG: 698 | End: 2025-06-12
Attending: STUDENT IN AN ORGANIZED HEALTH CARE EDUCATION/TRAINING PROGRAM | Admitting: STUDENT IN AN ORGANIZED HEALTH CARE EDUCATION/TRAINING PROGRAM
Payer: MEDICARE

## 2025-06-08 ENCOUNTER — APPOINTMENT (OUTPATIENT)
Dept: CARDIOLOGY | Facility: HOSPITAL | Age: 65
DRG: 698 | End: 2025-06-08
Payer: MEDICARE

## 2025-06-08 DIAGNOSIS — Z13.0 ENCOUNTER FOR SCREENING FOR DISEASES OF THE BLOOD AND BLOOD-FORMING ORGANS AND CERTAIN DISORDERS INVOLVING THE IMMUNE MECHANISM: ICD-10-CM

## 2025-06-08 DIAGNOSIS — R53.1 GENERALIZED WEAKNESS: Primary | ICD-10-CM

## 2025-06-08 DIAGNOSIS — T83.511A URINARY TRACT INFECTION ASSOCIATED WITH INDWELLING URETHRAL CATHETER, INITIAL ENCOUNTER: ICD-10-CM

## 2025-06-08 DIAGNOSIS — I95.89 OTHER SPECIFIED HYPOTENSION: ICD-10-CM

## 2025-06-08 DIAGNOSIS — D72.829 LEUKOCYTOSIS, UNSPECIFIED TYPE: ICD-10-CM

## 2025-06-08 DIAGNOSIS — N39.0 URINARY TRACT INFECTION ASSOCIATED WITH INDWELLING URETHRAL CATHETER, INITIAL ENCOUNTER: ICD-10-CM

## 2025-06-08 DIAGNOSIS — A41.9 SEPSIS SECONDARY TO UTI (MULTI): ICD-10-CM

## 2025-06-08 DIAGNOSIS — N39.0 SEPSIS SECONDARY TO UTI (MULTI): ICD-10-CM

## 2025-06-08 LAB
ALBUMIN SERPL BCP-MCNC: 3.8 G/DL (ref 3.4–5)
ALP SERPL-CCNC: 115 U/L (ref 33–136)
ALT SERPL W P-5'-P-CCNC: 18 U/L (ref 10–52)
AMORPH CRY #/AREA UR COMP ASSIST: ABNORMAL /HPF
ANION GAP SERPL CALC-SCNC: 12 MMOL/L (ref 10–20)
ANION GAP SERPL CALC-SCNC: 17 MMOL/L (ref 10–20)
APPEARANCE UR: ABNORMAL
APPEARANCE UR: ABNORMAL
AST SERPL W P-5'-P-CCNC: 19 U/L (ref 9–39)
BACTERIA #/AREA URNS AUTO: ABNORMAL /HPF
BACTERIA #/AREA URNS AUTO: ABNORMAL /HPF
BASOPHILS # BLD AUTO: 0.08 X10*3/UL (ref 0–0.1)
BASOPHILS # BLD MANUAL: 0 X10*3/UL (ref 0–0.1)
BASOPHILS NFR BLD AUTO: 0.4 %
BASOPHILS NFR BLD MANUAL: 0 %
BILIRUB SERPL-MCNC: 0.7 MG/DL (ref 0–1.2)
BILIRUB UR STRIP.AUTO-MCNC: NEGATIVE MG/DL
BILIRUB UR STRIP.AUTO-MCNC: NEGATIVE MG/DL
BUN SERPL-MCNC: 13 MG/DL (ref 6–23)
BUN SERPL-MCNC: 14 MG/DL (ref 6–23)
CALCIUM SERPL-MCNC: 8.9 MG/DL (ref 8.6–10.3)
CALCIUM SERPL-MCNC: 9.2 MG/DL (ref 8.6–10.3)
CARDIAC TROPONIN I PNL SERPL HS: 8 NG/L (ref 0–20)
CHLORIDE SERPL-SCNC: 102 MMOL/L (ref 98–107)
CHLORIDE SERPL-SCNC: 99 MMOL/L (ref 98–107)
CO2 SERPL-SCNC: 21 MMOL/L (ref 21–32)
CO2 SERPL-SCNC: 23 MMOL/L (ref 21–32)
COLOR UR: ABNORMAL
COLOR UR: ABNORMAL
CREAT SERPL-MCNC: 0.51 MG/DL (ref 0.5–1.3)
CREAT SERPL-MCNC: 0.66 MG/DL (ref 0.5–1.3)
EGFRCR SERPLBLD CKD-EPI 2021: >90 ML/MIN/1.73M*2
EGFRCR SERPLBLD CKD-EPI 2021: >90 ML/MIN/1.73M*2
EOSINOPHIL # BLD AUTO: 0.12 X10*3/UL (ref 0–0.7)
EOSINOPHIL # BLD MANUAL: 0 X10*3/UL (ref 0–0.7)
EOSINOPHIL NFR BLD AUTO: 0.6 %
EOSINOPHIL NFR BLD MANUAL: 0 %
ERYTHROCYTE [DISTWIDTH] IN BLOOD BY AUTOMATED COUNT: 13.2 % (ref 11.5–14.5)
ERYTHROCYTE [DISTWIDTH] IN BLOOD BY AUTOMATED COUNT: 13.3 % (ref 11.5–14.5)
GLUCOSE SERPL-MCNC: 77 MG/DL (ref 74–99)
GLUCOSE SERPL-MCNC: 95 MG/DL (ref 74–99)
GLUCOSE UR STRIP.AUTO-MCNC: NORMAL MG/DL
GLUCOSE UR STRIP.AUTO-MCNC: NORMAL MG/DL
HCT VFR BLD AUTO: 44.4 % (ref 41–52)
HCT VFR BLD AUTO: 49.2 % (ref 41–52)
HGB BLD-MCNC: 14.9 G/DL (ref 13.5–17.5)
HGB BLD-MCNC: 16.1 G/DL (ref 13.5–17.5)
IMM GRANULOCYTES # BLD AUTO: 0.08 X10*3/UL (ref 0–0.7)
IMM GRANULOCYTES # BLD AUTO: 0.08 X10*3/UL (ref 0–0.7)
IMM GRANULOCYTES NFR BLD AUTO: 0.4 % (ref 0–0.9)
IMM GRANULOCYTES NFR BLD AUTO: 0.4 % (ref 0–0.9)
KETONES UR STRIP.AUTO-MCNC: NEGATIVE MG/DL
KETONES UR STRIP.AUTO-MCNC: NEGATIVE MG/DL
LACTATE SERPL-SCNC: 1 MMOL/L (ref 0.4–2)
LEUKOCYTE ESTERASE UR QL STRIP.AUTO: ABNORMAL
LEUKOCYTE ESTERASE UR QL STRIP.AUTO: ABNORMAL
LYMPHOCYTES # BLD AUTO: 0.52 X10*3/UL (ref 1.2–4.8)
LYMPHOCYTES # BLD MANUAL: 1.47 X10*3/UL (ref 1.2–4.8)
LYMPHOCYTES NFR BLD AUTO: 2.7 %
LYMPHOCYTES NFR BLD MANUAL: 8 %
MCH RBC QN AUTO: 30.7 PG (ref 26–34)
MCH RBC QN AUTO: 30.9 PG (ref 26–34)
MCHC RBC AUTO-ENTMCNC: 32.7 G/DL (ref 32–36)
MCHC RBC AUTO-ENTMCNC: 33.6 G/DL (ref 32–36)
MCV RBC AUTO: 92 FL (ref 80–100)
MCV RBC AUTO: 94 FL (ref 80–100)
MONOCYTES # BLD AUTO: 2.04 X10*3/UL (ref 0.1–1)
MONOCYTES # BLD MANUAL: 0.92 X10*3/UL (ref 0.1–1)
MONOCYTES NFR BLD AUTO: 10.7 %
MONOCYTES NFR BLD MANUAL: 5 %
MUCOUS THREADS #/AREA URNS AUTO: ABNORMAL /LPF
MUCOUS THREADS #/AREA URNS AUTO: ABNORMAL /LPF
NEUTROPHILS # BLD AUTO: 16.28 X10*3/UL (ref 1.2–7.7)
NEUTROPHILS NFR BLD AUTO: 85.2 %
NEUTS SEG # BLD MANUAL: 16.01 X10*3/UL (ref 1.2–7)
NEUTS SEG NFR BLD MANUAL: 87 %
NITRITE UR QL STRIP.AUTO: NEGATIVE
NITRITE UR QL STRIP.AUTO: NEGATIVE
NRBC BLD-RTO: 0 /100 WBCS (ref 0–0)
NRBC BLD-RTO: 0 /100 WBCS (ref 0–0)
PH UR STRIP.AUTO: 7 [PH]
PH UR STRIP.AUTO: 7.5 [PH]
PLATELET # BLD AUTO: 328 X10*3/UL (ref 150–450)
PLATELET # BLD AUTO: 360 X10*3/UL (ref 150–450)
POTASSIUM SERPL-SCNC: 4.2 MMOL/L (ref 3.5–5.3)
POTASSIUM SERPL-SCNC: 4.5 MMOL/L (ref 3.5–5.3)
PROT SERPL-MCNC: 6.6 G/DL (ref 6.4–8.2)
PROT UR STRIP.AUTO-MCNC: ABNORMAL MG/DL
PROT UR STRIP.AUTO-MCNC: ABNORMAL MG/DL
RBC # BLD AUTO: 4.82 X10*6/UL (ref 4.5–5.9)
RBC # BLD AUTO: 5.25 X10*6/UL (ref 4.5–5.9)
RBC # UR STRIP.AUTO: ABNORMAL MG/DL
RBC # UR STRIP.AUTO: ABNORMAL MG/DL
RBC #/AREA URNS AUTO: >20 /HPF
RBC #/AREA URNS AUTO: >20 /HPF
RBC MORPH BLD: ABNORMAL
SODIUM SERPL-SCNC: 132 MMOL/L (ref 136–145)
SODIUM SERPL-SCNC: 133 MMOL/L (ref 136–145)
SP GR UR STRIP.AUTO: 1
SP GR UR STRIP.AUTO: 1.02
TOTAL CELLS COUNTED BLD: 100
UROBILINOGEN UR STRIP.AUTO-MCNC: NORMAL MG/DL
UROBILINOGEN UR STRIP.AUTO-MCNC: NORMAL MG/DL
WBC # BLD AUTO: 18.4 X10*3/UL (ref 4.4–11.3)
WBC # BLD AUTO: 19.1 X10*3/UL (ref 4.4–11.3)
WBC #/AREA URNS AUTO: >50 /HPF
WBC #/AREA URNS AUTO: ABNORMAL /HPF
WBC CLUMPS #/AREA URNS AUTO: ABNORMAL /HPF

## 2025-06-08 PROCEDURE — 80048 BASIC METABOLIC PNL TOTAL CA: CPT | Mod: OUT | Performed by: FAMILY MEDICINE

## 2025-06-08 PROCEDURE — 87086 URINE CULTURE/COLONY COUNT: CPT | Mod: OUT,SAMLAB | Performed by: FAMILY MEDICINE

## 2025-06-08 PROCEDURE — 2500000004 HC RX 250 GENERAL PHARMACY W/ HCPCS (ALT 636 FOR OP/ED): Performed by: STUDENT IN AN ORGANIZED HEALTH CARE EDUCATION/TRAINING PROGRAM

## 2025-06-08 PROCEDURE — 71045 X-RAY EXAM CHEST 1 VIEW: CPT | Performed by: RADIOLOGY

## 2025-06-08 PROCEDURE — 87086 URINE CULTURE/COLONY COUNT: CPT | Mod: SAMLAB | Performed by: PHYSICIAN ASSISTANT

## 2025-06-08 PROCEDURE — 2020000001 HC ICU ROOM DAILY

## 2025-06-08 PROCEDURE — 81001 URINALYSIS AUTO W/SCOPE: CPT | Performed by: PHYSICIAN ASSISTANT

## 2025-06-08 PROCEDURE — 2500000004 HC RX 250 GENERAL PHARMACY W/ HCPCS (ALT 636 FOR OP/ED): Performed by: PHYSICIAN ASSISTANT

## 2025-06-08 PROCEDURE — 85007 BL SMEAR W/DIFF WBC COUNT: CPT | Performed by: PHYSICIAN ASSISTANT

## 2025-06-08 PROCEDURE — 84484 ASSAY OF TROPONIN QUANT: CPT | Performed by: PHYSICIAN ASSISTANT

## 2025-06-08 PROCEDURE — 96365 THER/PROPH/DIAG IV INF INIT: CPT

## 2025-06-08 PROCEDURE — 80048 BASIC METABOLIC PNL TOTAL CA: CPT | Performed by: PHYSICIAN ASSISTANT

## 2025-06-08 PROCEDURE — 99291 CRITICAL CARE FIRST HOUR: CPT | Mod: 25 | Performed by: PHYSICIAN ASSISTANT

## 2025-06-08 PROCEDURE — 2500000001 HC RX 250 WO HCPCS SELF ADMINISTERED DRUGS (ALT 637 FOR MEDICARE OP): Performed by: PHYSICIAN ASSISTANT

## 2025-06-08 PROCEDURE — 83605 ASSAY OF LACTIC ACID: CPT | Performed by: PHYSICIAN ASSISTANT

## 2025-06-08 PROCEDURE — 71045 X-RAY EXAM CHEST 1 VIEW: CPT

## 2025-06-08 PROCEDURE — 96366 THER/PROPH/DIAG IV INF ADDON: CPT

## 2025-06-08 PROCEDURE — 2500000005 HC RX 250 GENERAL PHARMACY W/O HCPCS: Performed by: PHYSICIAN ASSISTANT

## 2025-06-08 PROCEDURE — 87077 CULTURE AEROBIC IDENTIFY: CPT | Mod: SAMLAB | Performed by: PHYSICIAN ASSISTANT

## 2025-06-08 PROCEDURE — 36415 COLL VENOUS BLD VENIPUNCTURE: CPT | Performed by: PHYSICIAN ASSISTANT

## 2025-06-08 PROCEDURE — 96367 TX/PROPH/DG ADDL SEQ IV INF: CPT

## 2025-06-08 PROCEDURE — 93005 ELECTROCARDIOGRAM TRACING: CPT

## 2025-06-08 PROCEDURE — 99285 EMERGENCY DEPT VISIT HI MDM: CPT

## 2025-06-08 PROCEDURE — 81001 URINALYSIS AUTO W/SCOPE: CPT | Mod: OUT | Performed by: FAMILY MEDICINE

## 2025-06-08 PROCEDURE — 85027 COMPLETE CBC AUTOMATED: CPT | Performed by: PHYSICIAN ASSISTANT

## 2025-06-08 PROCEDURE — 85025 COMPLETE CBC W/AUTO DIFF WBC: CPT | Mod: OUT | Performed by: FAMILY MEDICINE

## 2025-06-08 PROCEDURE — 99291 CRITICAL CARE FIRST HOUR: CPT | Performed by: STUDENT IN AN ORGANIZED HEALTH CARE EDUCATION/TRAINING PROGRAM

## 2025-06-08 RX ORDER — BACLOFEN 10 MG/1
10 TABLET ORAL 3 TIMES DAILY
Status: DISCONTINUED | OUTPATIENT
Start: 2025-06-08 | End: 2025-06-12 | Stop reason: HOSPADM

## 2025-06-08 RX ORDER — LOPERAMIDE HYDROCHLORIDE 2 MG/1
2 CAPSULE ORAL AS NEEDED
Status: DISCONTINUED | OUTPATIENT
Start: 2025-06-08 | End: 2025-06-12 | Stop reason: HOSPADM

## 2025-06-08 RX ORDER — POLYETHYLENE GLYCOL 3350 17 G/17G
17 POWDER, FOR SOLUTION ORAL DAILY
Status: DISCONTINUED | OUTPATIENT
Start: 2025-06-09 | End: 2025-06-12 | Stop reason: HOSPADM

## 2025-06-08 RX ORDER — BISACODYL 10 MG/1
10 SUPPOSITORY RECTAL DAILY PRN
Status: DISCONTINUED | OUTPATIENT
Start: 2025-06-08 | End: 2025-06-12 | Stop reason: HOSPADM

## 2025-06-08 RX ORDER — ONDANSETRON HYDROCHLORIDE 2 MG/ML
4 INJECTION, SOLUTION INTRAVENOUS EVERY 4 HOURS PRN
Status: DISCONTINUED | OUTPATIENT
Start: 2025-06-08 | End: 2025-06-12 | Stop reason: HOSPADM

## 2025-06-08 RX ORDER — METOPROLOL SUCCINATE 25 MG/1
25 TABLET, EXTENDED RELEASE ORAL DAILY
Status: DISCONTINUED | OUTPATIENT
Start: 2025-06-09 | End: 2025-06-12 | Stop reason: HOSPADM

## 2025-06-08 RX ORDER — ACETAMINOPHEN 325 MG/1
650 TABLET ORAL EVERY 6 HOURS PRN
Status: DISCONTINUED | OUTPATIENT
Start: 2025-06-08 | End: 2025-06-12 | Stop reason: HOSPADM

## 2025-06-08 RX ORDER — HYDROXYZINE PAMOATE 25 MG/1
25 CAPSULE ORAL EVERY 4 HOURS PRN
Status: DISCONTINUED | OUTPATIENT
Start: 2025-06-08 | End: 2025-06-12 | Stop reason: HOSPADM

## 2025-06-08 RX ORDER — METOPROLOL TARTRATE 1 MG/ML
5 INJECTION, SOLUTION INTRAVENOUS EVERY 6 HOURS PRN
Status: DISCONTINUED | OUTPATIENT
Start: 2025-06-08 | End: 2025-06-12 | Stop reason: HOSPADM

## 2025-06-08 RX ORDER — ALUMINUM HYDROXIDE, MAGNESIUM HYDROXIDE, AND SIMETHICONE 1200; 120; 1200 MG/30ML; MG/30ML; MG/30ML
20 SUSPENSION ORAL 4 TIMES DAILY PRN
Status: DISCONTINUED | OUTPATIENT
Start: 2025-06-08 | End: 2025-06-12 | Stop reason: HOSPADM

## 2025-06-08 RX ORDER — BENZONATATE 100 MG/1
100 CAPSULE ORAL 3 TIMES DAILY PRN
Status: DISCONTINUED | OUTPATIENT
Start: 2025-06-08 | End: 2025-06-12 | Stop reason: HOSPADM

## 2025-06-08 RX ORDER — TALC
6 POWDER (GRAM) TOPICAL NIGHTLY PRN
Status: DISCONTINUED | OUTPATIENT
Start: 2025-06-08 | End: 2025-06-12 | Stop reason: HOSPADM

## 2025-06-08 RX ORDER — POLYETHYLENE GLYCOL 3350 17 G/17G
17 POWDER, FOR SOLUTION ORAL DAILY PRN
Status: DISCONTINUED | OUTPATIENT
Start: 2025-06-08 | End: 2025-06-12 | Stop reason: HOSPADM

## 2025-06-08 RX ORDER — ACETAMINOPHEN 325 MG/1
650 TABLET ORAL ONCE
Status: COMPLETED | OUTPATIENT
Start: 2025-06-08 | End: 2025-06-08

## 2025-06-08 RX ORDER — ATORVASTATIN CALCIUM 80 MG/1
80 TABLET, FILM COATED ORAL NIGHTLY
Status: DISCONTINUED | OUTPATIENT
Start: 2025-06-08 | End: 2025-06-12 | Stop reason: HOSPADM

## 2025-06-08 RX ORDER — HYDROCODONE BITARTRATE AND ACETAMINOPHEN 5; 325 MG/1; MG/1
1 TABLET ORAL 2 TIMES DAILY
Status: DISCONTINUED | OUTPATIENT
Start: 2025-06-08 | End: 2025-06-12 | Stop reason: HOSPADM

## 2025-06-08 RX ORDER — CALCIUM CARBONATE 200(500)MG
2 TABLET,CHEWABLE ORAL 4 TIMES DAILY PRN
Status: DISCONTINUED | OUTPATIENT
Start: 2025-06-08 | End: 2025-06-12 | Stop reason: HOSPADM

## 2025-06-08 RX ORDER — METHIMAZOLE 5 MG/1
5 TABLET ORAL 3 TIMES DAILY
Status: DISCONTINUED | OUTPATIENT
Start: 2025-06-08 | End: 2025-06-12 | Stop reason: HOSPADM

## 2025-06-08 RX ORDER — VANCOMYCIN 2 GRAM/500 ML IN 0.9 % SODIUM CHLORIDE INTRAVENOUS
2 ONCE
Status: COMPLETED | OUTPATIENT
Start: 2025-06-08 | End: 2025-06-08

## 2025-06-08 RX ORDER — SODIUM CHLORIDE 9 MG/ML
125 INJECTION, SOLUTION INTRAVENOUS CONTINUOUS
Status: DISCONTINUED | OUTPATIENT
Start: 2025-06-08 | End: 2025-06-09

## 2025-06-08 RX ORDER — CARBAMAZEPINE 200 MG/1
200 TABLET ORAL 2 TIMES DAILY
Status: DISCONTINUED | OUTPATIENT
Start: 2025-06-08 | End: 2025-06-12 | Stop reason: HOSPADM

## 2025-06-08 RX ORDER — GUAIFENESIN 600 MG/1
600 TABLET, EXTENDED RELEASE ORAL 2 TIMES DAILY PRN
Status: DISCONTINUED | OUTPATIENT
Start: 2025-06-08 | End: 2025-06-12 | Stop reason: HOSPADM

## 2025-06-08 RX ORDER — GABAPENTIN 300 MG/1
600 CAPSULE ORAL 3 TIMES DAILY
Status: DISCONTINUED | OUTPATIENT
Start: 2025-06-08 | End: 2025-06-12 | Stop reason: HOSPADM

## 2025-06-08 RX ORDER — VANCOMYCIN HYDROCHLORIDE 1 G/20ML
INJECTION, POWDER, LYOPHILIZED, FOR SOLUTION INTRAVENOUS DAILY PRN
Status: DISCONTINUED | OUTPATIENT
Start: 2025-06-08 | End: 2025-06-09 | Stop reason: ALTCHOICE

## 2025-06-08 RX ORDER — HYDRALAZINE HYDROCHLORIDE 20 MG/ML
10 INJECTION INTRAMUSCULAR; INTRAVENOUS EVERY 4 HOURS PRN
Status: DISCONTINUED | OUTPATIENT
Start: 2025-06-08 | End: 2025-06-12 | Stop reason: HOSPADM

## 2025-06-08 RX ORDER — PROCHLORPERAZINE EDISYLATE 5 MG/ML
10 INJECTION INTRAMUSCULAR; INTRAVENOUS EVERY 6 HOURS PRN
Status: DISCONTINUED | OUTPATIENT
Start: 2025-06-08 | End: 2025-06-12 | Stop reason: HOSPADM

## 2025-06-08 RX ADMIN — SODIUM CHLORIDE 125 ML/HR: 0.9 INJECTION, SOLUTION INTRAVENOUS at 23:18

## 2025-06-08 RX ADMIN — PIPERACILLIN SODIUM AND TAZOBACTAM SODIUM 4.5 G: 4; .5 INJECTION, SOLUTION INTRAVENOUS at 18:18

## 2025-06-08 RX ADMIN — Medication 2 G: at 18:54

## 2025-06-08 RX ADMIN — SODIUM CHLORIDE 2313 ML: 0.9 INJECTION, SOLUTION INTRAVENOUS at 18:09

## 2025-06-08 RX ADMIN — Medication 2 L/MIN: at 17:42

## 2025-06-08 RX ADMIN — ACETAMINOPHEN 650 MG: 325 TABLET ORAL at 18:02

## 2025-06-08 SDOH — SOCIAL STABILITY: SOCIAL INSECURITY: DO YOU FEEL ANYONE HAS EXPLOITED OR TAKEN ADVANTAGE OF YOU FINANCIALLY OR OF YOUR PERSONAL PROPERTY?: NO

## 2025-06-08 SDOH — SOCIAL STABILITY: SOCIAL INSECURITY: HAS ANYONE EVER THREATENED TO HURT YOUR FAMILY OR YOUR PETS?: NO

## 2025-06-08 SDOH — SOCIAL STABILITY: SOCIAL INSECURITY: ARE YOU OR HAVE YOU BEEN THREATENED OR ABUSED PHYSICALLY, EMOTIONALLY, OR SEXUALLY BY ANYONE?: NO

## 2025-06-08 SDOH — SOCIAL STABILITY: SOCIAL INSECURITY: WERE YOU ABLE TO COMPLETE ALL THE BEHAVIORAL HEALTH SCREENINGS?: YES

## 2025-06-08 SDOH — SOCIAL STABILITY: SOCIAL INSECURITY: DOES ANYONE TRY TO KEEP YOU FROM HAVING/CONTACTING OTHER FRIENDS OR DOING THINGS OUTSIDE YOUR HOME?: NO

## 2025-06-08 SDOH — SOCIAL STABILITY: SOCIAL INSECURITY: ARE THERE ANY APPARENT SIGNS OF INJURIES/BEHAVIORS THAT COULD BE RELATED TO ABUSE/NEGLECT?: NO

## 2025-06-08 SDOH — SOCIAL STABILITY: SOCIAL INSECURITY: ABUSE: ADULT

## 2025-06-08 SDOH — SOCIAL STABILITY: SOCIAL INSECURITY: HAVE YOU HAD THOUGHTS OF HARMING ANYONE ELSE?: NO

## 2025-06-08 SDOH — SOCIAL STABILITY: SOCIAL INSECURITY: DO YOU FEEL UNSAFE GOING BACK TO THE PLACE WHERE YOU ARE LIVING?: NO

## 2025-06-08 ASSESSMENT — ACTIVITIES OF DAILY LIVING (ADL)
TOILETING: DEPENDENT
WALKS IN HOME: DEPENDENT
ADEQUATE_TO_COMPLETE_ADL: YES
HEARING - RIGHT EAR: FUNCTIONAL
JUDGMENT_ADEQUATE_SAFELY_COMPLETE_DAILY_ACTIVITIES: YES
ASSISTIVE_DEVICE: EYEGLASSES
DRESSING YOURSELF: DEPENDENT
PATIENT'S MEMORY ADEQUATE TO SAFELY COMPLETE DAILY ACTIVITIES?: YES
HEARING - LEFT EAR: FUNCTIONAL
BATHING: DEPENDENT
GROOMING: DEPENDENT
FEEDING YOURSELF: INDEPENDENT

## 2025-06-08 ASSESSMENT — LIFESTYLE VARIABLES
SKIP TO QUESTIONS 9-10: 1
HOW MANY STANDARD DRINKS CONTAINING ALCOHOL DO YOU HAVE ON A TYPICAL DAY: 1 OR 2
HOW OFTEN DO YOU HAVE A DRINK CONTAINING ALCOHOL: 2-3 TIMES A WEEK
HOW OFTEN DO YOU HAVE 6 OR MORE DRINKS ON ONE OCCASION: NEVER
AUDIT-C TOTAL SCORE: 3
AUDIT-C TOTAL SCORE: 3

## 2025-06-08 ASSESSMENT — ENCOUNTER SYMPTOMS
SHORTNESS OF BREATH: 0
PALPITATIONS: 0
COUGH: 0
CHILLS: 0
EYE PAIN: 0
WHEEZING: 0
VOMITING: 0
SORE THROAT: 0
FEVER: 0
ABDOMINAL PAIN: 0
COLOR CHANGE: 0
SEIZURES: 0
ARTHRALGIAS: 0
BACK PAIN: 0
DYSURIA: 0
HEMATURIA: 0

## 2025-06-08 ASSESSMENT — PAIN SCALES - GENERAL
PAINLEVEL_OUTOF10: 0 - NO PAIN

## 2025-06-08 ASSESSMENT — COGNITIVE AND FUNCTIONAL STATUS - GENERAL: PATIENT BASELINE BEDBOUND: YES

## 2025-06-08 ASSESSMENT — PAIN - FUNCTIONAL ASSESSMENT
PAIN_FUNCTIONAL_ASSESSMENT: 0-10

## 2025-06-08 NOTE — ED PROVIDER NOTES
Patient is a 65-year-old male with a history of multiple sclerosis who resides at [facility] who presents via squad for concern for sepsis.  Nursing home reports that patient has a suprapubic catheter and his catheter was noted to be clogged this morning. The catheter was changed this morning and per nursing staff 1200 mL of urine obtained.  Patient states that he feels generally weak. No focal deficits. He was noted to be hypotensive. He denies any specific complaints upon my assessment.           Review of Systems   Constitutional:  Negative for chills and fever.   HENT:  Negative for ear pain and sore throat.    Eyes:  Negative for pain and visual disturbance.   Respiratory:  Negative for cough, shortness of breath and wheezing.    Cardiovascular:  Negative for chest pain and palpitations.   Gastrointestinal:  Negative for abdominal pain and vomiting.   Genitourinary:  Negative for dysuria and hematuria.   Musculoskeletal:  Negative for arthralgias and back pain.   Skin:  Negative for color change and rash.   Neurological:  Negative for seizures and syncope.   All other systems reviewed and are negative.       Physical Exam  Vitals and nursing note reviewed.   Constitutional:       General: He is not in acute distress.     Appearance: He is well-developed. He is ill-appearing.   HENT:      Head: Normocephalic and atraumatic.      Mouth/Throat:      Mouth: Mucous membranes are dry.   Eyes:      Extraocular Movements: Extraocular movements intact.      Conjunctiva/sclera: Conjunctivae normal.   Cardiovascular:      Rate and Rhythm: Normal rate and regular rhythm.      Heart sounds: No murmur heard.  Pulmonary:      Effort: Pulmonary effort is normal. No respiratory distress.      Breath sounds: Normal breath sounds. No stridor. No wheezing, rhonchi or rales.   Abdominal:      General: There is no distension.      Palpations: Abdomen is soft. There is no mass.      Tenderness: There is no abdominal  tenderness. There is no guarding or rebound.      Hernia: No hernia is present.   Musculoskeletal:         General: No swelling. Normal range of motion.      Cervical back: Normal range of motion and neck supple. No rigidity.   Skin:     General: Skin is warm and dry.      Capillary Refill: Capillary refill takes less than 2 seconds.   Neurological:      General: No focal deficit present.      Mental Status: He is alert and oriented to person, place, and time.      Cranial Nerves: No cranial nerve deficit.      Sensory: No sensory deficit.      Motor: No weakness.   Psychiatric:         Mood and Affect: Mood normal.          Labs Reviewed   CBC WITH AUTO DIFFERENTIAL - Abnormal       Result Value    WBC 18.4 (*)     nRBC 0.0      RBC 4.82      Hemoglobin 14.9      Hematocrit 44.4      MCV 92      MCH 30.9      MCHC 33.6      RDW 13.2      Platelets 328      Immature Granulocytes %, Automated 0.4      Immature Granulocytes Absolute, Automated 0.08     COMPREHENSIVE METABOLIC PANEL - Abnormal    Glucose 95      Sodium 133 (*)     Potassium 4.2      Chloride 102      Bicarbonate 23      Anion Gap 12      Urea Nitrogen 14      Creatinine 0.66      eGFR >90      Calcium 8.9      Albumin 3.8      Alkaline Phosphatase 115      Total Protein 6.6      AST 19      Bilirubin, Total 0.7      ALT 18     URINALYSIS WITH REFLEX CULTURE AND MICROSCOPIC - Abnormal    Color, Urine Orange (*)     Appearance, Urine Ex.Turbid (*)     Specific Gravity, Urine 1.021      pH, Urine 7.0      Protein, Urine 300 (3+) (*)     Glucose, Urine Normal      Blood, Urine 0.2 (2+) (*)     Ketones, Urine NEGATIVE      Bilirubin, Urine NEGATIVE      Urobilinogen, Urine Normal      Nitrite, Urine NEGATIVE      Leukocyte Esterase, Urine 500 Silvestre/uL (*)    MICROSCOPIC ONLY, URINE - Abnormal    WBC, Urine >50 (*)     WBC Clumps, Urine MANY      RBC, Urine >20 (*)     Bacteria, Urine 2+ (*)     Mucus, Urine 2+     MANUAL DIFFERENTIAL - Abnormal    Neutrophils  %, Manual 87.0      Lymphocytes %, Manual 8.0      Monocytes %, Manual 5.0      Eosinophils %, Manual 0.0      Basophils %, Manual 0.0      Seg Neutrophils Absolute, Manual 16.01 (*)     Lymphocytes Absolute, Manual 1.47      Monocytes Absolute, Manual 0.92      Eosinophils Absolute, Manual 0.00      Basophils Absolute, Manual 0.00      Total Cells Counted 100      RBC Morphology No significant RBC morphology present     LACTATE - Normal    Lactate 1.0      Narrative:     Venipuncture immediately after or during the administration of Metamizole may lead to falsely low results. Testing should be performed immediately prior to Metamizole dosing.   TROPONIN I, HIGH SENSITIVITY - Normal    Troponin I, High Sensitivity 8      Narrative:     Less than 99th percentile of normal range cutoff-  Female and children under 18 years old <14 ng/L; Male <21 ng/L: Negative  Repeat testing should be performed if clinically indicated.     Female and children under 18 years old 14-50 ng/L; Male 21-50 ng/L:  Consistent with possible cardiac damage and possible increased clinical   risk. Serial measurements may help to assess extent of myocardial damage.     >50 ng/L: Consistent with cardiac damage, increased clinical risk and  myocardial infarction. Serial measurements may help assess extent of   myocardial damage.      NOTE: Children less than 1 year old may have higher baseline troponin   levels and results should be interpreted in conjunction with the overall   clinical context.     NOTE: Troponin I testing is performed using a different   testing methodology at [facility] than at other   [facility]. Direct result comparisons should only   be made within the same method.   BLOOD CULTURE   BLOOD CULTURE   URINE CULTURE   URINALYSIS WITH REFLEX CULTURE AND MICROSCOPIC    Narrative:     The following orders were created for panel order Urinalysis with Reflex Culture and Microscopic.  Procedure                                Abnormality         Status                     ---------                               -----------         ------                     Urinalysis with Reflex C...[[id number]]  Abnormal            Final result               Extra Urine Gray Tube[[id number]]                            In process                   Please view results for these tests on the individual orders.   EXTRA URINE GRAY TUBE   MAGNESIUM   CBC   COMPREHENSIVE METABOLIC PANEL        XR chest 1 view   Final Result   1. Bibasilar atelectasis worse on the right. No acute abnormality                  MACRO:   None        Signed by: Cuate Garcia 6/8/2025 6:13 PM   Dictation workstation:   [id number]           ECG 12 Lead    Performed by: Tamra Scherer PA-C  Authorized by: Tamra Scherer PA-C    ECG interpreted by ED Physician in the absence of a cardiologist: yes    Comments:      EKG shows sinus rhythm with first-degree AV block.  Rate of 84 bpm.  No ST elevation.  PA interval is 242 ms and QRS duration is 94 ms.  EKG interpretation per myself, Tamra Scherer  Critical Care    Performed by: Tamra Scherer PA-C  Authorized by: Tamra Scherer PA-C    Critical care provider statement:     Critical care time (minutes):  40    Critical care time was exclusive of:  Separately billable procedures and treating other patients    Critical care was necessary to treat or prevent imminent or life-threatening deterioration of the following conditions:  Sepsis    Critical care was time spent personally by me on the following activities:  Ordering and performing treatments and interventions, ordering and review of laboratory studies, ordering and review of radiographic studies, pulse oximetry, re-evaluation of patient's condition and blood draw for specimens    Care discussed with: admitting provider         Medical Decision Making  Patient is a 65-year-old male with a history of multiple sclerosis with neurogenic bladder and a  suprapubic catheter in place who presented today for generalized weakness and concern for sepsis.  Patient was noted to be hypotensive upon arrival.  His pulse ox was originally documented at 89% on room air.  His fingers did not appear to be perfusing adequately and pulse ox was placed on his forehead, no evidence of hypoxia.  Patient denies chest pain or shortness of breath.  No focal neurologic deficits.  Given patient's presentation and high suspicion for sepsis, sepsis protocol initiated.  Patient given Zosyn and vancomycin.  Urinalysis shows findings consistent with a urinary infection.  Chest x-ray shows atelectasis with no consolidation.  Sodium is 133.  White blood cell count is 18.4.  Lactic acid is within normal limits.  Workup is otherwise unremarkable.  Patient's current blood pressure is 111/71.  Heart rate is 82.  He is 97% on room air. He has improved but ICU is recommended at this time    SEP-1 CORE MEASURE DATA      I suspected severe sepsis with the following organ dysfunction: acute circulatory dysfunction with hypotension responsive to fluids on 6/8/2025  5:39 PM.    /71   Pulse 83   Temp 36.8 °C (98.2 °F) (Axillary)   Resp 13   SpO2 98%      Lab Results       Component                Value               Date/Time                  WBC                      18.4 (H)            06/08/2025 1753            WBC                      19.1 (H)            06/08/2025 1455            Lactate                  1.0                 06/08/2025 1753            Creatinine               0.66                06/08/2025 1753            Creatinine               0.51                06/08/2025 1455            Bilirubin, Total         0.7                 06/08/2025 1753            Platelets                328                 06/08/2025 1753            Platelets                360                 06/08/2025 1455            Glucose                  95                  06/08/2025 1753            Glucose                   77                  06/08/2025 1455          A targeted fluid bolus of at least 30ml/kg based on ideal body weight due to obesity defined as BMI > 30. His body mass index is 22.43 kg/m².  Ideal body weight: 79.9 kg (176 lb 2.4 oz). was given.    Suspected infection source   UTI    Patient recently received an antibiotic (last 24 hours)     Date/Time Action Medication Dose Rate    06/08/25 1854 New Bag    vancomycin (Vancocin) 2 g in sodium chloride 0.9%  mL 2 g 250 mL/hr      06/08/25 1818 New Bag    piperacillin-tazobactam (Zosyn) 4.5 g in dextrose (iso)  mL 4.5 g       I performed a sepsis reperfusion exam on Raheem Hilton on 06/08/25 at 2055.      Amount and/or Complexity of Data Reviewed  Labs: ordered. Decision-making details documented in ED Course.  Radiology: ordered and independent interpretation performed. Decision-making details documented in ED Course.     Details: 1 view chest x-ray shows no infiltrate or pneumothorax  ECG/medicine tests: ordered and independent interpretation performed. Decision-making details documented in ED Course.    Risk  Decision regarding hospitalization.         ED Course as of 06/08/25 2136   Sun Jun 08, 2025 2101 BP: 90/56 [KM]      ED Course User Index  [KM] Tamra Scherer PA-C         Diagnoses as of 06/08/25 2136   Generalized weakness   Sepsis secondary to UTI (Multi)   Other specified hypotension   Urinary tract infection associated with indwelling urethral catheter, initial encounter   Leukocytosis, unspecified type                    Tamra Scherer PA-C  06/08/25 2136

## 2025-06-09 LAB
ALBUMIN SERPL BCP-MCNC: 3 G/DL (ref 3.4–5)
ALP SERPL-CCNC: 83 U/L (ref 33–136)
ALT SERPL W P-5'-P-CCNC: 12 U/L (ref 10–52)
ANION GAP SERPL CALC-SCNC: 10 MMOL/L (ref 10–20)
AST SERPL W P-5'-P-CCNC: 13 U/L (ref 9–39)
BILIRUB SERPL-MCNC: 0.8 MG/DL (ref 0–1.2)
BUN SERPL-MCNC: 11 MG/DL (ref 6–23)
CALCIUM SERPL-MCNC: 7.8 MG/DL (ref 8.6–10.3)
CHLORIDE SERPL-SCNC: 111 MMOL/L (ref 98–107)
CO2 SERPL-SCNC: 22 MMOL/L (ref 21–32)
CREAT SERPL-MCNC: 0.57 MG/DL (ref 0.5–1.3)
EGFRCR SERPLBLD CKD-EPI 2021: >90 ML/MIN/1.73M*2
ERYTHROCYTE [DISTWIDTH] IN BLOOD BY AUTOMATED COUNT: 13.6 % (ref 11.5–14.5)
GLUCOSE SERPL-MCNC: 82 MG/DL (ref 74–99)
HCT VFR BLD AUTO: 39.6 % (ref 41–52)
HGB BLD-MCNC: 12.8 G/DL (ref 13.5–17.5)
HOLD SPECIMEN: NORMAL
MAGNESIUM SERPL-MCNC: 1.92 MG/DL (ref 1.6–2.4)
MCH RBC QN AUTO: 30.7 PG (ref 26–34)
MCHC RBC AUTO-ENTMCNC: 32.3 G/DL (ref 32–36)
MCV RBC AUTO: 95 FL (ref 80–100)
NRBC BLD-RTO: 0 /100 WBCS (ref 0–0)
PLATELET # BLD AUTO: 301 X10*3/UL (ref 150–450)
POTASSIUM SERPL-SCNC: 3.5 MMOL/L (ref 3.5–5.3)
PROT SERPL-MCNC: 5.1 G/DL (ref 6.4–8.2)
RBC # BLD AUTO: 4.17 X10*6/UL (ref 4.5–5.9)
SODIUM SERPL-SCNC: 139 MMOL/L (ref 136–145)
WBC # BLD AUTO: 17.7 X10*3/UL (ref 4.4–11.3)

## 2025-06-09 PROCEDURE — 94664 DEMO&/EVAL PT USE INHALER: CPT

## 2025-06-09 PROCEDURE — 9420000001 HC RT PATIENT EDUCATION 5 MIN

## 2025-06-09 PROCEDURE — 2500000005 HC RX 250 GENERAL PHARMACY W/O HCPCS: Performed by: PHYSICIAN ASSISTANT

## 2025-06-09 PROCEDURE — 2020000001 HC ICU ROOM DAILY

## 2025-06-09 PROCEDURE — 83735 ASSAY OF MAGNESIUM: CPT | Performed by: STUDENT IN AN ORGANIZED HEALTH CARE EDUCATION/TRAINING PROGRAM

## 2025-06-09 PROCEDURE — 2500000004 HC RX 250 GENERAL PHARMACY W/ HCPCS (ALT 636 FOR OP/ED): Performed by: STUDENT IN AN ORGANIZED HEALTH CARE EDUCATION/TRAINING PROGRAM

## 2025-06-09 PROCEDURE — 85027 COMPLETE CBC AUTOMATED: CPT | Performed by: STUDENT IN AN ORGANIZED HEALTH CARE EDUCATION/TRAINING PROGRAM

## 2025-06-09 PROCEDURE — 2500000001 HC RX 250 WO HCPCS SELF ADMINISTERED DRUGS (ALT 637 FOR MEDICARE OP)

## 2025-06-09 PROCEDURE — 80053 COMPREHEN METABOLIC PANEL: CPT | Performed by: STUDENT IN AN ORGANIZED HEALTH CARE EDUCATION/TRAINING PROGRAM

## 2025-06-09 PROCEDURE — 99232 SBSQ HOSP IP/OBS MODERATE 35: CPT | Performed by: HOSPITALIST

## 2025-06-09 PROCEDURE — 36415 COLL VENOUS BLD VENIPUNCTURE: CPT | Performed by: STUDENT IN AN ORGANIZED HEALTH CARE EDUCATION/TRAINING PROGRAM

## 2025-06-09 PROCEDURE — 2500000001 HC RX 250 WO HCPCS SELF ADMINISTERED DRUGS (ALT 637 FOR MEDICARE OP): Performed by: STUDENT IN AN ORGANIZED HEALTH CARE EDUCATION/TRAINING PROGRAM

## 2025-06-09 RX ORDER — DEXTROMETHORPHAN POLISTIREX 30 MG/5 ML
1 SUSPENSION, EXTENDED RELEASE 12 HR ORAL ONCE
Status: ON HOLD | COMMUNITY
End: 2025-06-09 | Stop reason: ENTERED-IN-ERROR

## 2025-06-09 RX ORDER — CARBAMAZEPINE 200 MG/1
TABLET ORAL
Status: COMPLETED
Start: 2025-06-09 | End: 2025-06-09

## 2025-06-09 RX ORDER — AMOXICILLIN 250 MG
3 CAPSULE ORAL DAILY
COMMUNITY

## 2025-06-09 RX ORDER — METHIMAZOLE 5 MG/1
TABLET ORAL
Status: COMPLETED
Start: 2025-06-09 | End: 2025-06-09

## 2025-06-09 RX ORDER — AMOXICILLIN 250 MG
3 CAPSULE ORAL DAILY
Status: DISCONTINUED | OUTPATIENT
Start: 2025-06-09 | End: 2025-06-12 | Stop reason: HOSPADM

## 2025-06-09 RX ORDER — VANCOMYCIN 1.75 G/350ML
1250 INJECTION, SOLUTION INTRAVENOUS EVERY 12 HOURS
Status: DISCONTINUED | OUTPATIENT
Start: 2025-06-09 | End: 2025-06-09 | Stop reason: CLARIF

## 2025-06-09 RX ADMIN — HYDROCODONE BITARTRATE AND ACETAMINOPHEN 1 TABLET: 5; 325 TABLET ORAL at 08:43

## 2025-06-09 RX ADMIN — CARBAMAZEPINE 200 MG: 200 TABLET ORAL at 20:55

## 2025-06-09 RX ADMIN — PIPERACILLIN SODIUM AND TAZOBACTAM SODIUM 4.5 G: 4; .5 INJECTION, SOLUTION INTRAVENOUS at 00:56

## 2025-06-09 RX ADMIN — BACLOFEN 10 MG: 10 TABLET ORAL at 15:04

## 2025-06-09 RX ADMIN — PIPERACILLIN SODIUM AND TAZOBACTAM SODIUM 4.5 G: 4; .5 INJECTION, SOLUTION INTRAVENOUS at 11:11

## 2025-06-09 RX ADMIN — ATORVASTATIN CALCIUM 80 MG: 80 TABLET, FILM COATED ORAL at 00:15

## 2025-06-09 RX ADMIN — METHIMAZOLE 5 MG: 5 TABLET ORAL at 15:05

## 2025-06-09 RX ADMIN — BACLOFEN 10 MG: 10 TABLET ORAL at 08:46

## 2025-06-09 RX ADMIN — PIPERACILLIN SODIUM AND TAZOBACTAM SODIUM 4.5 G: 4; .5 INJECTION, SOLUTION INTRAVENOUS at 16:58

## 2025-06-09 RX ADMIN — CARBAMAZEPINE 200 MG: 200 TABLET ORAL at 08:45

## 2025-06-09 RX ADMIN — BACLOFEN 10 MG: 10 TABLET ORAL at 20:55

## 2025-06-09 RX ADMIN — GABAPENTIN 600 MG: 300 CAPSULE ORAL at 15:05

## 2025-06-09 RX ADMIN — METHIMAZOLE 5 MG: 5 TABLET ORAL at 08:45

## 2025-06-09 RX ADMIN — METOPROLOL SUCCINATE 25 MG: 25 TABLET, EXTENDED RELEASE ORAL at 08:44

## 2025-06-09 RX ADMIN — GABAPENTIN 600 MG: 300 CAPSULE ORAL at 00:15

## 2025-06-09 RX ADMIN — BACLOFEN 10 MG: 10 TABLET ORAL at 00:53

## 2025-06-09 RX ADMIN — GABAPENTIN 600 MG: 300 CAPSULE ORAL at 20:55

## 2025-06-09 RX ADMIN — METHIMAZOLE 5 MG: 5 TABLET ORAL at 20:55

## 2025-06-09 RX ADMIN — METHIMAZOLE 5 MG: 5 TABLET ORAL at 00:55

## 2025-06-09 RX ADMIN — HYDROCODONE BITARTRATE AND ACETAMINOPHEN 1 TABLET: 5; 325 TABLET ORAL at 20:55

## 2025-06-09 RX ADMIN — CARBAMAZEPINE 200 MG: 200 TABLET ORAL at 00:54

## 2025-06-09 RX ADMIN — POLYETHYLENE GLYCOL 3350 17 G: 17 POWDER, FOR SOLUTION ORAL at 08:46

## 2025-06-09 RX ADMIN — PIPERACILLIN SODIUM AND TAZOBACTAM SODIUM 4.5 G: 4; .5 INJECTION, SOLUTION INTRAVENOUS at 23:30

## 2025-06-09 RX ADMIN — HYDROCODONE BITARTRATE AND ACETAMINOPHEN 1 TABLET: 5; 325 TABLET ORAL at 00:15

## 2025-06-09 RX ADMIN — GABAPENTIN 600 MG: 300 CAPSULE ORAL at 08:43

## 2025-06-09 RX ADMIN — PIPERACILLIN SODIUM AND TAZOBACTAM SODIUM 4.5 G: 4; .5 INJECTION, SOLUTION INTRAVENOUS at 04:21

## 2025-06-09 RX ADMIN — Medication 21 PERCENT: at 18:30

## 2025-06-09 RX ADMIN — VANCOMYCIN HYDROCHLORIDE 1.25 G: 1.25 INJECTION, POWDER, LYOPHILIZED, FOR SOLUTION INTRAVENOUS at 09:51

## 2025-06-09 RX ADMIN — ATORVASTATIN CALCIUM 80 MG: 80 TABLET, FILM COATED ORAL at 20:55

## 2025-06-09 ASSESSMENT — PAIN SCALES - GENERAL
PAINLEVEL_OUTOF10: 0 - NO PAIN

## 2025-06-09 ASSESSMENT — COGNITIVE AND FUNCTIONAL STATUS - GENERAL
MOBILITY SCORE: 6
STANDING UP FROM CHAIR USING ARMS: TOTAL
MOVING FROM LYING ON BACK TO SITTING ON SIDE OF FLAT BED WITH BEDRAILS: TOTAL
CLIMB 3 TO 5 STEPS WITH RAILING: TOTAL
WALKING IN HOSPITAL ROOM: TOTAL
TURNING FROM BACK TO SIDE WHILE IN FLAT BAD: TOTAL
MOVING TO AND FROM BED TO CHAIR: TOTAL

## 2025-06-09 ASSESSMENT — PAIN - FUNCTIONAL ASSESSMENT
PAIN_FUNCTIONAL_ASSESSMENT: 0-10

## 2025-06-09 ASSESSMENT — ACTIVITIES OF DAILY LIVING (ADL): LACK_OF_TRANSPORTATION: NO

## 2025-06-09 NOTE — PROGRESS NOTES
Hospitalist Progress Note    Patient's name: Raheem Hilton  YOB: 1960  Age: 65 y.o.  Medical record number: [id number]    Date of service: 06/09/25  Time: 9:57 AM    Chief Complaint: Follow-up for sepsis, complicated UTI, history of multiple sclerosis.    Subjective:  Patient seen and examined.  He is awake, alert and oriented x 3.  He denied any symptoms.  He denied any pain.  He has been afebrile, blood pressure stable, heart rate stable, on room air.    Medications:  Current Medications[1]    Vital signs in last 24 hours:  Temp:  [35.9 °C (96.6 °F)-38.1 °C (100.6 °F)] 35.9 °C (96.6 °F)  Heart Rate:  [] 77  Resp:  [11-21] 18  BP: ()/(50-75) 130/69    Physical Exam:  General: Awake, alert, oriented x3, no significant distress, cooperative.  HEENT: EOM intact, PERRLA.  Neck: Supple, no JVD, no masses, no lymphadenopathy.  Chest: Diminished breath sounds bilateral, otherwise clear,  no wheezes, no crackles, no rhonchi.  Heart: Regular rate and rhythm, S1-S2 normal, no murmur, no gallops.  Abdomen: Soft, nontender, no organomegaly, no ascites, no guarding or rigidity, suprapubic catheter in place.  Neurological: Alert and oriented x3, cranial nerves are intact, weakness on the left lower extremity which is chronic, moving all other limbs.    Skin: No lesions, no skin rash.  Warm and dry.  Musculoskeletal: Normal, atraumatic, no obvious deformities.  Legs: No leg edema, no clubbing or cyanosis.  Psych: Appropriate mood and behavior.    LABS:  Lab Results   Component Value Date    WBC 17.7 (H) 06/09/2025    HGB 12.8 (L) 06/09/2025    HCT 39.6 (L) 06/09/2025    MCV 95 06/09/2025     06/09/2025     Lab Results   Component Value Date    GLUCOSE 82 06/09/2025    CALCIUM 7.8 (L) 06/09/2025     06/09/2025    K 3.5 06/09/2025    CO2 22 06/09/2025     (H) 06/09/2025    BUN 11 06/09/2025    CREATININE 0.57 06/09/2025     Assessment and Plan:    Sepsis  Complicated UTI in setting of  suprapubic catheter  Neurogenic bladder  -admit to ICU  -maintain MAP 60-70 and urine output >=0.5 mL/kg/hour -continue IVF  -vanc/zosyn  -blood cultures and urine culture pending  -will need catheter changed  -lactate WNL     HTN  HLD  MS  -continue home regimen     Fluids: /h  Nutrition:   Bowel prophylaxis:   DVT prophylaxis: Subcu Lovenox.    Daily progress/update:  6/9/2029:  Afebrile, blood pressure and heart rate are stable, on room air, he is on IV vancomycin and Zosyn, I doubt septic shock, sepsis is evident, today CBC and BMP reviewed as above, WBC is trending down, lactic acid was normal, blood and urine cultures are pending, plan to discontinue IV vancomycin, continue Zosyn, discontinue IV fluids, repeat CBC and BMP tomorrow morning, start Lovenox for DVT prophylaxis, continue other treatments.    MDM: Moderate complexity, time spent is 45 minutes.    ARABELLA BENZ MD.    06/09/25  1:22 PM         [1]   Current Facility-Administered Medications:     acetaminophen (Tylenol) tablet 650 mg, 650 mg, oral, q6h PRN, Angelita Low MD    alum-mag hydroxide-simeth (Mylanta) 200-200-20 mg/5 mL oral suspension 20 mL, 20 mL, oral, 4x daily PRN, Angelita Low MD    atorvastatin (Lipitor) tablet 80 mg, 80 mg, oral, Nightly, Angelita Low MD, 80 mg at 06/09/25 0015    baclofen (Lioresal) tablet 10 mg, 10 mg, oral, TID, Angelita Low MD, 10 mg at 06/09/25 0846    benzocaine-menthol (Cepastat Sore Throat) lozenge 1 lozenge, 1 lozenge, Mouth/Throat, q2h PRN, Angelita Low MD    benzonatate (Tessalon) capsule 100 mg, 100 mg, oral, TID PRN, Angelita Low MD    bisacodyl (Dulcolax) suppository 10 mg, 10 mg, rectal, Daily PRN, Angelita Low MD    calcium carbonate (Tums) 500 mg (200 mg elemental) chewable tablet 2 tablet, 2 tablet, oral, 4x daily PRN, Angelita Low MD    carBAMazepine (TEGretol) tablet 200 mg, 200 mg, oral, BID, Angelita Low MD, 200 mg at 06/09/25 0870     gabapentin (Neurontin) capsule 600 mg, 600 mg, oral, TID, Angelita Low MD, 600 mg at 06/09/25 0843    guaiFENesin (Mucinex) 12 hr tablet 600 mg, 600 mg, oral, BID PRN, Angelita Low MD    hydrALAZINE (Apresoline) injection 10 mg, 10 mg, intravenous, q4h PRN, Angelita Low MD    HYDROcodone-acetaminophen (Norco) 5-325 mg per tablet 1 tablet, 1 tablet, oral, BID, Angelita Low MD, 1 tablet at 06/09/25 0843    hydrOXYzine pamoate (Vistaril) capsule 25 mg, 25 mg, oral, q4h PRN, Angelita Low MD    loperamide (Imodium) capsule 2 mg, 2 mg, oral, PRN, Angelita Low MD    melatonin tablet 6 mg, 6 mg, oral, Nightly PRN, Angelita Low MD    methIMAzole (Tapazole) tablet 5 mg, 5 mg, oral, TID, Angelita Low MD, 5 mg at 06/09/25 0845    metoprolol succinate XL (Toprol-XL) 24 hr tablet 25 mg, 25 mg, oral, Daily, Angelita Low MD, 25 mg at 06/09/25 0844    metoprolol tartrate (Lopressor) injection 5 mg, 5 mg, intravenous, q6h PRN, Angelita Low MD    ondansetron (Zofran) injection 4 mg, 4 mg, intravenous, q4h PRN, Angelita Low MD    oxygen (O2) therapy, , inhalation, Continuous PRN - O2/gases, Tamra Scherer PA-C, 2 L/min at 06/08/25 1742    piperacillin-tazobactam (Zosyn) 4.5 g in dextrose (iso)  mL, 4.5 g, intravenous, q6h, Angelita oLw MD, Stopped at 06/09/25 0451    polyethylene glycol (Glycolax, Miralax) packet 17 g, 17 g, oral, Daily PRN, Angelita Low MD    polyethylene glycol (Glycolax, Miralax) packet 17 g, 17 g, oral, Daily, Angelita Low MD, 17 g at 06/09/25 0846    prochlorperazine (Compazine) injection 10 mg, 10 mg, intravenous, q6h PRN, Angelita Low MD    sodium chloride 0.9% infusion, 125 mL/hr, intravenous, Continuous, Angelita Low MD, Stopped at 06/09/25 0956    vancomycin (Vancocin) pharmacy to dose - pharmacy monitoring, , miscellaneous, Daily PRN, Angelita Low MD    vancomycin 1.25 g in dextrose 5% 250 mL IV,  1,250 mg, intravenous, q12h, Angelita Low MD, Last Rate: 200 mL/hr at 06/09/25 0951, 1.25 g at 06/09/25 0951

## 2025-06-09 NOTE — CONSULTS
Vancomycin Dosing by Pharmacy- INITIAL    Raheem Hilton is a 65 y.o. year old male who Pharmacy has been consulted for vancomycin dosing for other UTI. Based on the patient's indication and renal status this patient will be dosed based on a goal AUC of 400-600.     Renal function is currently stable.    Visit Vitals  /65   Pulse 72   Temp 36.3 °C (97.3 °F) (Temporal)   Resp 12        Lab Results   Component Value Date    CREATININE 0.57 2025    CREATININE 0.66 2025    CREATININE 0.51 2025    CREATININE 0.47 (L) 2025        Patient weight is as follows:   Vitals:    25 2300   Weight: 77.5 kg (170 lb 13.7 oz)       Cultures:  No results found for the encounter in last 14 days.        I/O last 3 completed shifts:  In: - (0 mL/kg)   Out: 800 (10.3 mL/kg) [Urine:800 (0.3 mL/kg/hr)]  Weight: 77.5 kg   I/O during current shift:  No intake/output data recorded.    Temp (24hrs), Av.9 °C (98.4 °F), Min:36.2 °C (97.2 °F), Max:38.1 °C (100.6 °F)         Assessment/Plan     Patient has already been given a loading dose of 2000 mg.  Will initiate vancomycin maintenance, 1250 mg every 12 hours.    This dosing regimen is predicted by [employer] to result in the following pharmacokinetic parameters:  Loading dose: 2000 mg  Regimen: 1250 mg IV every 12 hours.  Start time: 09:00 on 2025  Exposure target: AUC24 (range) 400-600 mg/L.hr   [id number]: 454 mg/L.hr  AUC24,ss: 495 mg/L.hr  Probability of AUC24 > 400: 71 %  Ctrough,ss: 14.4 mg/L  Probability of Ctrough,ss > 20: 26 %    Follow-up level will be ordered on 6/10 at 0600 unless clinically indicated sooner.  Will continue to monitor renal function daily while on vancomycin and order serum creatinine at least every 48 hours if not already ordered.  Follow for continued vancomycin needs, clinical response, and signs/symptoms of toxicity.        CYNTHIA Friday Jennie Otoole

## 2025-06-09 NOTE — PROGRESS NOTES
06/09/25 1545   Discharge Planning   Living Arrangements Other (Comment)  (Long-term care at [facility])   Support Systems Family members   Assistance Needed Intermediate level/ long-term care   Type of Residence Nursing home/residential care   Do you have animals or pets at home? No   Who is requesting discharge planning? Provider   Home or Post Acute Services Post acute facilities (Rehab/SNF/etc)   Type of Post Acute Facility Services Long term care   Expected Discharge Disposition Inter   Does the patient need discharge transport arranged? Yes   Financial Resource Strain   How hard is it for you to pay for the very basics like food, housing, medical care, and heating? Not hard   Housing Stability   In the last 12 months, was there a time when you were not able to pay the mortgage or rent on time? N   In the past 12 months, how many times have you moved where you were living? 0   Transportation Needs   In the past 12 months, has lack of transportation kept you from medical appointments or from getting medications? no   In the past 12 months, has lack of transportation kept you from meetings, work, or from getting things needed for daily living? No   Patient Choice   Patient / Family choosing to utilize agency / facility established prior to hospitalization Yes   Stroke Family Assessment   Stroke Family Assessment Needed No   Intensity of Service   Intensity of Service 0-30 min     Per medical team, patient will likely require another 24 hours in the hospital.  reviewed patient's chart and met with patient at bedside to perform initial assessment. Patient is known to medical team from multiple previous hospitalizations. Patient is a long-time resident at [facility]; Patient confirmed patient's wish to return there upon discharge. SW/DSC to attach/send return referral to [facility] via [facility]. Plan for patient is to return to [facility] to resume long-term care when medically  ready. Care Transitions to follow and assist. Mickie Levin, BELLE

## 2025-06-09 NOTE — H&P
History Of Present Illness  Raheem Hilton is a 65 y.o. male history of multiple sclerosis with neurogenic bladder and a suprapubic catheter in place who presented from SNF today for generalized weakness and concern for sepsis. Patient was noted to be hypotensive upon arrival. BP on arrival was 87/66 then dropped to 71/53. Pressure responded with IV fluids and currently is 111/71. His pulse ox was originally documented at 89% on room air. His fingers did not appear to be perfusing adequately and pulse ox was placed on his forehead, no evidence of hypoxia. Patient denies chest pain or shortness of breath. No focal neurologic deficits. Given patient's presentation and high suspicion for sepsis, sepsis protocol initiated. Patient given Zosyn and vancomycin. Urinalysis shows findings consistent with a urinary infection. Chest x-ray shows atelectasis with no consolidation. Sodium is 133. White blood cell count is 18.4. Lactic acid is within normal limits. Workup is otherwise unremarkable.   Chief Complaint   Patient presents with   • Weakness, Gen     Pt comes from [facility] for concern of UTI. Nursing home reports suprapubic cath clogged and changed this AM with 1200mL out. Pt also reporting numbness bilateral hands. Hx of stroke and sepsis          HPI obtained from chart review and report.  Notes from ED physician and nurse reviewed. Case discussed with attending ED physician.     Past Medical History  Medical History[1]     Surgical History  Surgical History[2]   Recent Surgeries in Hospitalist            No cases to display           Surgical History[3]      Social History  Social History     Substance and Sexual Activity   Alcohol Use None      Social History     Substance and Sexual Activity   Drug Use Never      Social History     Substance and Sexual Activity   Sexual Activity Defer      Tobacco Use History[4]   Food Insecurity: Patient Declined (3/2/2025)    Hunger Vital Sign    • Worried About  Running Out of Food in the Last Year: Patient declined    • Ran Out of Food in the Last Year: Patient declined      Financial Resource Strain: Patient Unable To Answer (3/3/2025)    Overall Financial Resource Strain (CARDIA)    • Difficulty of Paying Living Expenses: Patient unable to answer      Intimate Partner Violence: Not At Risk (3/2/2025)    Humiliation, Afraid, Rape, and Kick questionnaire    • Fear of Current or Ex-Partner: No    • Emotionally Abused: No    • Physically Abused: No    • Sexually Abused: No      Transportation Needs: Patient Unable To Answer (3/3/2025)    PRAPARE - Transportation    • Lack of Transportation (Medical): Patient unable to answer    • Lack of Transportation (Non-Medical): Patient unable to answer        Family History  Family History[5]      Allergies  RX Allergies[6]   Allergies[7]     Physical Exam   Physical Exam   Constitutional:       General: He is not in acute distress.     Appearance: He is well-developed. He is ill-appearing.   HENT:      Head: Normocephalic and atraumatic.      Mouth/Throat:      Mouth: Mucous membranes are dry.   Eyes:      Extraocular Movements: Extraocular movements intact.      Conjunctiva/sclera: Conjunctivae normal.   Cardiovascular:      Rate and Rhythm: Normal rate and regular rhythm.      Heart sounds: No murmur heard.  Pulmonary:      Effort: Pulmonary effort is normal. No respiratory distress.      Breath sounds: Normal breath sounds. No stridor. No wheezing, rhonchi or rales.   Abdominal:      General: There is no distension.      Palpations: Abdomen is soft. There is no mass.      Tenderness: There is no abdominal tenderness. There is no guarding or rebound.      Hernia: No hernia is present.   Musculoskeletal:         General: No swelling. Normal range of motion.      Cervical back: Normal range of motion and neck supple. No rigidity.   Skin:     General: Skin is warm and dry.      Capillary Refill: Capillary refill takes less than 2  "seconds.   Neurological:      General: No focal deficit present.      Mental Status: He is alert and oriented to person, place, and time.      Cranial Nerves: No cranial nerve deficit.      Sensory: No sensory deficit.      Motor: No weakness.   Psychiatric:         Mood and Affect: Mood normal.   Last Recorded Vitals  Visit Vitals  /71   Pulse 83   Temp 36.8 °C (98.2 °F) (Axillary)   Resp 13      Visit Vitals  /71   Pulse 83   Temp 36.8 °C (98.2 °F) (Axillary)   Resp 13   Ht 1.854 m (6' 1\")   Wt 77.1 kg (170 lb)   SpO2 98%   BMI 22.43 kg/m²   Smoking Status Never   BSA 1.99 m²        Relevant Results      Results for orders placed or performed during the hospital encounter of 06/08/25 (from the past 24 hours)   CBC and Auto Differential   Result Value Ref Range    WBC 18.4 (H) 4.4 - 11.3 x10*3/uL    nRBC 0.0 0.0 - 0.0 /100 WBCs    RBC 4.82 4.50 - 5.90 x10*6/uL    Hemoglobin 14.9 13.5 - 17.5 g/dL    Hematocrit 44.4 41.0 - 52.0 %    MCV 92 80 - 100 fL    MCH 30.9 26.0 - 34.0 pg    MCHC 33.6 32.0 - 36.0 g/dL    RDW 13.2 11.5 - 14.5 %    Platelets 328 150 - 450 x10*3/uL    Immature Granulocytes %, Automated 0.4 0.0 - 0.9 %    Immature Granulocytes Absolute, Automated 0.08 0.00 - 0.70 x10*3/uL   Comprehensive Metabolic Panel   Result Value Ref Range    Glucose 95 74 - 99 mg/dL    Sodium 133 (L) 136 - 145 mmol/L    Potassium 4.2 3.5 - 5.3 mmol/L    Chloride 102 98 - 107 mmol/L    Bicarbonate 23 21 - 32 mmol/L    Anion Gap 12 10 - 20 mmol/L    Urea Nitrogen 14 6 - 23 mg/dL    Creatinine 0.66 0.50 - 1.30 mg/dL    eGFR >90 >60 mL/min/1.73m*2    Calcium 8.9 8.6 - 10.3 mg/dL    Albumin 3.8 3.4 - 5.0 g/dL    Alkaline Phosphatase 115 33 - 136 U/L    Total Protein 6.6 6.4 - 8.2 g/dL    AST 19 9 - 39 U/L    Bilirubin, Total 0.7 0.0 - 1.2 mg/dL    ALT 18 10 - 52 U/L   Lactate   Result Value Ref Range    Lactate 1.0 0.4 - 2.0 mmol/L   Troponin I, High Sensitivity   Result Value Ref Range    Troponin I, High Sensitivity " 8 0 - 20 ng/L   Manual Differential   Result Value Ref Range    Neutrophils %, Manual 87.0 40.0 - 80.0 %    Lymphocytes %, Manual 8.0 13.0 - 44.0 %    Monocytes %, Manual 5.0 2.0 - 10.0 %    Eosinophils %, Manual 0.0 0.0 - 6.0 %    Basophils %, Manual 0.0 0.0 - 2.0 %    Seg Neutrophils Absolute, Manual 16.01 (H) 1.20 - 7.00 x10*3/uL    Lymphocytes Absolute, Manual 1.47 1.20 - 4.80 x10*3/uL    Monocytes Absolute, Manual 0.92 0.10 - 1.00 x10*3/uL    Eosinophils Absolute, Manual 0.00 0.00 - 0.70 x10*3/uL    Basophils Absolute, Manual 0.00 0.00 - 0.10 x10*3/uL    Total Cells Counted 100     RBC Morphology No significant RBC morphology present    Urinalysis with Reflex Culture and Microscopic   Result Value Ref Range    Color, Urine Orange (N) Light-Yellow, Yellow, Dark-Yellow    Appearance, Urine Ex.Turbid (N) Clear    Specific Gravity, Urine 1.021 1.005 - 1.035    pH, Urine 7.0 5.0, 5.5, 6.0, 6.5, 7.0, 7.5, 8.0    Protein, Urine 300 (3+) (A) NEGATIVE, 10 (TRACE), 20 (TRACE) mg/dL    Glucose, Urine Normal Normal mg/dL    Blood, Urine 0.2 (2+) (A) NEGATIVE mg/dL    Ketones, Urine NEGATIVE NEGATIVE mg/dL    Bilirubin, Urine NEGATIVE NEGATIVE mg/dL    Urobilinogen, Urine Normal Normal mg/dL    Nitrite, Urine NEGATIVE NEGATIVE    Leukocyte Esterase, Urine 500 Silvestre/uL (A) NEGATIVE   Microscopic Only, Urine   Result Value Ref Range    WBC, Urine >50 (A) 1-5, NONE /HPF    WBC Clumps, Urine MANY Reference range not established. /HPF    RBC, Urine >20 (A) NONE, 1-2, 3-5 /HPF    Bacteria, Urine 2+ (A) NONE SEEN /HPF    Mucus, Urine 2+ Reference range not established. /LPF      Imaging  XR chest 1 view  Result Date: 6/8/2025  1. Bibasilar atelectasis worse on the right. No acute abnormality       MACRO: None   Signed by: Cuate Garcia 6/8/2025 6:13 PM Dictation workstation:   [id number]      Cardiology, Vascular, and Other Imaging  No other imaging results found for the past 2 days       I personally reviewed and interpreted  the above results, multiple of which contributed to my medical making decisions.        Assessment/Plan       Septic Shock  UTI in setting of suprapubic catheter  Neurogenic bladder  -admit to ICU  -maintain MAP 60-70 and urine output >=0.5 mL/kg/hour -continue IVF  -vanc/zosyn  -blood cultures and urine culture pending  -will need catheter changed  -lactate WNL    HTN  HLD  MS  -continue home regimen    Fluids: /h  Nutrition:   Bowel prophylaxis:   DVT prophylaxis:          Angelita Low MD            [1]  Past Medical History:  Diagnosis Date   • Anemia    • Bowel perforation (Multi)    • Buttock wound, unspecified laterality, subsequent encounter    • COPD (chronic obstructive pulmonary disease) (Multi)    • Extended spectrum beta lactamase (ESBL) resistance    • GERD (gastroesophageal reflux disease)    • Hyperlipidemia    • Hypertension    • Hypo-osmolality and hyponatremia    • Insomnia    • Multiple sclerosis (Multi)    • Neurogenic bladder    • Neuromuscular dysfunction of bladder    • Personal history of transient ischemic attack (TIA), and cerebral infarction without residual deficits 08/06/2020    History of embolic stroke   • Pulmonary embolism    • Sepsis with encephalopathy (Multi)    • Septic shock (Multi)    • Simple febrile convulsions (Multi)    • Status post administration of tPA (rtPA) in a different facility within the last 24 hours prior to admission to current facility 08/06/2020    Tissue plasminogen activator (tPA) administered at other facility within 24 hours before current admission   • UTI (urinary tract infection)    [2]  Past Surgical History:  Procedure Laterality Date   • MR HEAD ANGIO WO IV CONTRAST  6/29/2020    MR HEAD ANGIO WO IV CONTRAST 6/29/2020 [facility]   • MR NECK ANGIO WO IV CONTRAST  6/29/2020    MR NECK ANGIO WO IV CONTRAST 6/29/2020 [facility]   [3]  Past Surgical History:  Procedure Laterality Date   • MR HEAD ANGIO WO IV CONTRAST   6/29/2020    MR HEAD ANGIO WO IV CONTRAST 6/29/2020 [facility]   • MR NECK ANGIO WO IV CONTRAST  6/29/2020    MR NECK ANGIO WO IV CONTRAST 6/29/2020 [facility]   [4]  Social History  Tobacco Use   Smoking Status Never   Smokeless Tobacco Never   [5]  No family history on file.  [6]  Allergies  Allergen Reactions   • Cefepime Unknown   • Doxycycline Unknown   • Heparin Unknown   • Sulfamethoxazole-Trimethoprim Unknown   [7]  Allergies  Allergen Reactions   • Cefepime Unknown   • Doxycycline Unknown   • Heparin Unknown   • Sulfamethoxazole-Trimethoprim Unknown

## 2025-06-09 NOTE — CONSULTS
"Nutrition Initial Assessment:   Nutrition Assessment    Reason for Assessment: Admission nursing screening    Patient is a 65 y.o. male presenting with generalized weakness and concern for sepsis. PMH significant for multiple sclerosis with neurogenic bladder and a suprapubic catheter, COPD, GERD, HLD, HTN, TIA.    Patient was assessed virtually.    Nutrition History:  Food and Nutrient History: Multiple attempts to reach pt unsuccessful. Per admission nursing screening, pt reported eating poorly due to decreased appetite. Per flowsheets, pt with 25% intake at 2 documented meals since admission.       Anthropometrics:  Height: 185.4 cm (6' 1\")   Weight: 77.5 kg (170 lb 13.7 oz)   BMI (Calculated): 22.55  IBW/kg (Dietitian Calculated): 83.6 kg  Percent of IBW: 93 %                      Weight History:   Wt Readings from Last 15 Encounters:   06/09/25 77.5 kg (170 lb 13.7 oz)   04/28/25 78 kg (172 lb)   04/05/25 78.2 kg (172 lb 4.8 oz)   03/02/25 78.3 kg (172 lb 9.9 oz)   01/19/25 80.3 kg (177 lb)   10/27/24 79.4 kg (175 lb)   10/22/24 77.1 kg (170 lb)   09/10/24 80 kg (176 lb 5.9 oz)   09/09/24 78 kg (171 lb 15.3 oz)   08/20/24 77.1 kg (170 lb)   08/12/24 78.3 kg (172 lb 9.6 oz)   07/31/24 78 kg (172 lb)   05/08/24 77.1 kg (170 lb)   04/27/24 77.1 kg (170 lb)   04/19/24 79 kg (174 lb 1.6 oz)     Weight Change %:  Weight History / % Weight Change: Per chart review, pt with slight weight fluctuations, overall stable x 1 year.  Significant Weight Loss: No    Nutrition Focused Physical Exam Findings:    Subcutaneous Fat Loss:   Defer Subcutaneous Fat Loss Assessment: Defer all  Defer All Reason: remote assessment  Muscle Wasting:  Defer Muscle Wasting Assessment: Defer all  Defer All Reason: remote assessment  Edema:  Edema: none  Physical Findings:  Skin: Positive  Positive Skin Findings: Unstageable pressure injury (Unstaged pressure injury to buttock)    Nutrition Significant Labs:  BMP Trend:   Results from last 7 " days   Lab Units 06/09/25  0416 06/08/25  1753 06/08/25  1455   GLUCOSE mg/dL 82 95 77   CALCIUM mg/dL 7.8* 8.9 9.2   SODIUM mmol/L 139 133* 132*   POTASSIUM mmol/L 3.5 4.2 4.5   CO2 mmol/L 22 23 21   CHLORIDE mmol/L 111* 102 99   BUN mg/dL 11 14 13   CREATININE mg/dL 0.57 0.66 0.51    , A1C:  Lab Results   Component Value Date    HGBA1C 6.2 (H) 12/28/2021   , Vit D:   Lab Results   Component Value Date    VITD25 57 10/06/2022        Nutrition Specific Medications:  Scheduled medications  Scheduled Medications[1]  Continuous medications  Continuous Medications[2]  PRN medications  PRN Medications[3]      I/O:   Last BM Date: 05/09/25; Stool Appearance: Loose (06/09/25 1200)    Dietary Orders (From admission, onward)       Start     Ordered    06/09/25 0449  Adult diet Regular  Diet effective now        Question:  Diet type  Answer:  Regular    06/09/25 0448    06/08/25 2304  May Participate in Room Service With Assistance  ( ROOM SERVICE MAY PARTICIPATE WITH ASSISTANCE)  Once        Question:  .  Answer:  Yes    06/08/25 2303                     Estimated Needs:   Total Energy Estimated Needs in 24 hours (kCal): 2325 kCal  Method for Estimating Needs: 30 kcal/kg ABW (77.5 kg)  Total Protein Estimated Needs in 24 Hours (g): 116 g  Method for Estimating 24 Hour Protein Needs: 1.5 g/kg ABW (77.5 kg)  Total Fluid Estimated Needs in 24 Hours (mL): 2325 mL  Method for Estimating 24 Hour Fluid Needs: 1 ml/kcal or per provider        Nutrition Diagnosis   Malnutrition Diagnosis  Patient has Malnutrition Diagnosis: No    Nutrition Diagnosis  Patient has Nutrition Diagnosis: Yes  Diagnosis Status (1): New  Nutrition Diagnosis 1: Inadequate oral intake  Related to (1): decreased ability to consume sufficient energy  As Evidenced by (1): pt report of decreased intakes, po intakes <50% since admission       Nutrition Interventions/Recommendations   Nutrition prescription for oral nutrition    Nutrition  Recommendations:  Individualized Nutrition Prescription Provided for : Continue diet as ordered. Recommend addition of Ensure Plus High Protein TID.    Nutrition Interventions/Goals:   Meals and Snacks: General healthful diet  Goal: Pt to tolerate regular diet as ordered  Medical Food Supplement: Commercial beverage medical food supplement therapy  Goal: Ensure Plus High Protein TID to provide 350 kcal, 20 g protein each      Education Documentation  No documentation found.            Nutrition Monitoring and Evaluation   Food/Nutrient Related History Monitoring  Monitoring and Evaluation Plan: Intake / amount of food  Intake / Amount of food: Consumes at least 50% or more of meals/snacks/supplements    Anthropometric Measurements  Monitoring and Evaluation Plan: Body weight  Body Weight: Body weight - Maintain stable weight              Goal Status: New goal(s) identified    Time Spent (min): 60 minutes           06/09/25 at 5:23 PM - CLOVIS SHAVER RDN, LD         [1] atorvastatin, 80 mg, oral, Nightly  baclofen, 10 mg, oral, TID  carBAMazepine, 200 mg, oral, BID  gabapentin, 600 mg, oral, TID  HYDROcodone-acetaminophen, 1 tablet, oral, BID  methIMAzole, 5 mg, oral, TID  metoprolol succinate XL, 25 mg, oral, Daily  piperacillin-tazobactam, 4.5 g, intravenous, q6h  polyethylene glycol, 17 g, oral, Daily  sennosides-docusate sodium, 3 tablet, oral, Daily     [2]    [3] PRN medications: acetaminophen, alum-mag hydroxide-simeth, benzocaine-menthol, benzonatate, bisacodyl, calcium carbonate, guaiFENesin, hydrALAZINE, hydrOXYzine pamoate, loperamide, melatonin, metoprolol, ondansetron, oxygen, polyethylene glycol, prochlorperazine

## 2025-06-09 NOTE — CARE PLAN
Problem: Pain - Adult  Goal: Verbalizes/displays adequate comfort level or baseline comfort level  Outcome: Progressing     Problem: Safety - Adult  Goal: Free from fall injury  Outcome: Progressing     Problem: Discharge Planning  Goal: Discharge to home or other facility with appropriate resources  Outcome: Progressing     Problem: Chronic Conditions and Co-morbidities  Goal: Patient's chronic conditions and co-morbidity symptoms are monitored and maintained or improved  Outcome: Progressing     Problem: Nutrition  Goal: Nutrient intake appropriate for maintaining nutritional needs  Outcome: Progressing     Problem: Skin  Goal: Decreased wound size/increased tissue granulation at next dressing change  Outcome: Progressing  Goal: Participates in plan/prevention/treatment measures  Outcome: Progressing  Goal: Prevent/manage excess moisture  Outcome: Progressing  Goal: Prevent/minimize sheer/friction injuries  Outcome: Progressing  Goal: Promote/optimize nutrition  Outcome: Progressing  Goal: Promote skin healing  Outcome: Progressing     Problem: Pain  Goal: Takes deep breaths with improved pain control throughout the shift  Outcome: Progressing  Goal: Turns in bed with improved pain control throughout the shift  Outcome: Progressing  Goal: Walks with improved pain control throughout the shift  Outcome: Progressing  Goal: Performs ADL's with improved pain control throughout shift  Outcome: Progressing  Goal: Participates in PT with improved pain control throughout the shift  Outcome: Progressing  Goal: Free from opioid side effects throughout the shift  Outcome: Progressing  Goal: Free from acute confusion related to pain meds throughout the shift  Outcome: Progressing

## 2025-06-10 LAB
ANION GAP SERPL CALC-SCNC: 10 MMOL/L (ref 10–20)
BACTERIA UR CULT: NORMAL
BACTERIA UR CULT: NORMAL
BUN SERPL-MCNC: 9 MG/DL (ref 6–23)
CALCIUM SERPL-MCNC: 7.8 MG/DL (ref 8.6–10.3)
CHLORIDE SERPL-SCNC: 108 MMOL/L (ref 98–107)
CO2 SERPL-SCNC: 22 MMOL/L (ref 21–32)
CREAT SERPL-MCNC: 0.49 MG/DL (ref 0.5–1.3)
EGFRCR SERPLBLD CKD-EPI 2021: >90 ML/MIN/1.73M*2
ERYTHROCYTE [DISTWIDTH] IN BLOOD BY AUTOMATED COUNT: 13.9 % (ref 11.5–14.5)
GLUCOSE SERPL-MCNC: 93 MG/DL (ref 74–99)
HCT VFR BLD AUTO: 38.8 % (ref 41–52)
HGB BLD-MCNC: 12.1 G/DL (ref 13.5–17.5)
MCH RBC QN AUTO: 30.9 PG (ref 26–34)
MCHC RBC AUTO-ENTMCNC: 31.2 G/DL (ref 32–36)
MCV RBC AUTO: 99 FL (ref 80–100)
NRBC BLD-RTO: 0 /100 WBCS (ref 0–0)
PLATELET # BLD AUTO: 230 X10*3/UL (ref 150–450)
POTASSIUM SERPL-SCNC: 3.5 MMOL/L (ref 3.5–5.3)
RBC # BLD AUTO: 3.91 X10*6/UL (ref 4.5–5.9)
SODIUM SERPL-SCNC: 136 MMOL/L (ref 136–145)
WBC # BLD AUTO: 11.5 X10*3/UL (ref 4.4–11.3)

## 2025-06-10 PROCEDURE — 2500000004 HC RX 250 GENERAL PHARMACY W/ HCPCS (ALT 636 FOR OP/ED): Performed by: STUDENT IN AN ORGANIZED HEALTH CARE EDUCATION/TRAINING PROGRAM

## 2025-06-10 PROCEDURE — 2500000001 HC RX 250 WO HCPCS SELF ADMINISTERED DRUGS (ALT 637 FOR MEDICARE OP): Performed by: STUDENT IN AN ORGANIZED HEALTH CARE EDUCATION/TRAINING PROGRAM

## 2025-06-10 PROCEDURE — 85027 COMPLETE CBC AUTOMATED: CPT | Performed by: HOSPITALIST

## 2025-06-10 PROCEDURE — 1200000002 HC GENERAL ROOM WITH TELEMETRY DAILY

## 2025-06-10 PROCEDURE — 80048 BASIC METABOLIC PNL TOTAL CA: CPT | Performed by: HOSPITALIST

## 2025-06-10 PROCEDURE — 99232 SBSQ HOSP IP/OBS MODERATE 35: CPT | Performed by: HOSPITALIST

## 2025-06-10 PROCEDURE — 2500000004 HC RX 250 GENERAL PHARMACY W/ HCPCS (ALT 636 FOR OP/ED): Performed by: HOSPITALIST

## 2025-06-10 PROCEDURE — 36415 COLL VENOUS BLD VENIPUNCTURE: CPT | Performed by: HOSPITALIST

## 2025-06-10 PROCEDURE — 1100000001 HC PRIVATE ROOM DAILY

## 2025-06-10 PROCEDURE — 2500000001 HC RX 250 WO HCPCS SELF ADMINISTERED DRUGS (ALT 637 FOR MEDICARE OP): Performed by: HOSPITALIST

## 2025-06-10 RX ORDER — ENOXAPARIN SODIUM 100 MG/ML
40 INJECTION SUBCUTANEOUS DAILY
Status: DISCONTINUED | OUTPATIENT
Start: 2025-06-10 | End: 2025-06-12 | Stop reason: HOSPADM

## 2025-06-10 RX ADMIN — PIPERACILLIN SODIUM AND TAZOBACTAM SODIUM 4.5 G: 4; .5 INJECTION, SOLUTION INTRAVENOUS at 23:44

## 2025-06-10 RX ADMIN — PIPERACILLIN SODIUM AND TAZOBACTAM SODIUM 4.5 G: 4; .5 INJECTION, SOLUTION INTRAVENOUS at 17:27

## 2025-06-10 RX ADMIN — PIPERACILLIN SODIUM AND TAZOBACTAM SODIUM 4.5 G: 4; .5 INJECTION, SOLUTION INTRAVENOUS at 05:59

## 2025-06-10 RX ADMIN — BACLOFEN 10 MG: 10 TABLET ORAL at 20:30

## 2025-06-10 RX ADMIN — ACETAMINOPHEN 650 MG: 325 TABLET ORAL at 14:41

## 2025-06-10 RX ADMIN — HYDROCODONE BITARTRATE AND ACETAMINOPHEN 1 TABLET: 5; 325 TABLET ORAL at 10:28

## 2025-06-10 RX ADMIN — METOPROLOL SUCCINATE 25 MG: 25 TABLET, EXTENDED RELEASE ORAL at 10:29

## 2025-06-10 RX ADMIN — POLYETHYLENE GLYCOL 3350 17 G: 17 POWDER, FOR SOLUTION ORAL at 10:29

## 2025-06-10 RX ADMIN — METHIMAZOLE 5 MG: 5 TABLET ORAL at 10:27

## 2025-06-10 RX ADMIN — PIPERACILLIN SODIUM AND TAZOBACTAM SODIUM 4.5 G: 4; .5 INJECTION, SOLUTION INTRAVENOUS at 11:03

## 2025-06-10 RX ADMIN — GABAPENTIN 600 MG: 300 CAPSULE ORAL at 14:41

## 2025-06-10 RX ADMIN — HYDROCODONE BITARTRATE AND ACETAMINOPHEN 1 TABLET: 5; 325 TABLET ORAL at 20:30

## 2025-06-10 RX ADMIN — ATORVASTATIN CALCIUM 80 MG: 80 TABLET, FILM COATED ORAL at 20:30

## 2025-06-10 RX ADMIN — ENOXAPARIN SODIUM 40 MG: 40 INJECTION SUBCUTANEOUS at 11:03

## 2025-06-10 RX ADMIN — BACLOFEN 10 MG: 10 TABLET ORAL at 10:28

## 2025-06-10 RX ADMIN — GABAPENTIN 600 MG: 300 CAPSULE ORAL at 20:30

## 2025-06-10 RX ADMIN — METHIMAZOLE 5 MG: 5 TABLET ORAL at 20:30

## 2025-06-10 RX ADMIN — CARBAMAZEPINE 200 MG: 200 TABLET ORAL at 20:30

## 2025-06-10 RX ADMIN — METHIMAZOLE 5 MG: 5 TABLET ORAL at 14:43

## 2025-06-10 RX ADMIN — CARBAMAZEPINE 200 MG: 200 TABLET ORAL at 10:27

## 2025-06-10 RX ADMIN — GABAPENTIN 600 MG: 300 CAPSULE ORAL at 10:28

## 2025-06-10 RX ADMIN — SENNOSIDES AND DOCUSATE SODIUM 3 TABLET: 50; 8.6 TABLET ORAL at 10:26

## 2025-06-10 RX ADMIN — BACLOFEN 10 MG: 10 TABLET ORAL at 14:42

## 2025-06-10 ASSESSMENT — COGNITIVE AND FUNCTIONAL STATUS - GENERAL
MOVING TO AND FROM BED TO CHAIR: A LOT
WALKING IN HOSPITAL ROOM: TOTAL
STANDING UP FROM CHAIR USING ARMS: TOTAL
TURNING FROM BACK TO SIDE WHILE IN FLAT BAD: A LOT
CLIMB 3 TO 5 STEPS WITH RAILING: TOTAL
MOVING FROM LYING ON BACK TO SITTING ON SIDE OF FLAT BED WITH BEDRAILS: A LITTLE
MOBILITY SCORE: 10

## 2025-06-10 ASSESSMENT — PAIN SCALES - GENERAL
PAINLEVEL_OUTOF10: 3
PAINLEVEL_OUTOF10: 0 - NO PAIN
PAINLEVEL_OUTOF10: 3
PAINLEVEL_OUTOF10: 0 - NO PAIN

## 2025-06-10 ASSESSMENT — PAIN DESCRIPTION - ORIENTATION: ORIENTATION: RIGHT;LEFT

## 2025-06-10 ASSESSMENT — PAIN - FUNCTIONAL ASSESSMENT
PAIN_FUNCTIONAL_ASSESSMENT: 0-10

## 2025-06-10 ASSESSMENT — PAIN DESCRIPTION - DESCRIPTORS
DESCRIPTORS: ACHING
DESCRIPTORS: DULL

## 2025-06-10 ASSESSMENT — PAIN DESCRIPTION - LOCATION: LOCATION: LEG

## 2025-06-10 NOTE — PROGRESS NOTES
Hospitalist Progress Note    Patient's name: Raheem Hilton  YOB: 1960  Age: 65 y.o.  Medical record number: [id number]    Date of service: 06/10/25  Time: 9:23 AM    Chief Complaint: Follow-up for sepsis, complicated UTI, bacteremia, history of multiple sclerosis.    Subjective:  Patient seen and examined.  Today, patient is feeling okay, no complaints.  He denied any symptoms.  Denied being dizzy or lightheaded.  Denied urinary symptoms.  His vitals are stable, afebrile, on room air.    Medications:  Current Medications[1]    Vital signs in last 24 hours:  Temp:  [35.7 °C (96.3 °F)-36.7 °C (98.1 °F)] 36.3 °C (97.3 °F)  Heart Rate:  [53-75] 71  Resp:  [11-21] 13  BP: ()/(51-80) 127/74    Physical Exam:  General: Awake, alert, oriented x3, no significant distress, cooperative.  HEENT: EOM intact, PERRLA.  Neck: Supple, no JVD, no masses, no lymphadenopathy.  Chest: Diminished breath sounds bilateral, otherwise clear,  no wheezes, no crackles, no rhonchi.  Heart: Regular rate and rhythm, S1-S2 normal, no murmur, no gallops.  Abdomen: Soft, nontender, no organomegaly, no ascites, no guarding or rigidity, suprapubic catheter in place.  Neurological: Alert and oriented x3, cranial nerves are intact, weakness on the left lower extremity which is chronic, moving all other limbs.    Skin: No lesions, no skin rash.  Warm and dry.  Musculoskeletal: Normal, atraumatic, no obvious deformities.  Legs: No leg edema, no clubbing or cyanosis.  Psych: Appropriate mood and behavior.    LABS:  Lab Results   Component Value Date    WBC 11.5 (H) 06/10/2025    HGB 12.1 (L) 06/10/2025    HCT 38.8 (L) 06/10/2025    MCV 99 06/10/2025     06/10/2025     Lab Results   Component Value Date    GLUCOSE 93 06/10/2025    CALCIUM 7.8 (L) 06/10/2025     06/10/2025    K 3.5 06/10/2025    CO2 22 06/10/2025     (H) 06/10/2025    BUN 9 06/10/2025    CREATININE 0.49 (L) 06/10/2025     Assessment and  Plan:    Sepsis  Complicated UTI in setting of suprapubic catheter  Neurogenic bladder  -admit to ICU  -maintain MAP 60-70 and urine output >=0.5 mL/kg/hour -continue IVF  -vanc/zosyn  -blood cultures and urine culture pending  -will need catheter changed  -lactate WNL     HTN  HLD  MS  -continue home regimen     Fluids: /h  Nutrition:   Bowel prophylaxis:   DVT prophylaxis: Subcu Lovenox.    Daily progress/update:  6/9/2029:  Afebrile, blood pressure and heart rate are stable, on room air, he is on IV vancomycin and Zosyn, I doubt septic shock, sepsis is evident, today CBC and BMP reviewed as above, WBC is trending down, lactic acid was normal, blood and urine cultures are pending, plan to discontinue IV vancomycin, continue Zosyn, discontinue IV fluids, repeat CBC and BMP tomorrow morning, start Lovenox for DVT prophylaxis, continue other treatments.  6/10/2025:  Hemodynamically stable, afebrile, on room air, remains on IV Zosyn, today CBC and BMP reviewed as above, WBC is trending down, improving, urine culture showed contamination with mixed bacterial ary, 1 bottle of blood culture revealed gram-positive cocci in clusters, plan to continue IV antibiotics, follow blood cultures, anticipate discharge back to SNF in the next 24 to 48 hours.    MDM: Moderate complexity, time spent is 46 minutes.    ARABELLA BENZ MD.    06/10/25  12:41 PM         [1]   Current Facility-Administered Medications:     acetaminophen (Tylenol) tablet 650 mg, 650 mg, oral, q6h PRN, Angelita Low MD    alum-mag hydroxide-simeth (Mylanta) 200-200-20 mg/5 mL oral suspension 20 mL, 20 mL, oral, 4x daily PRN, Angelita Low MD    atorvastatin (Lipitor) tablet 80 mg, 80 mg, oral, Nightly, Angelita Low MD, 80 mg at 06/09/25 2055    baclofen (Lioresal) tablet 10 mg, 10 mg, oral, TID, Angelita Low MD, 10 mg at 06/09/25 2055    benzocaine-menthol (Cepastat Sore Throat) lozenge 1 lozenge, 1 lozenge, Mouth/Throat, q2h  PRN, Angelita Low MD    benzonatate (Tessalon) capsule 100 mg, 100 mg, oral, TID PRN, Angelita Low MD    bisacodyl (Dulcolax) suppository 10 mg, 10 mg, rectal, Daily PRN, Angelita Low MD    calcium carbonate (Tums) 500 mg (200 mg elemental) chewable tablet 2 tablet, 2 tablet, oral, 4x daily PRN, Angelita Low MD    carBAMazepine (TEGretol) tablet 200 mg, 200 mg, oral, BID, Angelita Low MD, 200 mg at 06/09/25 2055    enoxaparin (Lovenox) syringe 40 mg, 40 mg, subcutaneous, Daily, Nyla Antonio MD    gabapentin (Neurontin) capsule 600 mg, 600 mg, oral, TID, Angelita Low MD, 600 mg at 06/09/25 2055    guaiFENesin (Mucinex) 12 hr tablet 600 mg, 600 mg, oral, BID PRN, Angelita Low MD    hydrALAZINE (Apresoline) injection 10 mg, 10 mg, intravenous, q4h PRN, Angelita Low MD    HYDROcodone-acetaminophen (Norco) 5-325 mg per tablet 1 tablet, 1 tablet, oral, BID, Angelita Low MD, 1 tablet at 06/09/25 2055    hydrOXYzine pamoate (Vistaril) capsule 25 mg, 25 mg, oral, q4h PRN, Angelita Low MD    loperamide (Imodium) capsule 2 mg, 2 mg, oral, PRN, Angelita Low MD    melatonin tablet 6 mg, 6 mg, oral, Nightly PRN, Angelita Low MD    methIMAzole (Tapazole) tablet 5 mg, 5 mg, oral, TID, Angelita Low MD, 5 mg at 06/09/25 2055    metoprolol succinate XL (Toprol-XL) 24 hr tablet 25 mg, 25 mg, oral, Daily, Angelita oLw MD, 25 mg at 06/09/25 0844    metoprolol tartrate (Lopressor) injection 5 mg, 5 mg, intravenous, q6h PRN, Angelita Low MD    ondansetron (Zofran) injection 4 mg, 4 mg, intravenous, q4h PRN, Angelita Low MD    oxygen (O2) therapy, , inhalation, Continuous PRN - O2/gases, Tamra Scherer PA-C, 21 percent at 06/09/25 1830    piperacillin-tazobactam (Zosyn) 4.5 g in dextrose (iso)  mL, 4.5 g, intravenous, q6h, Angelita Low MD, Stopped at 06/10/25 0653    polyethylene glycol (Glycolax, Miralax) packet 17 g, 17  g, oral, Daily PRN, Angelita Low MD    polyethylene glycol (Glycolax, Miralax) packet 17 g, 17 g, oral, Daily, Angelita Low MD, 17 g at 06/09/25 0846    prochlorperazine (Compazine) injection 10 mg, 10 mg, intravenous, q6h PRN, Angelita Low MD    sennosides-docusate sodium (Melisa-Colace) 8.6-50 mg per tablet 3 tablet, 3 tablet, oral, Daily, Nyla Antonio MD

## 2025-06-10 NOTE — PROGRESS NOTES
Per medical team, patient is not yet medically appropriate for discharge today; will likely require another 24 to 48 hours in the hospital. Medicare IM reviewed with patient yesterday. /DSC to continue to send updated notes to [facility] via [facility] as notes available. Plan for patient is to return to [facility] to resume long-term care when medically ready. Care Transitions to follow and assist. BELLE Orosco

## 2025-06-10 NOTE — CARE PLAN
The patient's goals for the shift include      The clinical goals for the shift include improve strength and prevent worsening infection      Problem: Pain - Adult  Goal: Verbalizes/displays adequate comfort level or baseline comfort level  Outcome: Progressing     Problem: Safety - Adult  Goal: Free from fall injury  Outcome: Progressing     Problem: Discharge Planning  Goal: Discharge to home or other facility with appropriate resources  Outcome: Progressing     Problem: Chronic Conditions and Co-morbidities  Goal: Patient's chronic conditions and co-morbidity symptoms are monitored and maintained or improved  Outcome: Progressing     Problem: Nutrition  Goal: Nutrient intake appropriate for maintaining nutritional needs  Outcome: Progressing     Problem: Skin  Goal: Decreased wound size/increased tissue granulation at next dressing change  Outcome: Progressing  Goal: Participates in plan/prevention/treatment measures  Outcome: Progressing  Goal: Prevent/manage excess moisture  Outcome: Progressing  Goal: Prevent/minimize sheer/friction injuries  Outcome: Progressing  Goal: Promote/optimize nutrition  Outcome: Progressing  Goal: Promote skin healing  Outcome: Progressing     Problem: Pain  Goal: Takes deep breaths with improved pain control throughout the shift  Outcome: Progressing  Goal: Turns in bed with improved pain control throughout the shift  Outcome: Progressing  Goal: Walks with improved pain control throughout the shift  Outcome: Progressing  Goal: Performs ADL's with improved pain control throughout shift  Outcome: Progressing  Goal: Participates in PT with improved pain control throughout the shift  Outcome: Progressing  Goal: Free from opioid side effects throughout the shift  Outcome: Progressing  Goal: Free from acute confusion related to pain meds throughout the shift  Outcome: Progressing

## 2025-06-10 NOTE — CARE PLAN
The patient's goals for the shift include  feel better    The clinical goals for the shift include a&4x4 by eos    Over the shift, the patient did not make progress toward the following goals. Barriers to progression include . Recommendations to address these barriers include .

## 2025-06-11 LAB
ATRIAL RATE: 84 BPM
P AXIS: 34 DEGREES
P OFFSET: 163 MS
P ONSET: 101 MS
PR INTERVAL: 242 MS
Q ONSET: 222 MS
QRS COUNT: 14 BEATS
QRS DURATION: 94 MS
QT INTERVAL: 396 MS
QTC CALCULATION(BAZETT): 467 MS
QTC FREDERICIA: 442 MS
R AXIS: 40 DEGREES
T AXIS: 32 DEGREES
T OFFSET: 420 MS
VENTRICULAR RATE: 84 BPM

## 2025-06-11 PROCEDURE — 1100000001 HC PRIVATE ROOM DAILY

## 2025-06-11 PROCEDURE — 1200000002 HC GENERAL ROOM WITH TELEMETRY DAILY

## 2025-06-11 PROCEDURE — 2500000005 HC RX 250 GENERAL PHARMACY W/O HCPCS: Performed by: PHYSICIAN ASSISTANT

## 2025-06-11 PROCEDURE — 99232 SBSQ HOSP IP/OBS MODERATE 35: CPT | Performed by: HOSPITALIST

## 2025-06-11 PROCEDURE — 2500000004 HC RX 250 GENERAL PHARMACY W/ HCPCS (ALT 636 FOR OP/ED): Performed by: STUDENT IN AN ORGANIZED HEALTH CARE EDUCATION/TRAINING PROGRAM

## 2025-06-11 PROCEDURE — 94762 N-INVAS EAR/PLS OXIMTRY CONT: CPT

## 2025-06-11 PROCEDURE — 2500000001 HC RX 250 WO HCPCS SELF ADMINISTERED DRUGS (ALT 637 FOR MEDICARE OP): Performed by: STUDENT IN AN ORGANIZED HEALTH CARE EDUCATION/TRAINING PROGRAM

## 2025-06-11 PROCEDURE — 2500000004 HC RX 250 GENERAL PHARMACY W/ HCPCS (ALT 636 FOR OP/ED): Performed by: HOSPITALIST

## 2025-06-11 PROCEDURE — 2500000001 HC RX 250 WO HCPCS SELF ADMINISTERED DRUGS (ALT 637 FOR MEDICARE OP): Performed by: HOSPITALIST

## 2025-06-11 RX ADMIN — PIPERACILLIN SODIUM AND TAZOBACTAM SODIUM 4.5 G: 4; .5 INJECTION, SOLUTION INTRAVENOUS at 16:43

## 2025-06-11 RX ADMIN — METOPROLOL SUCCINATE 25 MG: 25 TABLET, EXTENDED RELEASE ORAL at 09:25

## 2025-06-11 RX ADMIN — GABAPENTIN 600 MG: 300 CAPSULE ORAL at 15:15

## 2025-06-11 RX ADMIN — GABAPENTIN 600 MG: 300 CAPSULE ORAL at 09:25

## 2025-06-11 RX ADMIN — METHIMAZOLE 5 MG: 5 TABLET ORAL at 15:15

## 2025-06-11 RX ADMIN — BACLOFEN 10 MG: 10 TABLET ORAL at 20:15

## 2025-06-11 RX ADMIN — ATORVASTATIN CALCIUM 80 MG: 80 TABLET, FILM COATED ORAL at 20:15

## 2025-06-11 RX ADMIN — HYDROCODONE BITARTRATE AND ACETAMINOPHEN 1 TABLET: 5; 325 TABLET ORAL at 20:15

## 2025-06-11 RX ADMIN — ENOXAPARIN SODIUM 40 MG: 40 INJECTION SUBCUTANEOUS at 09:25

## 2025-06-11 RX ADMIN — Medication 21 PERCENT: at 06:41

## 2025-06-11 RX ADMIN — METHIMAZOLE 5 MG: 5 TABLET ORAL at 09:25

## 2025-06-11 RX ADMIN — METHIMAZOLE 5 MG: 5 TABLET ORAL at 20:15

## 2025-06-11 RX ADMIN — CARBAMAZEPINE 200 MG: 200 TABLET ORAL at 09:25

## 2025-06-11 RX ADMIN — PIPERACILLIN SODIUM AND TAZOBACTAM SODIUM 4.5 G: 4; .5 INJECTION, SOLUTION INTRAVENOUS at 11:17

## 2025-06-11 RX ADMIN — PIPERACILLIN SODIUM AND TAZOBACTAM SODIUM 4.5 G: 4; .5 INJECTION, SOLUTION INTRAVENOUS at 05:29

## 2025-06-11 RX ADMIN — GABAPENTIN 600 MG: 300 CAPSULE ORAL at 20:15

## 2025-06-11 RX ADMIN — BACLOFEN 10 MG: 10 TABLET ORAL at 15:16

## 2025-06-11 RX ADMIN — CARBAMAZEPINE 200 MG: 200 TABLET ORAL at 20:15

## 2025-06-11 RX ADMIN — PIPERACILLIN SODIUM AND TAZOBACTAM SODIUM 4.5 G: 4; .5 INJECTION, SOLUTION INTRAVENOUS at 22:52

## 2025-06-11 RX ADMIN — BACLOFEN 10 MG: 10 TABLET ORAL at 09:25

## 2025-06-11 RX ADMIN — SENNOSIDES AND DOCUSATE SODIUM 3 TABLET: 50; 8.6 TABLET ORAL at 09:25

## 2025-06-11 RX ADMIN — HYDROCODONE BITARTRATE AND ACETAMINOPHEN 1 TABLET: 5; 325 TABLET ORAL at 09:25

## 2025-06-11 RX ADMIN — POLYETHYLENE GLYCOL 3350 17 G: 17 POWDER, FOR SOLUTION ORAL at 11:14

## 2025-06-11 ASSESSMENT — PAIN - FUNCTIONAL ASSESSMENT
PAIN_FUNCTIONAL_ASSESSMENT: 0-10

## 2025-06-11 ASSESSMENT — COGNITIVE AND FUNCTIONAL STATUS - GENERAL
HELP NEEDED FOR BATHING: TOTAL
DRESSING REGULAR UPPER BODY CLOTHING: TOTAL
MOVING FROM LYING ON BACK TO SITTING ON SIDE OF FLAT BED WITH BEDRAILS: A LOT
DAILY ACTIVITIY SCORE: 6
PERSONAL GROOMING: TOTAL
DRESSING REGULAR LOWER BODY CLOTHING: TOTAL
EATING MEALS: TOTAL
WALKING IN HOSPITAL ROOM: TOTAL
MOVING TO AND FROM BED TO CHAIR: TOTAL
TOILETING: TOTAL
TURNING FROM BACK TO SIDE WHILE IN FLAT BAD: TOTAL
MOBILITY SCORE: 7
CLIMB 3 TO 5 STEPS WITH RAILING: TOTAL
STANDING UP FROM CHAIR USING ARMS: TOTAL

## 2025-06-11 ASSESSMENT — PAIN SCALES - GENERAL
PAINLEVEL_OUTOF10: 0 - NO PAIN

## 2025-06-11 NOTE — CARE PLAN
The patient's goals for the shift include      The clinical goals for the shift include remain safe. WBC WDL    Progressing

## 2025-06-11 NOTE — PROGRESS NOTES
"Nutrition Initial Assessment:   Nutrition Assessment    Reason for Assessment: Dietitian discretion    Patient is a 65 y.o. male presenting with generalized weakness and concern for sepsis. PMH significant for multiple sclerosis with neurogenic bladder and a suprapubic catheter, COPD, GERD, HLD, HTN, TIA.      Nutrition History:  Energy Intake: Poor < 50 %  Food and Nutrient History: pt reports eating 3 meals/day at baseline. states he is not hungry, hasnt been for the last week or so. states he is drinking his supplement, denies GI symptoms.     6/9: Multiple attempts to reach pt unsuccessful. Per admission nursing screening, pt reported eating poorly due to decreased appetite. Per flowsheets, pt with 25% intake at 2 documented meals since admission.     Anthropometrics:  Height: 185.4 cm (6' 1\")   Weight: 80.4 kg (177 lb 4 oz)   BMI (Calculated): 23.39  IBW/kg (Dietitian Calculated): 83.6 kg  Percent of IBW: 93 %                      Weight History:   Wt Readings from Last 15 Encounters:   06/11/25 80.4 kg (177 lb 4 oz)   04/28/25 78 kg (172 lb)   04/05/25 78.2 kg (172 lb 4.8 oz)   03/02/25 78.3 kg (172 lb 9.9 oz)   01/19/25 80.3 kg (177 lb)   10/27/24 79.4 kg (175 lb)   10/22/24 77.1 kg (170 lb)   09/10/24 80 kg (176 lb 5.9 oz)   09/09/24 78 kg (171 lb 15.3 oz)   08/20/24 77.1 kg (170 lb)   08/12/24 78.3 kg (172 lb 9.6 oz)   07/31/24 78 kg (172 lb)   05/08/24 77.1 kg (170 lb)   04/27/24 77.1 kg (170 lb)   04/19/24 79 kg (174 lb 1.6 oz)     Weight Change %:  Weight History / % Weight Change: Per chart review, pt with slight weight fluctuations, overall stable x 1 year.  Significant Weight Loss: No    Nutrition Focused Physical Exam Findings:    Subcutaneous Fat Loss:   Defer Subcutaneous Fat Loss Assessment: Defer all  Muscle Wasting:  Defer Muscle Wasting Assessment: Defer all  Edema:  Edema: none  Physical Findings:  Positive Skin Findings: Unstageable pressure injury (Coccyx)    Nutrition Significant Labs:  BMP " Trend:   Results from last 7 days   Lab Units 06/10/25  0528 06/09/25  0416 06/08/25  1753 06/08/25  1455   GLUCOSE mg/dL 93 82 95 77   CALCIUM mg/dL 7.8* 7.8* 8.9 9.2   SODIUM mmol/L 136 139 133* 132*   POTASSIUM mmol/L 3.5 3.5 4.2 4.5   CO2 mmol/L 22 22 23 21   CHLORIDE mmol/L 108* 111* 102 99   BUN mg/dL 9 11 14 13   CREATININE mg/dL 0.49* 0.57 0.66 0.51    , A1C:  Lab Results   Component Value Date    HGBA1C 6.2 (H) 12/28/2021   , Vit D:   Lab Results   Component Value Date    VITD25 57 10/06/2022        Nutrition Specific Medications:  Scheduled medications  Scheduled Medications[1]  Continuous medications  Continuous Medications[2]  PRN medications  PRN Medications[3]      I/O:   Last BM Date: 06/10/25; Stool Appearance: Soft (06/11/25 1000)    Dietary Orders (From admission, onward)       Start     Ordered    06/09/25 1732  Oral nutritional supplements  Until discontinued        Question Answer Comment   Deliver with All meals    Select supplement: Ensure Plus High Protein        06/09/25 1731    06/09/25 0449  Adult diet Regular  Diet effective now        Question:  Diet type  Answer:  Regular    06/09/25 0448    06/08/25 2304  May Participate in Room Service With Assistance  ( ROOM SERVICE MAY PARTICIPATE WITH ASSISTANCE)  Once        Question:  .  Answer:  Yes    06/08/25 2303                     Estimated Needs:   Total Energy Estimated Needs in 24 hours (kCal): 2325 kCal  Method for Estimating Needs: 30 kcal/kg ABW  Total Protein Estimated Needs in 24 Hours (g): 116 g  Method for Estimating 24 Hour Protein Needs: 1.5 g/kg ABW  Total Fluid Estimated Needs in 24 Hours (mL): 2325 mL  Method for Estimating 24 Hour Fluid Needs: 1 ml/kcal or per provider        Nutrition Diagnosis   Malnutrition Diagnosis  Patient has Malnutrition Diagnosis: No    Nutrition Diagnosis  Patient has Nutrition Diagnosis: Yes  Diagnosis Status (1): Active  Nutrition Diagnosis 1: Inadequate oral intake  Related to (1): decreased  ability to consume sufficient energy  As Evidenced by (1): pt report of decreased intakes, po intakes <50% since admission       Nutrition Interventions/Recommendations        Nutrition Recommendations:  Individualized Nutrition Prescription Provided for : Continue diet as ordered. Recommend addition of Ensure Plus High Protein TID.    Nutrition Interventions/Goals:   Meals and Snacks: General healthful diet  Goal: Pt to tolerate regular diet as ordered  Medical Food Supplement: Commercial beverage medical food supplement therapy  Goal: Ensure Plus High Protein TID to provide 350 kcal, 20 g protein each  Coordination of Care with Providers: Nursing, SLP, Provider  Goal: to attend daily care rounds      Education Documentation  No documentation found.            Nutrition Monitoring and Evaluation   Food/Nutrient Related History Monitoring  Monitoring and Evaluation Plan: Intake / amount of food  Intake / Amount of food: Consumes at least 50% or more of meals/snacks/supplements    Anthropometric Measurements  Monitoring and Evaluation Plan: Body weight  Body Weight: Body weight - Maintain stable weight              Goal Status: Some progress toward goal(s)    Time Spent (min): 45 minutes           06/11/25 at 12:22 PM - ANGELIQUE BRIGHT RDN, LD         [1] atorvastatin, 80 mg, oral, Nightly  baclofen, 10 mg, oral, TID  carBAMazepine, 200 mg, oral, BID  enoxaparin, 40 mg, subcutaneous, Daily  gabapentin, 600 mg, oral, TID  HYDROcodone-acetaminophen, 1 tablet, oral, BID  methIMAzole, 5 mg, oral, TID  metoprolol succinate XL, 25 mg, oral, Daily  piperacillin-tazobactam, 4.5 g, intravenous, q6h  polyethylene glycol, 17 g, oral, Daily  sennosides-docusate sodium, 3 tablet, oral, Daily     [2]    [3] PRN medications: acetaminophen, alum-mag hydroxide-simeth, benzocaine-menthol, benzonatate, bisacodyl, calcium carbonate, guaiFENesin, hydrALAZINE, hydrOXYzine pamoate, loperamide, melatonin, metoprolol, ondansetron, oxygen,  polyethylene glycol, prochlorperazine

## 2025-06-11 NOTE — PROGRESS NOTES
Hospitalist Progress Note    Patient's name: Raheem Hilton  YOB: 1960  Age: 65 y.o.  Medical record number: [id number]    Date of service: 06/11/25  Time: 9:10 AM    Chief Complaint: Follow-up for sepsis, complicated UTI, bacteremia, history of multiple sclerosis.    Subjective:  Patient seen and examined.  Patient has been doing good, feeling better.  No specific complaints.  His vitals remained stable, remained afebrile, remained on room air.    Medications:  Current Medications[1]    Vital signs in last 24 hours:  Temp:  [35.9 °C (96.6 °F)-36.9 °C (98.4 °F)] 36.9 °C (98.4 °F)  Heart Rate:  [48-87] 72  Resp:  [12-16] 13  BP: (136-151)/(66-83) 139/82    Physical Exam:  General: Awake, alert, oriented x3, no significant distress, cooperative.  HEENT: EOM intact, PERRLA.  Neck: Supple, no JVD, no masses, no lymphadenopathy.  Chest: Diminished breath sounds bilateral, otherwise clear,  no wheezes, no crackles, no rhonchi.  Heart: Regular rate and rhythm, S1-S2 normal, no murmur, no gallops.  Abdomen: Soft, nontender, no organomegaly, no ascites, no guarding or rigidity, suprapubic catheter in place.  Neurological: Alert and oriented x3, cranial nerves are intact, weakness on the left lower extremity which is chronic, moving all other limbs.    Skin: No lesions, no skin rash.  Warm and dry.  Musculoskeletal: Normal, atraumatic, no obvious deformities.  Legs: No leg edema, no clubbing or cyanosis.  Psych: Appropriate mood and behavior.    LABS:  Lab Results   Component Value Date    WBC 11.5 (H) 06/10/2025    HGB 12.1 (L) 06/10/2025    HCT 38.8 (L) 06/10/2025    MCV 99 06/10/2025     06/10/2025     Lab Results   Component Value Date    GLUCOSE 93 06/10/2025    CALCIUM 7.8 (L) 06/10/2025     06/10/2025    K 3.5 06/10/2025    CO2 22 06/10/2025     (H) 06/10/2025    BUN 9 06/10/2025    CREATININE 0.49 (L) 06/10/2025     Assessment and Plan:    Sepsis  Complicated UTI in setting of  suprapubic catheter  Neurogenic bladder  -admit to ICU  -maintain MAP 60-70 and urine output >=0.5 mL/kg/hour -continue IVF  -vanc/zosyn  -blood cultures and urine culture pending  -will need catheter changed  -lactate WNL     HTN  HLD  MS  -continue home regimen     Fluids: /h  Nutrition:   Bowel prophylaxis:   DVT prophylaxis: Subcu Lovenox.    Daily progress/update:  6/9/2029:  Afebrile, blood pressure and heart rate are stable, on room air, he is on IV vancomycin and Zosyn, I doubt septic shock, sepsis is evident, today CBC and BMP reviewed as above, WBC is trending down, lactic acid was normal, blood and urine cultures are pending, plan to discontinue IV vancomycin, continue Zosyn, discontinue IV fluids, repeat CBC and BMP tomorrow morning, start Lovenox for DVT prophylaxis, continue other treatments.  6/10/2025:  Hemodynamically stable, afebrile, on room air, remains on IV Zosyn, today CBC and BMP reviewed as above, WBC is trending down, improving, urine culture showed contamination with mixed bacterial ary, 1 bottle of blood culture revealed gram-positive cocci in clusters, plan to continue IV antibiotics, follow blood cultures, anticipate discharge back to SNF in the next 24 to 48 hours.  6/11/2025:  Hemodynamically stable, afebrile, remains on IV Zosyn, 1 bottle of blood culture showed micrococcus Luteus which likely a contamination, the other blood culture showed no growth at 1 day, urine culture showed mixed bacterial ary which also could be contamination, final blood cultures pending, plan to continue IV Zosyn, anticipate discharge back to SNF in the next 24 hours.    MDM: Moderate complexity, time spent is 44 minutes.    ARABELLA BENZ MD.    06/11/25  12:56 PM         [1]   Current Facility-Administered Medications:     acetaminophen (Tylenol) tablet 650 mg, 650 mg, oral, q6h PRN, Angelita Low MD, 650 mg at 06/10/25 1441    alum-mag hydroxide-simeth (Mylanta) 200-200-20 mg/5 mL oral  suspension 20 mL, 20 mL, oral, 4x daily PRN, Angelita Low MD    atorvastatin (Lipitor) tablet 80 mg, 80 mg, oral, Nightly, Angelita Low MD, 80 mg at 06/10/25 2030    baclofen (Lioresal) tablet 10 mg, 10 mg, oral, TID, Angelita Low MD, 10 mg at 06/10/25 2030    benzocaine-menthol (Cepastat Sore Throat) lozenge 1 lozenge, 1 lozenge, Mouth/Throat, q2h PRN, Angelita Low MD    benzonatate (Tessalon) capsule 100 mg, 100 mg, oral, TID PRN, Angelita Low MD    bisacodyl (Dulcolax) suppository 10 mg, 10 mg, rectal, Daily PRN, Angelita Low MD    calcium carbonate (Tums) 500 mg (200 mg elemental) chewable tablet 2 tablet, 2 tablet, oral, 4x daily PRN, Angelita Low MD    carBAMazepine (TEGretol) tablet 200 mg, 200 mg, oral, BID, Angelita Low MD, 200 mg at 06/10/25 2030    enoxaparin (Lovenox) syringe 40 mg, 40 mg, subcutaneous, Daily, Nyla Antonio MD, 40 mg at 06/10/25 1103    gabapentin (Neurontin) capsule 600 mg, 600 mg, oral, TID, Angelita Low MD, 600 mg at 06/10/25 2030    guaiFENesin (Mucinex) 12 hr tablet 600 mg, 600 mg, oral, BID PRN, Angelita Low MD    hydrALAZINE (Apresoline) injection 10 mg, 10 mg, intravenous, q4h PRN, Angelita Low MD    HYDROcodone-acetaminophen (Norco) 5-325 mg per tablet 1 tablet, 1 tablet, oral, BID, Angelita Low MD, 1 tablet at 06/10/25 2030    hydrOXYzine pamoate (Vistaril) capsule 25 mg, 25 mg, oral, q4h PRN, Angelita Low MD    loperamide (Imodium) capsule 2 mg, 2 mg, oral, PRN, Angelita Low MD    melatonin tablet 6 mg, 6 mg, oral, Nightly PRN, Angelita Low MD    methIMAzole (Tapazole) tablet 5 mg, 5 mg, oral, TID, Angelita Low MD, 5 mg at 06/10/25 2030    metoprolol succinate XL (Toprol-XL) 24 hr tablet 25 mg, 25 mg, oral, Daily, Angelita Low MD, 25 mg at 06/10/25 1029    metoprolol tartrate (Lopressor) injection 5 mg, 5 mg, intravenous, q6h PRN, Angelita Low MD    ondansetron  (Zofran) injection 4 mg, 4 mg, intravenous, q4h PRN, Angelita Low MD    oxygen (O2) therapy, , inhalation, Continuous PRN - O2/gases, Tamra Scherer PA-C, 21 percent at 06/11/25 0641    piperacillin-tazobactam (Zosyn) 4.5 g in dextrose (iso)  mL, 4.5 g, intravenous, q6h, Angelita Low MD, Stopped at 06/11/25 0613    polyethylene glycol (Glycolax, Miralax) packet 17 g, 17 g, oral, Daily PRN, Angelita Low MD    polyethylene glycol (Glycolax, Miralax) packet 17 g, 17 g, oral, Daily, Angelita Low MD, 17 g at 06/10/25 1029    prochlorperazine (Compazine) injection 10 mg, 10 mg, intravenous, q6h PRN, Angelita Low MD    sennosides-docusate sodium (Melisa-Colace) 8.6-50 mg per tablet 3 tablet, 3 tablet, oral, Daily, Nyla Antonio MD, 3 tablet at 06/10/25 1026

## 2025-06-11 NOTE — PROGRESS NOTES
Per medical team, patient is not yet medically appropriate for discharge today; will likely require another 24 hours in the hospital.  met with patient at bedside to review plan and Medicare IM. Patient confirmed understanding and agreement with same. DAREN/Dorothy to continue to send updated notes to [facility] via [facility] as notes available. Plan for patient is to return to [facility] to resume long-term care when medically ready. Care Transitions to follow and assist. BELLE Orosco

## 2025-06-12 VITALS
HEART RATE: 73 BPM | WEIGHT: 177.25 LBS | RESPIRATION RATE: 16 BRPM | DIASTOLIC BLOOD PRESSURE: 75 MMHG | SYSTOLIC BLOOD PRESSURE: 121 MMHG | HEIGHT: 73 IN | OXYGEN SATURATION: 93 % | TEMPERATURE: 97 F | BODY MASS INDEX: 23.49 KG/M2

## 2025-06-12 LAB — SARS-COV-2 RNA RESP QL NAA+PROBE: NOT DETECTED

## 2025-06-12 PROCEDURE — 2500000004 HC RX 250 GENERAL PHARMACY W/ HCPCS (ALT 636 FOR OP/ED): Performed by: HOSPITALIST

## 2025-06-12 PROCEDURE — 2500000004 HC RX 250 GENERAL PHARMACY W/ HCPCS (ALT 636 FOR OP/ED): Performed by: STUDENT IN AN ORGANIZED HEALTH CARE EDUCATION/TRAINING PROGRAM

## 2025-06-12 PROCEDURE — 2500000001 HC RX 250 WO HCPCS SELF ADMINISTERED DRUGS (ALT 637 FOR MEDICARE OP): Performed by: STUDENT IN AN ORGANIZED HEALTH CARE EDUCATION/TRAINING PROGRAM

## 2025-06-12 PROCEDURE — 99238 HOSP IP/OBS DSCHRG MGMT 30/<: CPT | Performed by: HOSPITALIST

## 2025-06-12 PROCEDURE — 87635 SARS-COV-2 COVID-19 AMP PRB: CPT | Performed by: HOSPITALIST

## 2025-06-12 PROCEDURE — 2500000001 HC RX 250 WO HCPCS SELF ADMINISTERED DRUGS (ALT 637 FOR MEDICARE OP): Performed by: HOSPITALIST

## 2025-06-12 RX ADMIN — PIPERACILLIN SODIUM AND TAZOBACTAM SODIUM 4.5 G: 4; .5 INJECTION, SOLUTION INTRAVENOUS at 10:44

## 2025-06-12 RX ADMIN — ENOXAPARIN SODIUM 40 MG: 40 INJECTION SUBCUTANEOUS at 08:55

## 2025-06-12 RX ADMIN — METOPROLOL SUCCINATE 25 MG: 25 TABLET, EXTENDED RELEASE ORAL at 08:54

## 2025-06-12 RX ADMIN — PIPERACILLIN SODIUM AND TAZOBACTAM SODIUM 4.5 G: 4; .5 INJECTION, SOLUTION INTRAVENOUS at 05:19

## 2025-06-12 RX ADMIN — SENNOSIDES AND DOCUSATE SODIUM 3 TABLET: 50; 8.6 TABLET ORAL at 08:53

## 2025-06-12 RX ADMIN — GABAPENTIN 600 MG: 300 CAPSULE ORAL at 08:54

## 2025-06-12 RX ADMIN — BACLOFEN 10 MG: 10 TABLET ORAL at 08:54

## 2025-06-12 RX ADMIN — HYDROCODONE BITARTRATE AND ACETAMINOPHEN 1 TABLET: 5; 325 TABLET ORAL at 08:54

## 2025-06-12 RX ADMIN — CARBAMAZEPINE 200 MG: 200 TABLET ORAL at 08:54

## 2025-06-12 RX ADMIN — METHIMAZOLE 5 MG: 5 TABLET ORAL at 08:54

## 2025-06-12 ASSESSMENT — PAIN - FUNCTIONAL ASSESSMENT: PAIN_FUNCTIONAL_ASSESSMENT: 0-10

## 2025-06-12 ASSESSMENT — COGNITIVE AND FUNCTIONAL STATUS - GENERAL
HELP NEEDED FOR BATHING: TOTAL
WALKING IN HOSPITAL ROOM: TOTAL
EATING MEALS: TOTAL
MOBILITY SCORE: 7
TOILETING: TOTAL
DRESSING REGULAR LOWER BODY CLOTHING: TOTAL
DRESSING REGULAR UPPER BODY CLOTHING: TOTAL
CLIMB 3 TO 5 STEPS WITH RAILING: TOTAL
MOVING FROM LYING ON BACK TO SITTING ON SIDE OF FLAT BED WITH BEDRAILS: A LOT
DAILY ACTIVITIY SCORE: 6
TURNING FROM BACK TO SIDE WHILE IN FLAT BAD: TOTAL
PERSONAL GROOMING: TOTAL
STANDING UP FROM CHAIR USING ARMS: TOTAL
MOVING TO AND FROM BED TO CHAIR: TOTAL

## 2025-06-12 ASSESSMENT — PAIN SCALES - GENERAL: PAINLEVEL_OUTOF10: 0 - NO PAIN

## 2025-06-12 NOTE — PROGRESS NOTES
Per medical team, patient is medically appropriate for discharge today.  asked DAREN/Dorothy to send final orders to [facility] via [facility] when complete. Nursing confirmed transport for 1330. Patient and facility updated. Plan for patient is to return to [facility] to resume long-term care at 1330 today. No further Care Transitions needs foreseen. Care Transitions available upon request. BELLE Wilburn

## 2025-06-12 NOTE — CARE PLAN
The patient's goals for the shift include  rest comfortably in bed     The clinical goals for the shift include remain safe. WBC WDL    Pt. Had no issues overnight.  Ate snacks, watched tv.  Denies any pain.  Respirations even and unlabored.  Turned and repositioned per order.  Small amount of blood in heaton bag.  Suprapubic cath changed yesterday.  Possibly back to [facility] today.         Problem: Pain - Adult  Goal: Verbalizes/displays adequate comfort level or baseline comfort level  Outcome: Progressing     Problem: Safety - Adult  Goal: Free from fall injury  Outcome: Progressing     Problem: Discharge Planning  Goal: Discharge to home or other facility with appropriate resources  Outcome: Progressing     Problem: Chronic Conditions and Co-morbidities  Goal: Patient's chronic conditions and co-morbidity symptoms are monitored and maintained or improved  Outcome: Progressing     Problem: Nutrition  Goal: Nutrient intake appropriate for maintaining nutritional needs  Outcome: Progressing     Problem: Skin  Goal: Decreased wound size/increased tissue granulation at next dressing change  Outcome: Progressing  Goal: Participates in plan/prevention/treatment measures  Outcome: Progressing  Goal: Prevent/manage excess moisture  Outcome: Progressing  Goal: Prevent/minimize sheer/friction injuries  Outcome: Progressing  Goal: Promote/optimize nutrition  Outcome: Progressing  Goal: Promote skin healing  Outcome: Progressing

## 2025-06-12 NOTE — DISCHARGE SUMMARY
Discharge summary.    Raheem Hilton  65 y.o.  1960  [id number]    Date of admission: 6/8/2025   Date of discharge: 6/12/2025.    Discharge Diagnosis:  #1 sepsis without evidence of septic shock or severe sepsis.  #2 complicated UTI/acute cystitis.  #3 bacteremia, deemed to be contamination    Problem list:  Patient Active Problem List    Diagnosis Date Noted    Generalized weakness 06/08/2025    Nursing home resident 12/30/2024    Primary hypertension 10/22/2024    Chronic bronchitis (Multi) 10/22/2024    Gastroesophageal reflux disease 10/22/2024    UTI (urinary tract infection) 09/09/2024    Urinary tract infection with hematuria, site unspecified 04/19/2024    Urinary catheter complication 10/02/2023    Sacral decubitus ulcer 10/02/2023    Neurogenic bladder 11/07/2012    Multiple sclerosis (Multi) 10/03/2012    Elevated PSA 08/02/2024     Discharge medications:     Your medication list        CONTINUE taking these medications        Instructions Last Dose Given Next Dose Due   acetaminophen 325 mg tablet  Commonly known as: Tylenol      Take 2 tablets (650 mg) by mouth every 4 hours if needed for moderate pain (4 - 6).       albuterol 2.5 mg /3 mL (0.083 %) nebulizer solution      Take 3 mL (2.5 mg) by nebulization every 6 hours if needed for wheezing.       atorvastatin 80 mg tablet  Commonly known as: Lipitor           baclofen 10 mg tablet  Commonly known as: Lioresal           Biofreeze (menthol) 4 % gel gel  Generic drug: menthol           BOOST PLUS ORAL           carBAMazepine 200 mg tablet  Commonly known as: TEGretol           DULCOLAX (BISACODYL) RECT           gabapentin 600 mg tablet  Commonly known as: Neurontin           HYDROcodone-acetaminophen 5-325 mg tablet  Commonly known as: Norco            lactobacillus acidophilus capsule           loperamide 2 mg capsule  Commonly known as: Imodium           methIMAzole 5 mg tablet  Commonly known as: Tapazole           metoprolol succinate XL 25 mg 24 hr tablet  Commonly known as: Toprol-XL           ondansetron 4 mg tablet  Commonly known as: Zofran           polyethylene glycol 17 gram packet  Commonly known as: Glycolax, Miralax           sennosides-docusate sodium 8.6-50 mg tablet  Commonly known as: Melisa-Colace           sodium phosphates 19-7 gram/118 mL enema enema  Commonly known as: Fleets           STRESS B PLUS ZINC ORAL                  Hospital Course  This is a 65 years old male patient with past medical history of multiple sclerosis and chronic suprapubic catheter due to neurogenic bladder presented to the emergency room from [facility] nursing facility because of weakness and hypotension.  Upon arrival to ED, patient was hypotensive, blood pressure dropped down to 71/53.  He received IV fluids bolus and blood pressure improved.  He was found to have sepsis due to complicated urine tract infection/acute cystitis in the setting of chronic suprapubic catheter.  There was no evidence of septic shock as patient's blood pressure improved with IV fluid bolus.  On admission, patient does have leukocytosis and his lactic acid was normal.  Chest x-ray showed no acute infiltrate or consolidation.  He was treated with IV Zosyn.  The suprapubic catheter was replaced.  With IV antibiotics, WBC trended down and upon discharge, WBC was 11.5.  Kidney function remained stable.  Urine culture revealed contamination with mixed bacterial ary.  1 bottle of blood culture revealed Micrococcus luteus which may indicate contamination.  The other positive blood culture showed no growth at 2 days.  Patient remained afebrile throughout the hospital stay.  His blood pressure remained stable for more than 48 hours.  He had no hypoxia and he was on room air.  he received total  "of 4 days of IV Zosyn.  Patient discharged back to skilled nursing facility in stable medical condition, no antibiotic prescribed upon discharge because the patient received 4 days of IV Zosyn and urine culture showed no growth, continue with his previous medications without any changes, recommended from with PCP in 1 week.    Vital signs:  Visit Vitals  /72 (BP Location: Left arm, Patient Position: Lying)   Pulse 73   Temp 36.1 °C (97 °F) (Temporal)   Resp 16   Ht 1.854 m (6' 1\")   Wt 80.4 kg (177 lb 4 oz)   SpO2 93%   BMI 23.39 kg/m²   Smoking Status Never   BSA 2.03 m²     Discharge physical exam:  Physical Exam:  General: Awake, alert, oriented x3, no significant distress, cooperative.  HEENT: EOM intact, PERRLA.  Neck: Supple, no JVD, no masses, no lymphadenopathy.  Chest: Diminished breath sounds bilateral, otherwise clear,  no wheezes, no crackles, no rhonchi.  Heart: Regular rate and rhythm, S1-S2 normal, no murmur, no gallops.  Abdomen: Soft, nontender, no organomegaly, no ascites, no guarding or rigidity, suprapubic catheter in place.  Neurological: Alert and oriented x3, cranial nerves are intact, weakness on the left lower extremity which is chronic, moving all other limbs.    Skin: No lesions, no skin rash.  Warm and dry.  Musculoskeletal: Normal, atraumatic, no obvious deformities.  Legs: No leg edema, no clubbing or cyanosis.  Psych: Appropriate mood and behavior.    Labs:  Lab Results   Component Value Date    WBC 11.5 (H) 06/10/2025    HGB 12.1 (L) 06/10/2025    HCT 38.8 (L) 06/10/2025    MCV 99 06/10/2025     06/10/2025     Lab Results   Component Value Date    GLUCOSE 93 06/10/2025    CALCIUM 7.8 (L) 06/10/2025     06/10/2025    K 3.5 06/10/2025    CO2 22 06/10/2025     (H) 06/10/2025    BUN 9 06/10/2025    CREATININE 0.49 (L) 06/10/2025     Lab Results   Component Value Date    ALT 12 06/09/2025    AST 13 06/09/2025    ALKPHOS 83 06/09/2025    BILITOT 0.8 06/09/2025 "     Imaging studies:  Procedure Component Value Units Date/Time   XR chest 1 view [[id number]] Collected: 06/08/25 1815   Order Status: Completed Updated: 06/08/25 1815   Narrative:     Interpreted By:  Cuate Garcia,  STUDY:  XR CHEST 1 VIEW;  6/8/2025 5:55 pm      INDICATION:  Signs/Symptoms:fever.          COMPARISON:  04/05/2025      ACCESSION NUMBER(S):  [id number]      ORDERING CLINICIAN:  EVELYN LINDSAY      FINDINGS:                  CARDIOMEDIASTINAL SILHOUETTE:  Cardiomediastinal silhouette is normal in size and configuration.      LUNGS:  Right basilar atelectatic changes present. There is mild left base  atelectasis as well. No consolidation or effusion seen. The lungs are  mildly hyperinflated      ABDOMEN:  No remarkable upper abdominal findings.      BONES:  No acute osseous changes.       Impression:     1. Bibasilar atelectasis worse on the right. No acute abnormality              MACRO:  None      Signed by: Cuate Garcia 6/8/2025 6:13 PM  Dictation workstation:   [id number]     Disposition: Skilled nursing facility.    Time spent on discharge and discharge summary is 29 minutes.    Nyla Antonio MD  06/12/25  1:32 PM       Statement Selected

## 2025-06-13 LAB
BACTERIA BLD AEROBE CULT: ABNORMAL
BACTERIA BLD CULT: ABNORMAL
BACTERIA BLD CULT: NORMAL
GRAM STN SPEC: ABNORMAL

## 2025-06-21 ENCOUNTER — LAB REQUISITION (OUTPATIENT)
Dept: LAB | Facility: HOSPITAL | Age: 65
End: 2025-06-21
Payer: MEDICARE

## 2025-06-21 DIAGNOSIS — N39.0 URINARY TRACT INFECTION, SITE NOT SPECIFIED: ICD-10-CM

## 2025-06-21 LAB
APPEARANCE UR: ABNORMAL
BILIRUB UR STRIP.AUTO-MCNC: NEGATIVE MG/DL
COLOR UR: YELLOW
GLUCOSE UR STRIP.AUTO-MCNC: NORMAL MG/DL
KETONES UR STRIP.AUTO-MCNC: NEGATIVE MG/DL
LEUKOCYTE ESTERASE UR QL STRIP.AUTO: ABNORMAL
MUCOUS THREADS #/AREA URNS AUTO: ABNORMAL /LPF
NITRITE UR QL STRIP.AUTO: ABNORMAL
PH UR STRIP.AUTO: 6.5 [PH]
PROT UR STRIP.AUTO-MCNC: ABNORMAL MG/DL
RBC # UR STRIP.AUTO: ABNORMAL MG/DL
RBC #/AREA URNS AUTO: >20 /HPF
SP GR UR STRIP.AUTO: 1.01
UROBILINOGEN UR STRIP.AUTO-MCNC: NORMAL MG/DL
WBC #/AREA URNS AUTO: >50 /HPF
WBC CLUMPS #/AREA URNS AUTO: ABNORMAL /HPF

## 2025-06-21 PROCEDURE — 81001 URINALYSIS AUTO W/SCOPE: CPT | Mod: OUT | Performed by: INTERNAL MEDICINE

## 2025-06-21 PROCEDURE — 87086 URINE CULTURE/COLONY COUNT: CPT | Mod: OUT,SAMLAB | Performed by: INTERNAL MEDICINE

## 2025-06-22 LAB
BACTERIA UR CULT: ABNORMAL
BACTERIA UR CULT: ABNORMAL

## 2025-06-23 LAB — HOLD SPECIMEN: NORMAL

## 2025-06-24 LAB
BACTERIA UR CULT: ABNORMAL
BACTERIA UR CULT: ABNORMAL

## 2025-07-01 ENCOUNTER — TELEPHONE (OUTPATIENT)
Age: 65
End: 2025-07-01
Payer: MEDICARE

## 2025-07-01 NOTE — TELEPHONE ENCOUNTER
Pt was on IV antibiotics. IV came out during shower. Hasn't had any IV antibiotics over the weekend, yesterday or today. Wanted to confirm if you wanted to leave the IV out or if you wanted them to restart the IV and restart him all over on the antibiotic. No symptoms noted, vitals all within normal limits, no complaints from the pt. Please advise.

## 2025-07-22 DIAGNOSIS — G35 MULTIPLE SCLEROSIS (MULTI): Primary | ICD-10-CM

## 2025-07-22 RX ORDER — HYDROCODONE BITARTRATE AND ACETAMINOPHEN 5; 325 MG/1; MG/1
1 TABLET ORAL 2 TIMES DAILY
Qty: 30 TABLET | Refills: 0 | Status: SHIPPED | OUTPATIENT
Start: 2025-07-22

## (undated) DEVICE — TOWEL, OR, XRAY DECTECTABLE, 17 X 27, BLUE, STERILE

## (undated) DEVICE — DRESSING, GAUZE, SPONGE, 12 PLY, CURITY, 4 X 4 IN, RIGID TRAY, STERILE

## (undated) DEVICE — COVER, MAYO STAND, 23-1/2 X 56, LF

## (undated) DEVICE — STRAP, KNEE AND BODY, SINGLE USE

## (undated) DEVICE — MASK, FACE, ANESTHESIA, ADULT, LARGE, P6, RED

## (undated) DEVICE — MARKER, SKIN, REGULAR TIP, W/W/FLEXI RULER, LABEL

## (undated) DEVICE — STRAP, ARMBOARD, 3IN, BLUE/WHITE, SECURE HOOK

## (undated) DEVICE — TOWEL, OR, XRAY DECTECTABLE, 17 X 27, BLUE, 4/PK, STERILE

## (undated) DEVICE — NEEDLE, BIOPSY, MAX CORE, 18G

## (undated) DEVICE — GLOVE, PROTEXIS PI CLASSIC, SZ-8.0, PF, PF, LF

## (undated) DEVICE — DRESSING, NON-ADHERENT, TELFA, OUCHLESS, 3 X 8 IN, STERILE

## (undated) DEVICE — Device

## (undated) DEVICE — SOLUTION, PREP, PVP IODINE, FLIP TOP, 4OZ

## (undated) DEVICE — SCISSOR, BANDAGE, LISTER, ANGLED BLUNT, 4.5 IN, ECONOMY

## (undated) DEVICE — CIRCUIT, BREATHING, ADULT, B/V FILTER, HMEF, 3 L BAG, 108 IN

## (undated) DEVICE — SOLUTION, SALINE, 100ML